# Patient Record
Sex: FEMALE | Race: WHITE | NOT HISPANIC OR LATINO | Employment: FULL TIME | URBAN - NONMETROPOLITAN AREA
[De-identification: names, ages, dates, MRNs, and addresses within clinical notes are randomized per-mention and may not be internally consistent; named-entity substitution may affect disease eponyms.]

---

## 2017-07-20 ENCOUNTER — OFFICE VISIT (OUTPATIENT)
Dept: ORTHOPEDIC SURGERY | Facility: CLINIC | Age: 47
End: 2017-07-20

## 2017-07-20 DIAGNOSIS — M25.361 PATELLAR INSTABILITY OF RIGHT KNEE: ICD-10-CM

## 2017-07-20 DIAGNOSIS — M25.561 RIGHT KNEE PAIN, UNSPECIFIED CHRONICITY: Primary | ICD-10-CM

## 2017-07-20 PROCEDURE — 99203 OFFICE O/P NEW LOW 30 MIN: CPT | Performed by: ORTHOPAEDIC SURGERY

## 2017-07-20 PROCEDURE — 73560 X-RAY EXAM OF KNEE 1 OR 2: CPT | Performed by: ORTHOPAEDIC SURGERY

## 2017-07-20 RX ORDER — CLONAZEPAM 1 MG/1
TABLET ORAL
Refills: 0 | COMMUNITY
Start: 2017-07-03

## 2017-07-20 RX ORDER — BUPRENORPHINE HYDROCHLORIDE 8 MG/1
TABLET SUBLINGUAL
Refills: 0 | COMMUNITY
Start: 2017-07-06

## 2017-07-20 RX ORDER — PRAZOSIN HYDROCHLORIDE 2 MG/1
CAPSULE ORAL
Refills: 4 | Status: ON HOLD | COMMUNITY
Start: 2017-07-03 | End: 2023-01-20

## 2017-07-20 RX ORDER — MONTELUKAST SODIUM 10 MG/1
TABLET ORAL
Refills: 1 | COMMUNITY
Start: 2017-06-01

## 2017-07-20 RX ORDER — LAMOTRIGINE 200 MG/1
TABLET ORAL
Refills: 4 | COMMUNITY
Start: 2017-07-10

## 2017-07-20 RX ORDER — DESVENLAFAXINE 25 MG/1
TABLET, EXTENDED RELEASE ORAL
Refills: 1 | Status: ON HOLD | COMMUNITY
Start: 2017-06-19 | End: 2023-01-20

## 2017-07-20 RX ORDER — ROPINIROLE 2 MG/1
TABLET, FILM COATED ORAL
Refills: 5 | COMMUNITY
Start: 2017-07-06

## 2017-07-20 RX ORDER — BUPROPION HYDROCHLORIDE 150 MG/1
TABLET ORAL
Refills: 4 | COMMUNITY
Start: 2017-05-01

## 2017-07-20 RX ORDER — PHENTERMINE HYDROCHLORIDE 30 MG/1
CAPSULE ORAL
Refills: 0 | Status: ON HOLD | COMMUNITY
Start: 2017-06-27 | End: 2023-01-20

## 2017-07-20 RX ORDER — CETIRIZINE HYDROCHLORIDE 10 MG/1
TABLET ORAL
Refills: 3 | COMMUNITY
Start: 2017-07-06

## 2017-07-20 RX ORDER — MOMETASONE FUROATE 50 UG/1
SPRAY, METERED NASAL
COMMUNITY
Start: 2017-07-19 | End: 2021-12-22

## 2017-07-20 RX ORDER — TIZANIDINE 4 MG/1
TABLET ORAL
Refills: 5 | COMMUNITY
Start: 2017-07-06

## 2017-07-20 RX ORDER — IPRATROPIUM BROMIDE AND ALBUTEROL SULFATE 2.5; .5 MG/3ML; MG/3ML
SOLUTION RESPIRATORY (INHALATION)
Refills: 6 | COMMUNITY
Start: 2017-04-20

## 2017-07-20 RX ORDER — ALBUTEROL SULFATE 90 UG/1
POWDER, METERED RESPIRATORY (INHALATION)
Refills: 5 | COMMUNITY
Start: 2017-07-06

## 2017-07-20 RX ORDER — IBUPROFEN 600 MG/1
TABLET ORAL
Refills: 1 | COMMUNITY
Start: 2017-06-15

## 2017-07-20 RX ORDER — OMEPRAZOLE 40 MG/1
CAPSULE, DELAYED RELEASE ORAL
Refills: 2 | COMMUNITY
Start: 2017-07-07 | End: 2022-12-19 | Stop reason: SDUPTHER

## 2017-07-20 RX ORDER — AMITRIPTYLINE HYDROCHLORIDE 100 MG/1
TABLET, FILM COATED ORAL
Refills: 3 | COMMUNITY
Start: 2017-07-10

## 2017-07-26 ENCOUNTER — TELEPHONE (OUTPATIENT)
Dept: ORTHOPEDIC SURGERY | Facility: CLINIC | Age: 47
End: 2017-07-26

## 2017-08-06 PROBLEM — M25.361 PATELLAR INSTABILITY OF RIGHT KNEE: Status: ACTIVE | Noted: 2017-08-06

## 2017-08-06 PROBLEM — M25.561 RIGHT KNEE PAIN: Status: ACTIVE | Noted: 2017-08-06

## 2018-04-12 ENCOUNTER — OFFICE VISIT CONVERTED (OUTPATIENT)
Dept: PULMONOLOGY | Facility: CLINIC | Age: 48
End: 2018-04-12
Attending: INTERNAL MEDICINE

## 2018-06-07 ENCOUNTER — CONVERSION ENCOUNTER (OUTPATIENT)
Dept: PLASTIC SURGERY | Facility: CLINIC | Age: 48
End: 2018-06-07

## 2018-06-07 ENCOUNTER — OFFICE VISIT CONVERTED (OUTPATIENT)
Dept: PLASTIC SURGERY | Facility: CLINIC | Age: 48
End: 2018-06-07
Attending: PLASTIC SURGERY

## 2018-06-26 ENCOUNTER — OFFICE VISIT CONVERTED (OUTPATIENT)
Dept: PULMONOLOGY | Facility: CLINIC | Age: 48
End: 2018-06-26
Attending: INTERNAL MEDICINE

## 2018-12-04 ENCOUNTER — OFFICE VISIT CONVERTED (OUTPATIENT)
Dept: PULMONOLOGY | Facility: CLINIC | Age: 48
End: 2018-12-04
Attending: PHYSICIAN ASSISTANT

## 2019-01-15 ENCOUNTER — OFFICE VISIT CONVERTED (OUTPATIENT)
Dept: PULMONOLOGY | Facility: CLINIC | Age: 49
End: 2019-01-15
Attending: INTERNAL MEDICINE

## 2019-01-25 ENCOUNTER — HOSPITAL ENCOUNTER (OUTPATIENT)
Dept: CARDIOLOGY | Facility: HOSPITAL | Age: 49
Discharge: HOME | End: 2019-01-25
Attending: INTERNAL MEDICINE

## 2019-01-25 LAB
BASOPHILS # BLD AUTO: 0.04 10*3/UL (ref 0–0.2)
BASOPHILS NFR BLD AUTO: 0.6 % (ref 0–3)
EOSINOPHIL # BLD AUTO: 0.63 10*3/UL (ref 0–0.7)
EOSINOPHIL # BLD AUTO: 9 % (ref 0–7)
ERYTHROCYTE [DISTWIDTH] IN BLOOD BY AUTOMATED COUNT: 12.7 % (ref 11.5–14.5)
HBA1C MFR BLD: 14 G/DL (ref 12–16)
HCT VFR BLD AUTO: 44.3 % (ref 37–47)
LYMPHOCYTES # BLD AUTO: 1.79 10*3/UL (ref 1–5)
MCH RBC QN AUTO: 28.6 PG (ref 27–31)
MCHC RBC AUTO-ENTMCNC: 31.6 G/DL (ref 33–37)
MCV RBC AUTO: 90.6 FL (ref 81–99)
MONOCYTES # BLD AUTO: 0.45 10*3/UL (ref 0.2–1.2)
MONOCYTES NFR BLD AUTO: 6.4 % (ref 3–10)
NEUTROPHILS # BLD AUTO: 4.03 10*3/UL (ref 2–8)
NEUTROPHILS NFR BLD AUTO: 57.8 % (ref 30–85)
PLATELET # BLD AUTO: 242 10*3/UL (ref 130–400)
PMV BLD AUTO: 10.7 FL (ref 7.4–10.4)
RBC # BLD AUTO: 4.89 10*6/UL (ref 4.2–5.4)
VARIANT LYMPHS NFR BLD MANUAL: 25.6 % (ref 20–45)
WBC # BLD AUTO: 6.98 10*3/UL (ref 4.8–10.8)

## 2019-01-26 LAB
CONV HEPATITIS C TEST INFO: NORMAL
HCV RNA SERPL NAA+PROBE-ACNC: NORMAL IU/ML

## 2019-01-27 LAB
CONV HBV TEST INFORMATION: NORMAL
CONV HEPATITIS B VIRUS DNA (LOG UNITS PER VOLUME) (VIRAL LOAD): NORMAL LOG10 IU/ML
HEP B VIRAL DNA IU/ML: NORMAL IU/ML

## 2019-01-28 LAB — CONV ANTI NEUTROPHILIC CYTOPLASMIC AB W/REFLEX: NORMAL

## 2019-01-29 LAB — IGE SERPL-ACNC: 19 K[IU]/ML (ref 0–24)

## 2019-03-04 ENCOUNTER — OFFICE VISIT CONVERTED (OUTPATIENT)
Dept: PULMONOLOGY | Facility: CLINIC | Age: 49
End: 2019-03-04
Attending: PHYSICIAN ASSISTANT

## 2019-05-07 ENCOUNTER — OFFICE VISIT CONVERTED (OUTPATIENT)
Dept: PULMONOLOGY | Facility: CLINIC | Age: 49
End: 2019-05-07
Attending: PHYSICIAN ASSISTANT

## 2019-05-07 ENCOUNTER — HOSPITAL ENCOUNTER (OUTPATIENT)
Dept: LAB | Facility: HOSPITAL | Age: 49
Discharge: HOME | End: 2019-05-07
Attending: PHYSICIAN ASSISTANT

## 2019-05-07 LAB
BASOPHILS # BLD AUTO: 0.08 10*3/UL (ref 0–0.2)
BASOPHILS NFR BLD AUTO: 1.3 % (ref 0–3)
CONV ABS IMM GRAN: 0.03 10*3/UL (ref 0–0.2)
CONV IMMATURE GRAN: 0.5 % (ref 0–1.8)
DEPRECATED RDW RBC AUTO: 44.9 FL (ref 36.4–46.3)
EOSINOPHIL # BLD AUTO: 0.9 10*3/UL (ref 0–0.7)
EOSINOPHIL # BLD AUTO: 15.1 % (ref 0–7)
ERYTHROCYTE [DISTWIDTH] IN BLOOD BY AUTOMATED COUNT: 13.2 % (ref 11.7–14.4)
HBA1C MFR BLD: 12.5 G/DL (ref 12–16)
HCT VFR BLD AUTO: 40.3 % (ref 37–47)
LYMPHOCYTES # BLD AUTO: 1.17 10*3/UL (ref 1–5)
MCH RBC QN AUTO: 28.5 PG (ref 27–31)
MCHC RBC AUTO-ENTMCNC: 31 G/DL (ref 33–37)
MCV RBC AUTO: 92 FL (ref 81–99)
MONOCYTES # BLD AUTO: 0.44 10*3/UL (ref 0.2–1.2)
MONOCYTES NFR BLD AUTO: 7.4 % (ref 3–10)
NEUTROPHILS # BLD AUTO: 3.33 10*3/UL (ref 2–8)
NEUTROPHILS NFR BLD AUTO: 56 % (ref 30–85)
NRBC CBCN: 0 % (ref 0–0.7)
PLATELET # BLD AUTO: 244 10*3/UL (ref 130–400)
PMV BLD AUTO: 11.6 FL (ref 9.4–12.3)
RBC # BLD AUTO: 4.38 10*6/UL (ref 4.2–5.4)
VARIANT LYMPHS NFR BLD MANUAL: 19.7 % (ref 20–45)
WBC # BLD AUTO: 5.95 10*3/UL (ref 4.8–10.8)

## 2019-05-08 LAB
ALBUMIN SERPL-MCNC: 4.1 G/DL (ref 3.5–5)
ALBUMIN/GLOB SERPL: 1.8 {RATIO} (ref 1.4–2.6)
ALP SERPL-CCNC: 73 U/L (ref 42–98)
ALT SERPL-CCNC: 18 U/L (ref 10–40)
ANION GAP SERPL CALC-SCNC: 12 MMOL/L (ref 8–19)
AST SERPL-CCNC: 22 U/L (ref 15–50)
BILIRUB SERPL-MCNC: 0.19 MG/DL (ref 0.2–1.3)
BUN SERPL-MCNC: 10 MG/DL (ref 5–25)
BUN/CREAT SERPL: 11 {RATIO} (ref 6–20)
CALCIUM SERPL-MCNC: 9.4 MG/DL (ref 8.7–10.4)
CHLORIDE SERPL-SCNC: 101 MMOL/L (ref 99–111)
CONV CO2: 31 MMOL/L (ref 22–32)
CONV TOTAL PROTEIN: 6.4 G/DL (ref 6.3–8.2)
CREAT UR-MCNC: 0.93 MG/DL (ref 0.5–0.9)
GFR SERPLBLD BASED ON 1.73 SQ M-ARVRAT: >60 ML/MIN/{1.73_M2}
GLOBULIN UR ELPH-MCNC: 2.3 G/DL (ref 2–3.5)
GLUCOSE SERPL-MCNC: 89 MG/DL (ref 65–99)
OSMOLALITY SERPL CALC.SUM OF ELEC: 289 MOSM/KG (ref 273–304)
POTASSIUM SERPL-SCNC: 4.1 MMOL/L (ref 3.5–5.3)
SODIUM SERPL-SCNC: 140 MMOL/L (ref 135–147)

## 2019-06-19 ENCOUNTER — HOSPITAL ENCOUNTER (OUTPATIENT)
Dept: LAB | Facility: HOSPITAL | Age: 49
Discharge: HOME | End: 2019-06-19
Attending: INTERNAL MEDICINE

## 2019-06-19 LAB
ALBUMIN SERPL-MCNC: 4.3 G/DL (ref 3.5–5)
ALBUMIN/GLOB SERPL: 1.7 {RATIO} (ref 1.4–2.6)
ALP SERPL-CCNC: 76 U/L (ref 42–98)
ALT SERPL-CCNC: 15 U/L (ref 10–40)
ANION GAP SERPL CALC-SCNC: 16 MMOL/L (ref 8–19)
AST SERPL-CCNC: 20 U/L (ref 15–50)
BASOPHILS # BLD AUTO: 0.06 10*3/UL (ref 0–0.2)
BASOPHILS NFR BLD AUTO: 1.1 % (ref 0–3)
BILIRUB SERPL-MCNC: 0.37 MG/DL (ref 0.2–1.3)
BUN SERPL-MCNC: 10 MG/DL (ref 5–25)
BUN/CREAT SERPL: 9 {RATIO} (ref 6–20)
CALCIUM SERPL-MCNC: 9.7 MG/DL (ref 8.7–10.4)
CHLORIDE SERPL-SCNC: 101 MMOL/L (ref 99–111)
CONV ABS IMM GRAN: 0.01 10*3/UL (ref 0–0.2)
CONV CO2: 30 MMOL/L (ref 22–32)
CONV IMMATURE GRAN: 0.2 % (ref 0–1.8)
CONV TOTAL PROTEIN: 6.9 G/DL (ref 6.3–8.2)
CREAT UR-MCNC: 1.08 MG/DL (ref 0.5–0.9)
DEPRECATED RDW RBC AUTO: 45.1 FL (ref 36.4–46.3)
EOSINOPHIL # BLD AUTO: 0.51 10*3/UL (ref 0–0.7)
EOSINOPHIL # BLD AUTO: 9.1 % (ref 0–7)
ERYTHROCYTE [DISTWIDTH] IN BLOOD BY AUTOMATED COUNT: 13 % (ref 11.7–14.4)
GFR SERPLBLD BASED ON 1.73 SQ M-ARVRAT: 60 ML/MIN/{1.73_M2}
GLOBULIN UR ELPH-MCNC: 2.6 G/DL (ref 2–3.5)
GLUCOSE SERPL-MCNC: 86 MG/DL (ref 65–99)
HBA1C MFR BLD: 12.9 G/DL (ref 12–16)
HCT VFR BLD AUTO: 42.1 % (ref 37–47)
LYMPHOCYTES # BLD AUTO: 1.07 10*3/UL (ref 1–5)
MCH RBC QN AUTO: 29.1 PG (ref 27–31)
MCHC RBC AUTO-ENTMCNC: 30.6 G/DL (ref 33–37)
MCV RBC AUTO: 95 FL (ref 81–99)
MONOCYTES # BLD AUTO: 0.5 10*3/UL (ref 0.2–1.2)
MONOCYTES NFR BLD AUTO: 8.9 % (ref 3–10)
NEUTROPHILS # BLD AUTO: 3.47 10*3/UL (ref 2–8)
NEUTROPHILS NFR BLD AUTO: 61.7 % (ref 30–85)
NRBC CBCN: 0 % (ref 0–0.7)
OSMOLALITY SERPL CALC.SUM OF ELEC: 294 MOSM/KG (ref 273–304)
PLATELET # BLD AUTO: 257 10*3/UL (ref 130–400)
PMV BLD AUTO: 11.2 FL (ref 9.4–12.3)
POTASSIUM SERPL-SCNC: 4.4 MMOL/L (ref 3.5–5.3)
RBC # BLD AUTO: 4.43 10*6/UL (ref 4.2–5.4)
SODIUM SERPL-SCNC: 143 MMOL/L (ref 135–147)
VARIANT LYMPHS NFR BLD MANUAL: 19 % (ref 20–45)
WBC # BLD AUTO: 5.62 10*3/UL (ref 4.8–10.8)

## 2019-07-01 ENCOUNTER — OFFICE VISIT CONVERTED (OUTPATIENT)
Dept: PULMONOLOGY | Facility: CLINIC | Age: 49
End: 2019-07-01
Attending: INTERNAL MEDICINE

## 2019-07-05 ENCOUNTER — OFFICE VISIT CONVERTED (OUTPATIENT)
Dept: SURGERY | Facility: CLINIC | Age: 49
End: 2019-07-05
Attending: SURGERY

## 2019-07-17 ENCOUNTER — HOSPITAL ENCOUNTER (OUTPATIENT)
Dept: GASTROENTEROLOGY | Facility: HOSPITAL | Age: 49
Setting detail: HOSPITAL OUTPATIENT SURGERY
Discharge: HOME | End: 2019-07-17
Attending: SURGERY

## 2019-10-28 ENCOUNTER — OFFICE VISIT CONVERTED (OUTPATIENT)
Dept: PULMONOLOGY | Facility: CLINIC | Age: 49
End: 2019-10-28
Attending: PHYSICIAN ASSISTANT

## 2020-01-15 ENCOUNTER — HOSPITAL ENCOUNTER (OUTPATIENT)
Facility: HOSPITAL | Age: 50
Setting detail: HOSPITAL OUTPATIENT SURGERY
End: 2020-01-15
Attending: OPHTHALMOLOGY | Admitting: OPHTHALMOLOGY

## 2020-03-03 ENCOUNTER — OFFICE VISIT CONVERTED (OUTPATIENT)
Dept: PULMONOLOGY | Facility: CLINIC | Age: 50
End: 2020-03-03
Attending: INTERNAL MEDICINE

## 2020-03-16 ENCOUNTER — HOSPITAL ENCOUNTER (OUTPATIENT)
Dept: SURGERY | Facility: HOSPITAL | Age: 50
Setting detail: HOSPITAL OUTPATIENT SURGERY
Discharge: HOME | End: 2020-03-16
Attending: OPHTHALMOLOGY

## 2020-03-17 LAB
CONV HEPATITIS B SURFACE AG W CONFIRMATION RE: NEGATIVE
CONV HIV-1/ HIV-2: NEGATIVE
HCV AB SER DONR QL: <0.1 S/CO RATIO (ref 0–0.9)

## 2020-04-28 ENCOUNTER — OFFICE VISIT CONVERTED (OUTPATIENT)
Dept: PULMONOLOGY | Facility: CLINIC | Age: 50
End: 2020-04-28
Attending: INTERNAL MEDICINE

## 2020-08-05 ENCOUNTER — OFFICE VISIT CONVERTED (OUTPATIENT)
Dept: PULMONOLOGY | Facility: CLINIC | Age: 50
End: 2020-08-05
Attending: NURSE PRACTITIONER

## 2020-12-02 ENCOUNTER — OFFICE VISIT CONVERTED (OUTPATIENT)
Dept: PULMONOLOGY | Facility: CLINIC | Age: 50
End: 2020-12-02
Attending: INTERNAL MEDICINE

## 2021-03-01 ENCOUNTER — OFFICE VISIT CONVERTED (OUTPATIENT)
Dept: SURGERY | Facility: CLINIC | Age: 51
End: 2021-03-01
Attending: NURSE PRACTITIONER

## 2021-03-10 ENCOUNTER — HOSPITAL ENCOUNTER (OUTPATIENT)
Dept: GASTROENTEROLOGY | Facility: HOSPITAL | Age: 51
Setting detail: HOSPITAL OUTPATIENT SURGERY
Discharge: HOME | End: 2021-03-10
Attending: SURGERY

## 2021-03-10 LAB
C DIFF TOX B STL QL CT TISS CULT: POSITIVE
CONV 027 TOXIN: NEGATIVE

## 2021-03-12 LAB — BACTERIA SPEC AEROBE CULT: NORMAL

## 2021-03-24 ENCOUNTER — HOSPITAL ENCOUNTER (OUTPATIENT)
Dept: SURGERY | Facility: CLINIC | Age: 51
Discharge: HOME | End: 2021-03-24
Attending: NURSE PRACTITIONER

## 2021-03-24 ENCOUNTER — HOSPITAL ENCOUNTER (OUTPATIENT)
Dept: LAB | Facility: HOSPITAL | Age: 51
Discharge: HOME | End: 2021-03-24
Attending: NURSE PRACTITIONER

## 2021-03-24 ENCOUNTER — OFFICE VISIT CONVERTED (OUTPATIENT)
Dept: SURGERY | Facility: CLINIC | Age: 51
End: 2021-03-24
Attending: NURSE PRACTITIONER

## 2021-03-24 ENCOUNTER — CONVERSION ENCOUNTER (OUTPATIENT)
Dept: SURGERY | Facility: CLINIC | Age: 51
End: 2021-03-24

## 2021-03-24 LAB
BILIRUB UR QL STRIP: NEGATIVE
C DIFF TOX B STL QL CT TISS CULT: NEGATIVE
COLOR UR: NORMAL
CONV 027 TOXIN: NEGATIVE
CONV CLARITY OF URINE: CLEAR
CONV PROTEIN IN URINE BY AUTOMATED TEST STRIP: NEGATIVE
CONV UROBILINOGEN IN URINE BY AUTOMATED TEST STRIP: NORMAL
GLUCOSE UR QL: NEGATIVE
HGB UR QL STRIP: NEGATIVE
KETONES UR QL STRIP: NEGATIVE
LEUKOCYTE ESTERASE UR QL STRIP: NORMAL
NITRITE UR QL STRIP: POSITIVE
PH UR STRIP.AUTO: 5 [PH]
SP GR UR: 1.01

## 2021-03-26 LAB
AMPICILLIN SUSC ISLT: 16
AMPICILLIN+SULBAC SUSC ISLT: <=2
BACTERIA UR CULT: ABNORMAL
CEFAZOLIN SUSC ISLT: <=4
CEFEPIME SUSC ISLT: <=0.12
CEFTAZIDIME SUSC ISLT: <=1
CEFTRIAXONE SUSC ISLT: <=0.25
CIPROFLOXACIN SUSC ISLT: <=0.25
ERTAPENEM SUSC ISLT: <=0.12
GENTAMICIN SUSC ISLT: <=1
LEVOFLOXACIN SUSC ISLT: <=0.12
NITROFURANTOIN SUSC ISLT: 64
PIP+TAZO SUSC ISLT: <=4
TMP SMX SUSC ISLT: <=20
TOBRAMYCIN SUSC ISLT: <=1

## 2021-05-10 NOTE — H&P
History and Physical      Patient Name: Lamar Vee   Patient ID: [id number]   Sex: Female   YOB: 1970    Primary Care Provider: Everett Tapia MD   Referring Provider: Martha Russo NP    Visit Date: March 1, 2021    Provider: OSMAN Cueto   Location: [facility]   Location Address: [address] [place] [address]  [id number]   Location Phone: [phone]          Chief Complaint  · Requesting colonoscopy  · Age 50 or over  · Diarrhea      History Of Present Illness  The patient is a 50 year old /White female presenting to the Surgical Specialist office on a referral from Martha Russo NP.   Lamar Vee needs to have a diagnostic colonoscopy.   Patient states that they have had a colonoscopy. 6 years ago   Patient currently complains of: diarrhea   Patient Does have family history of colon cancer. Mother      Patient presents today on referral from Martha Shane for diarrhea.  Patient reports that she has been with diarrhea just about every day for the last 3 months.  She reports that she was taking Imodium which is not helping her symptoms anymore.  Patient denies any abdominal pain or rectal bleeding.  Reports that she has had a laparoscopic cholecystectomy 12 to 13 years ago.  Admits to family history of colon cancer with her mother.    5/15: colonoscopy ([facility]): Normal colon.          Past Medical History  Disease Name Date Onset Notes   Allergies --  --    Anxiety --  --    Asthma --  --    Depression --  --    Head injury --  --    Hypothyroidism --  --    Macromastia --  --    Melanoma --  --    Migraine --  --    Sarcoidosis --  --          Past Surgical History  Procedure Name Date Notes   Appendectomy --  --    Back --  --    Back surgery --  --    Cholecstectomy --  --    Facial reconstruction --  --    Gallbladder --  --    Hysterectomy --  --    Hysterectomy-Abdominal --  --    Melanoma excision --  --    Rotator Cuff repair --  --   "        Medication List  Name Date Started Instructions   Imitrex oral  --    Klonopin oral  --    Miralax 17 gram/dose oral powder 03/01/2021 Take as directed. Disp: 238 gm bottle   pantoprazole 40 mg oral tablet,delayed release (/EC) 01/06/2020 take 1 tablet (40 mg) by oral route once daily for 30 days   Pristiq oral  --    propranolol oral  --    Requip oral  --    Suboxone sublingual  --          Allergy List  Allergen Name Date Reaction Notes   TETANUS --  --  --    Toradol --  --  --    Tylox --  --  --        Allergies Reconciled  Family Medical History  Disease Name Relative/Age Notes   Family history of colon cancer Mother/50s   Mother/50s         Social History  Finding Status Start/Stop Quantity Notes   Alcohol Never --/-- --  drinks no   Caffeine Current - status unknown --/-- --  drinks 5 or more times per day   Second hand smoke exposure Current some day --/-- --  yes   Tobacco Never --/-- --  never a smoker  06/23/2017 -          Review of Systems  · Constitutional  o Denies  o : fever, chills  · Eyes  o Denies  o : yellowish discoloration of eyes  · HENT  o Denies  o : difficulty swallowing  · Cardiovascular  o Denies  o : chest pain, chest pain on exertion  · Respiratory  o Denies  o : shortness of breath  · Gastrointestinal  o Admits  o : diarrhea  o Denies  o : nausea, vomiting, constipation  · Genitourinary  o Denies  o : abnormal color of urine  · Integument  o Denies  o : rash  · Neurologic  o Denies  o : tingling or numbness  · Musculoskeletal  o Denies  o : joint pain  · Endocrine  o Denies  o : weight gain, weight loss      Vitals  Date Time BP Position Site L\R Cuff Size HR RR TEMP (F) WT  HT  BMI kg/m2 BSA m2 O2 Sat FR L/min FiO2 HC       03/01/2021 03:34 PM       16  152lbs 8oz 5'  1\" 28.81 1.73             Physical Examination  · Constitutional  o Appearance  o : well developed, well-nourished, patient in no apparent distress  · Head and Face  o Head  o :   § Inspection  § : " atraumatic, normocephalic  o Face  o :   § Inspection  § : no facial lesions  · Eyes  o Conjunctivae  o : conjunctivae normal  o Sclerae  o : sclerae white  · Neck  o Inspection/Palpation  o : normal appearance, no masses or tenderness, trachea midline  · Respiratory  o Respiratory Effort  o : breathing unlabored  · Skin and Subcutaneous Tissue  o General Inspection  o : no lesions present, no areas of discoloration, skin turgor normal, texture normal  · Neurologic  o Mental Status Examination  o :   § Orientation  § : grossly oriented to person, place and time  § Attention  § : attention normal, concentration abilities normal  § Fund of Knowledge  § : fund of knowledge within normal limits, patient aware of current events  o Gait and Station  o : normal gait, able to stand without difficulty  · Psychiatric  o Judgement and Insight  o : judgment and insight intact  o Mood and Affect  o : mood normal, affect appropriate              Assessment  · Diarrhea     787.91/R19.7    Problems Reconciled  Plan  · Orders  o Consent for Colonoscopy with Possible Biopsy - Possible risks/complications, benefits, and alternatives to surgical or invasive procedure have been explained to patient and/or legal guardian. -Patient has been evaluated and can tolerate anesthesia and/or sedation. Risks, benefits, and alternatives to anesthesia and sedation have been explained to patient and/or legal guardian. ([id number]) - 787.91/R19.7 - 03/10/2021  · Medications  o Medications have been Reconciled  o Transition of Care or Provider Policy  · Instructions  o Surgical Facility: [facility]  o Handouts Provided Pre-Procedure Instructions including date, time, and location of procedure.   o PLAN: Proceeed with colonoscopy. Patient understands risks/benefits and is willing to proceed.   o ***Surgical Orders***  o RISK AND BENEFITS:  o Given these options, the patient has verbally expressed an understanding of the risks of the surgery  and finds these risks acceptable. Will proceed with surgery as soon as possible.  o O.R. PREP: Per protocol   o IV: Per Anesthesia  o Please sign permit for: Colonoscopy with possible biopsies by Dr. Guillermo.  o The above History and Physical Examination has been completed within 30 days of admission.  o ***Patient Status***  o Outpatient  o Follow up in the in the office post procedure.  o Electronically Identified Patient Education Materials Provided Electronically            Electronically Signed by: OSMAN Cueto -Author on March 1, 2021 03:51:25 PM

## 2021-05-14 VITALS — WEIGHT: 155 LBS | HEIGHT: 61 IN | BODY MASS INDEX: 29.27 KG/M2 | RESPIRATION RATE: 12 BRPM

## 2021-05-14 VITALS — RESPIRATION RATE: 16 BRPM | BODY MASS INDEX: 28.79 KG/M2 | WEIGHT: 152.5 LBS | HEIGHT: 61 IN

## 2021-05-14 NOTE — PROGRESS NOTES
Progress Note      Patient Name: Lamar Vee   Patient ID: [id number]   Sex: Female   YOB: 1970    Primary Care Provider: Everett Tapia MD   Referring Provider: Martha Russo NP    Visit Date: March 24, 2021    Provider: OSMAN Cueto   Location: [facility]   Location Address: [address] [place] [address]  [id number]   Location Phone: [phone]          Chief Complaint  · Follow Up Colonoscopy      History Of Present Illness  Lamar Vee is a 50 year old /White female who is here to follow up colonoscopy.      Presents today for a follow-up visit after undergoing a colonoscopy on 3/10/2021 performed by Dr. Seb Guillermo.  Patient was with C. difficile colitis per pathology.  Patient was treated with 10-day supply of vancomycin.  It was also noted that she has an ulcer in her rectum per colonoscopy/pathology.  Patient reports that she is still with diarrhea.  She is also complaining of some urinary symptoms of feeling like she has a UTI.  She currently is admitted urinary frequency urgency and dysuria.  Admits to suprapubic pain.  Denies any postoperative complications.       Past Medical History  Disease Name Date Onset Notes   Allergies --  --    Anxiety --  --    Asthma --  --    Depression --  --    Head injury --  --    Hypothyroidism --  --    Macromastia --  --    Melanoma --  --    Migraine --  --    Sarcoidosis --  --          Past Surgical History  Procedure Name Date Notes   Appendectomy --  --    Back --  --    Back surgery --  --    Cholecstectomy --  --    Colonoscopy --  --    Facial reconstruction --  --    Gallbladder --  --    Hysterectomy --  --    Hysterectomy-Abdominal --  --    Melanoma excision --  --    Rotator Cuff repair --  --          Medication List  Name Date Started Instructions   Fasenra 30 mg/mL subcutaneous syringe  inject 1 milliliter (30 mg) by subcutaneous route every 8 weeks in the abdomen, thigh, or upper arm  "  Florajen3 460 mg (7.5-6- 1.5 bill. cell) oral capsule 03/24/2021 take 1 capsule by oral route once a day (at bedtime) for 30 days   Imitrex oral  --    Klonopin oral  --    pantoprazole 40 mg oral tablet,delayed release (/EC) 01/06/2020 take 1 tablet (40 mg) by oral route once daily for 30 days   Pristiq oral  --    propranolol oral  --    Requip oral  --    Suboxone sublingual  --          Allergy List  Allergen Name Date Reaction Notes   TETANUS --  --  --    Toradol --  --  --    Tylox --  --  --          Family Medical History  Disease Name Relative/Age Notes   Family history of colon cancer Mother/50s   Mother/50s         Social History  Finding Status Start/Stop Quantity Notes   Alcohol Never --/-- --  drinks no   Caffeine Current - status unknown --/-- --  drinks 5 or more times per day   Second hand smoke exposure Current some day --/-- --  yes   Tobacco Never --/-- --  never a smoker  06/23/2017 -          Review of Systems  · Constitutional  o Denies  o : chills, fever  · Eyes  o Denies  o : yellowish discoloration of eyes  · HENT  o Denies  o : difficulty swallowing  · Cardiovascular  o Denies  o : chest pain on exertion  · Respiratory  o Denies  o : shortness of breath  · Gastrointestinal  o Admits  o : diarrhea  o Denies  o : nausea, vomiting, constipation  · Genitourinary  o Admits  o : urgency, frequency, dysuria  o Denies  o : abnormal color of urine  · Integument  o Denies  o : rash  · Neurologic  o Denies  o : tingling or numbness  · Musculoskeletal  o Denies  o : joint pain  · Endocrine  o Denies  o : weight gain, weight loss      Vitals  Date Time BP Position Site L\R Cuff Size HR RR TEMP (F) WT  HT  BMI kg/m2 BSA m2 O2 Sat FR L/min FiO2 HC       03/24/2021 11:02 AM       12  155lbs 0oz 5'  1\" 29.29 1.74             Physical Examination  · Constitutional  o Appearance  o : well developed, well-nourished, patient in no apparent distress  · Head and Face  o Head  o :   § Inspection  § : " atraumatic, normocephalic  o Face  o :   § Inspection  § : no facial lesions  · Eyes  o Conjunctivae  o : conjunctivae normal  o Sclerae  o : sclerae white  · Neck  o Inspection/Palpation  o : normal appearance, no masses or tenderness, trachea midline  · Respiratory  o Respiratory Effort  o : breathing unlabored  · Skin and Subcutaneous Tissue  o General Inspection  o : no lesions present, no areas of discoloration, skin turgor normal, texture normal  · Neurologic  o Mental Status Examination  o :   § Orientation  § : grossly oriented to person, place and time  § Attention  § : attention normal, concentration abilities normal  § Fund of Knowledge  § : fund of knowledge within normal limits, patient aware of current events  o Gait and Station  o : normal gait, able to stand without difficulty  · Psychiatric  o Judgement and Insight  o : judgment and insight intact  o Mood and Affect  o : mood normal, affect appropriate          Results  · In-Office Procedures  o Lab procedure  § Automated Dipstick Urinalysis (Surg Spec) WITHOUT Micro H ([id number])   § Color Ur: Dark yellow   § Clarity Ur: Clear   § Glucose Ur Ql Strip: Negative   § Bilirub Ur Ql Strip: Negative   § Ketones Ur Ql Strip: Negative   § Sp Gr Ur Qn: 1.010   § Hgb Ur Ql Strip: Negative   § pH Ur-LsCnc: 5.0   § Prot Ur Ql Strip: Negative   § Urobilinogen Ur Strip-mCnc: 0.2 E.U./dL   § Nitrite Ur Ql Strip: Positive   § WBC Est Ur Ql Strip: Small       Assessment  · S/P colonoscopy     V45.89/Z98.890  · Clostridioides difficile diarrhea     008.45/A04.72  · UTI (urinary tract infection)     599.0/N39.0    Problems Reconciled  Plan  · Orders  o Urine Culture (Clean Catch) [facility] ([id number]) - V45.89/Z98.890, 008.45/A04.72, 599.0/N39.0 - 03/24/2021  o Clostridium Difficile Toxigenic Assay (PCR) [facility] ([id number]) - V45.89/Z98.890, 008.45/A04.72, 599.0/N39.0 - 03/24/2021  · Medications  o Medications have been Reconciled  o Transition of Care or Provider  Policy  · Instructions  o Per the AGA guidelines of 2012 patient is to follow up for colonoscopy surveillance  o Screening: In 5 years. Follow-up in the interim as needed.  o I will start the patient on Florajen3  o Stool study for C. difficile to confirm eradication.  o Electronically Identified Patient Education Materials Provided Electronically  · Disposition  o Call or Return if symptoms worsen or persist.            Electronically Signed by: OSMAN Cueto -Author on March 24, 2021 11:36:41 AM

## 2021-05-15 VITALS — BODY MASS INDEX: 31.91 KG/M2 | HEIGHT: 61 IN | RESPIRATION RATE: 14 BRPM | WEIGHT: 169 LBS

## 2021-05-16 VITALS
WEIGHT: 168 LBS | SYSTOLIC BLOOD PRESSURE: 93 MMHG | HEART RATE: 83 BPM | HEIGHT: 61 IN | DIASTOLIC BLOOD PRESSURE: 54 MMHG | BODY MASS INDEX: 31.72 KG/M2 | OXYGEN SATURATION: 95 %

## 2021-05-28 VITALS
SYSTOLIC BLOOD PRESSURE: 106 MMHG | HEART RATE: 86 BPM | TEMPERATURE: 98.1 F | RESPIRATION RATE: 14 BRPM | SYSTOLIC BLOOD PRESSURE: 111 MMHG | BODY MASS INDEX: 31.34 KG/M2 | TEMPERATURE: 98 F | WEIGHT: 165.56 LBS | WEIGHT: 167.37 LBS | TEMPERATURE: 98.8 F | BODY MASS INDEX: 31.26 KG/M2 | RESPIRATION RATE: 16 BRPM | TEMPERATURE: 97.8 F | HEART RATE: 63 BPM | TEMPERATURE: 97.7 F | HEIGHT: 61 IN | RESPIRATION RATE: 14 BRPM | WEIGHT: 166 LBS | HEIGHT: 61 IN | HEIGHT: 61 IN | BODY MASS INDEX: 30.63 KG/M2 | DIASTOLIC BLOOD PRESSURE: 58 MMHG | OXYGEN SATURATION: 97 % | BODY MASS INDEX: 31.6 KG/M2 | RESPIRATION RATE: 14 BRPM | WEIGHT: 164.25 LBS | HEART RATE: 99 BPM | OXYGEN SATURATION: 98 % | HEIGHT: 61 IN | DIASTOLIC BLOOD PRESSURE: 70 MMHG | HEART RATE: 86 BPM | HEIGHT: 61 IN | DIASTOLIC BLOOD PRESSURE: 63 MMHG | HEART RATE: 79 BPM | OXYGEN SATURATION: 94 % | OXYGEN SATURATION: 98 % | OXYGEN SATURATION: 96 % | WEIGHT: 162.25 LBS | DIASTOLIC BLOOD PRESSURE: 60 MMHG | SYSTOLIC BLOOD PRESSURE: 114 MMHG | RESPIRATION RATE: 17 BRPM | SYSTOLIC BLOOD PRESSURE: 102 MMHG | DIASTOLIC BLOOD PRESSURE: 74 MMHG | BODY MASS INDEX: 31.01 KG/M2 | SYSTOLIC BLOOD PRESSURE: 116 MMHG

## 2021-05-28 VITALS
BODY MASS INDEX: 31.06 KG/M2 | HEIGHT: 61 IN | OXYGEN SATURATION: 99 % | TEMPERATURE: 97.7 F | HEIGHT: 61 IN | OXYGEN SATURATION: 100 % | WEIGHT: 161.19 LBS | HEIGHT: 61 IN | HEART RATE: 80 BPM | TEMPERATURE: 97.5 F | OXYGEN SATURATION: 100 % | RESPIRATION RATE: 14 BRPM | BODY MASS INDEX: 30.63 KG/M2 | RESPIRATION RATE: 12 BRPM | BODY MASS INDEX: 30.43 KG/M2 | TEMPERATURE: 97.9 F | BODY MASS INDEX: 31.26 KG/M2 | DIASTOLIC BLOOD PRESSURE: 60 MMHG | SYSTOLIC BLOOD PRESSURE: 102 MMHG | WEIGHT: 162.25 LBS | SYSTOLIC BLOOD PRESSURE: 109 MMHG | SYSTOLIC BLOOD PRESSURE: 133 MMHG | HEART RATE: 100 BPM | DIASTOLIC BLOOD PRESSURE: 65 MMHG | RESPIRATION RATE: 14 BRPM | HEIGHT: 61 IN | DIASTOLIC BLOOD PRESSURE: 62 MMHG | HEART RATE: 67 BPM | SYSTOLIC BLOOD PRESSURE: 109 MMHG | DIASTOLIC BLOOD PRESSURE: 50 MMHG | WEIGHT: 165.56 LBS | HEART RATE: 82 BPM | WEIGHT: 164.5 LBS | OXYGEN SATURATION: 97 % | RESPIRATION RATE: 15 BRPM | TEMPERATURE: 97.6 F

## 2021-05-28 VITALS
HEART RATE: 84 BPM | SYSTOLIC BLOOD PRESSURE: 117 MMHG | WEIGHT: 160 LBS | RESPIRATION RATE: 15 BRPM | OXYGEN SATURATION: 97 % | TEMPERATURE: 98 F | DIASTOLIC BLOOD PRESSURE: 60 MMHG | HEIGHT: 61 IN | BODY MASS INDEX: 30.21 KG/M2

## 2021-05-28 NOTE — PROGRESS NOTES
Patient: YAMILA FREEMAN     Acct: [id number]     Report: #[id number]  UNIT #: [id number]     : 1970    Encounter Date:2020  PRIMARY CARE: ARASH MARROQUIN  ***Signed***  --------------------------------------------------------------------------------------------------------------------  TELEHEALTH NOTE      History of Present Illness            Chief Complaint: 6 week follow up/Lab results            Yamila Freeman is presenting for evaluation via Telehealth visit. Verbal     consent obtained before beginning visit.            Provider spent12 minutes with the patient during telehealth visit.            The following staff were present during the visit: Bernice Márquez MA, Victor M Field DO            The patient is a 49 year old very pleasant female with a history of sarcoidosis     and asthma with eosinophilia.  She is here today for follow up.  The patient was    started on fasenra injections since last office visit. She received an injection    at the end of March and feels it has significantly helped with her breathing.     She does not have any further cough, denies having any shortness of breath at     this time, states she is doing well.  She states prior to having this she was     chronically on steroids and always sick.  She denies any fever or chills. She     uses her rescue inhaler very infrequently at this time.  Reports compliance with    the use of all other bronchodilator therapies.            Physical exam deferred due to TeleHealth visit.                           Past Med History      Past Med History: Sarcoidosis, Asthma            Never Smoker            Flu and Pneumonia Vaccine: Current      Overview of Symptoms      Pt complains of: Pt states no complaints            Pt denies: SOA, Wheezing, Cough, Fever, Chills, Nausea, Vomiting            Allergies/Medications      Allergies:        Coded Allergies:             TETANUS TOXOID, ADSORBED (Verified  Allergy, Unknown, HIGH  FEVER, TALKING     OUT OF HEAD, 3/3/20)           BUPRENORPHINE (Verified  Adverse Reaction, Unknown, PT TAKES BUPRENORPHINE     REGULARLY, 3/3/20)                  TAKES THE FORM WITHOUT THE MELOXONE           MORPHINE (Verified  Adverse Reaction, Unknown, MIGRAINE, RASH, 3/3/20)           NALOXONE (Verified  Adverse Reaction, Unknown, RASH, HEADACHE, 3/3/20)           PENTAZOCINE (Verified  Adverse Reaction, Unknown, UNK, 3/3/20)      Medications    Last Reconciled on 4/28/20 09:08 by ANGELICA FIELD MD      Benralizumab Pen (Fasenra Pen) 30 Mg/1 Ml Auto.injct      30 MG SUBQ [id number], #1 AUTO.INJCT 11 Refills         Prov: Angelica Field         3/25/20       Benralizumab Pen (Fasenra Pen) 30 Mg/1 Ml Auto.injct      30 MG SUBQ [id number], #1 AUTO.INJCT 2 Refills         Prov: Angelica Field         3/25/20       Levothyroxine (Levothyroxine) 0.05 Mg Tablet      0.05 MG PO QDAY@07, #30 TAB 0 Refills         Reported         3/16/20       Pantoprazole (Protonix) 20 Mg Tablet.dr      40 MG PO QDAY@07, #60 TAB 0 Refills         Reported         3/16/20       Albuterol (Proair HFA) 8.5 Gm Inh      2 PUFFS INH RTQ4H, #1 INH 11 Refills         Prov: Angelica Field         3/3/20       clonazePAM (clonazePAM) 1 Mg Tablet      1 MG PO BID, #60 TAB 0 Refills         Reported         10/28/19       Montelukast Sodium (Singulair*) 10 Mg Tab      10 MG PO QDAY for 30 Days, #30 TAB 3 Refills         Prov: Angelica Field         10/2/19       Linaclotide (Linzess) 145 Mcg Capsule      145 MCG PO QDAY, CAP         Reported         7/15/19       Acyclovir (Acyclovir) 800 Mg Tablet      800 MG PO QDAY, #50 TAB         Reported         7/15/19       Folic Acid (Folic Acid) 1 Mg Tablet      1 MG PO QDAY, #30 TAB 0 Refills         Reported         7/15/19       Prazosin HCl (Prazosin HCl) 2 Mg Capsule      2 MG PO HS, #90 CAP 0 Refills         Reported         7/15/19       Amitriptyline HCl (Amitriptyline HCl) 100 Mg Tablet       100 MG PO HS, #30 TAB         Reported         7/15/19       lamoTRIgine (lamoTRIgine) 200 Mg Tablet      200 MG PO QDAY, #30 TAB 0 Refills         Reported         7/15/19       Ondansetron Odt (ONDANSETRON ODT) 4 Mg Tab.rapdis      4 MG PO Q8H PRN for NAUSEA, #25 TAB.RAPDIS 2 Refills         Prov: Linda Sandhu PA-C         3/4/19       Albuterol (Proair HFA) 8.5 Gm Inh      1 PUFFS INH RTQ4H for 30 Days, #1 INH 3 Refills         Prov: Victor M Field         6/27/18       Omeprazole (Omeprazole*) 40 Mg Capsule      40 MG PO BID, #60 CAP 2 Refills         Prov: Victor M Field         1/8/18       Albuterol/Ipratropium (Duoneb) 3 Ml Ampul.neb      3 ML INH Q4H PRN for SHORTNESS OF BREATH, #120 NEB 6 Refills         Prov: Victor M Field         4/17/17       Buprenorphine Hcl (BUPRENORPHINE HCL) 8 Mg Tab.subl      24 MG SL QDAY, #30 TAB.SL         Reported         4/8/16       SUMAtriptan Succinate (Imitrex) 100 Mg Tab      100 MG PO QDAY PRN for MIGRAINES, TAB         Reported         4/8/16       rOPINIRole HCl (rOPINIRole HCl) 2 Mg Tab      2 MG PO HS, TAB         Reported         7/14/14            Plan/Instructions            * Plan Of Care: ()            * Chronic conditions reviewed and taken into consideration for today's treatment       plan.      * Patient instructed to seek medical attention urgently for new or worsening       symptoms.      * Patient was educated/instructed on their diagnosis, treatment and medications       prior to discharge from the clinic today.            ASSESSMENT/PLAN:       1. Severe persistent asthma with eosinophilia.  The patient doing remarkably     well on fasenra.  We will continue fasenra injections every 8 weeks.  Continue     Singulair, albuterol, prn DuoNebs and Symbicort.      2. Sarcoidosis.  Does not appear to have a sarcoid flare at this time. Continue     monitoring.      3. Follow up in three months.      4. I have personally reviewed laboratory data,  imaging as well as previous     medical records.      Codes:  Phone Eval 11-20 mi [id number]            Electronically signed by Victor M Field  05/06/2020 15:44       Disclaimer: Converted document may not contain table formatting or lab diagrams. Please see [employer] for the authenticated document.

## 2021-05-28 NOTE — PROGRESS NOTES
Patient: YAMILA FREEMAN     Acct: [id number]     Report: #[id number]  UNIT #: [id number]     : 1970    Encounter Date:2019  PRIMARY CARE: EVERETT MARROQUIN  ***Signed***  --------------------------------------------------------------------------------------------------------------------  Chief Complaint      Encounter Date      May 7, 2019            Primary Care Provider      Everett Marroquin            Referring Provider      SELF,REFERRED            Patient Complaint      Patient is complaining of      patient here today for 1 month follow up, Sarcoidosis            VITALS      Height 5 ft 1 in / 154.94 cm      Weight 165 lbs 9 oz / 75.363306 kg      BSA 1.74 m2      BMI 31.3 kg/m2      Temperature 97.9 F / 36.61 C - Oral      Pulse 80      Respirations 15      Blood Pressure 102/50 Sitting, Left Arm      Pulse Oximetry 97%, room air            HPI      The patient is a very pleasant 48 year old white female patient of Dr. Field's    with a history of sarcoidosis, mild persistent asthma with positive methacholine    challenge test and hypergammaglobulinemia. I last saw her about 2 months ago at     which time she was on 7.5 mg of weekly methotrexate. She had not had her blood     work as ordered to see if we could increase her dose so I reordered CBC and CMP     for her and once those came back within normal limits, I increased her     methotrexate dose to 10 mg weekly. The patient states she has not really noticed    any significant difference in her breathing with the methotrexate. She currently    denies purulent sputum production, hemoptysis, fever or chills but did have some    increased dyspnea, dry cough and wheezing about 3 days ago. She presented to     urgent care and was treated with Levaquin and a shot of Solu Medrol and says she    is doing much better now. 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 She is using trelegy ellipta and  says she     has been tried on numerous other inhalers in the past. She admits that she is     not using trelegy every day, she may use it every other day. She has been on     Symbicort in the past and prefers to stay on trelegy rather than Symbicort and     spiriva.             I reviewed her Review of Systems, medical, surgical and family history and agree    with those as entered.      Copies To:   Victor M Field      Constitutional:  Denies: Fatigue, Fever, Weight gain, Weight loss, Chills,     Insomnia, Other      Respiratory/Breathing:  Complains of: Shortness of air, Cough; Denies: Wheezing,    Hemoptysis, Pleuritic pain, Other      Endocrine:  Denies: Polydipsia, Polyuria, Heat/cold intolerance, Abnorml     menstrual pattern, Diabetes, Other      Eyes:  Denies: Blurred vision, Vision Changes, Other      Ears, nose, mouth, throat:  Denies: Mouth lesions, Thrush, Throat pain,     Hoarseness, Allergies/Hay Fever, Post Nasal Drip, Headaches, Recent Head Injury,    Nose Bleeding, Neck Stiffness, Thyroid Mass, Hearing Loss, Ear Fullness, Dry Mo    uth, Nasal or Sinus Pain, Dry Lips, Nasal discharge, Nasal congestion, Other      Cardiovascular:  Denies: Palpitations, Syncope, Claudication, Chest Pain, Wake     up Gasping for air, Leg Swelling, Irregular Heart Rate, Cyanosis, Dyspnea on Ex    ertion, Other      Gastrointestinal:  Denies: Nausea, Constipation, Diarrhea, Abdominal pain,     Vomiting, Difficulty Swallowing, Reflux/Heartburn, Dysphagia, Jaundice,     Bloating, Melena, Bloody stools, Other      Genitourinary:  Denies: Urinary frequency, Incontinence, Hematuria, Urgency,     Nocturia, Dysuria, Testicular problems, Other      Musculoskeletal:  Denies: Joint Pain, Joint Stiffness, Joint Swelling, Myalgias,    Other      Hematologic/lymphatic:  DENIES: Lymphadenopathy, Bruising, Bleeding tendencies,     Other      Neurological:  Denies: Headache, Numbness, Weakness, Seizures, Other       Psychiatric:  Denies: Anxiety, Appropriate Effect, Depression, Other      Sleep:  No: Excessive daytime sleep, Morning Headache?, Snoring, Insomnia?, Stop    breathing at sleep?, Other      Integumentary:  Denies: Rash, Dry skin, Skin Warm to Touch, Other      Immunologic/Allergic:  Denies: Latex allergy, Seasonal allergies, Asthma,     Urticaria, Eczema, Other      Immunization status:  No: Up to date            FAMILY/SOCIAL/MEDICAL HX      Surgical History:  Yes: Appendectomy (2000), Bowel Surgery (COLONOSCOPY),     Cholecystectomy (2004), Oral Surgery (TONSILECTOMY), Orthopedic Surgery (BACK L4    AND L5, OTHER BACK SX, ROTATOR CUFF TO RT SHAVED), Throat Surgery (TONSILECTOMY     2004)      Diabetes - Family Hx:  Grandparent      Cancer/Type - Family Hx:  Mother      Other Family Medical History:  Grandparent      Is Father Still Living?:  Yes      Is Mother Still Living?:  Yes      Social History:  No Tobacco Use, No Alcohol Use, No Recreational Drug use      Smoking status:  Never smoker      Occupation:  disabled      Hysterectomy:  Yes      Anticoagulation Therapy:  No      Antibiotic Prophylaxis:  No      Medical History:  Yes: Chemotherapy/Cancer (MELANOMA RT LEG REMOVED), Congestive    Heart Failu, Depression (found out mother has cancer), Anxiety, Seizures     (possible in the past), Hemorrhoids/Rectal Prob (ABNORAML CT SCAN), High Blood     Pressure (AT TIMES), Reflux Disease, Shortness Of Breath (SARCOIDOSIS), Stroke     (unknown if this is definite or if something else ); No: Arthritis, Blood     Disease, Deafness or Ringing Ears, Diabetes, Sinus Trouble, Miscellaneous     Medical/oth      Psychiatric History      anxiety and depression            PREVENTION      Hx Influenza Vaccination:  Yes      Date Influenza Vaccine Given:  Oct 1, 2018      Influenza Vaccine Declined:  No      2 or More Falls Past Year?:  No (m)      Fall Past Year with Injury?:  No      Hx Pneumococcal Vaccination:  Yes       Encouraged to follow-up with:  PCP regarding preventative exams.      Chart initiated by      Rosie No CMA            ALLERGIES/MEDICATIONS      Allergies:        Coded Allergies:             TETANUS TOXOID, ADSORBED (Verified  Allergy, Unknown, HIGH FEVER, TALKING     OUT OF HEAD, 5/7/19)           BUPRENORPHINE (Verified  Adverse Reaction, Unknown, PT TAKES BUPRENORPHINE     REGULARLY, 5/7/19)                  TAKES THE FORM WITHOUT THE MELOXONE           MORPHINE (Verified  Adverse Reaction, Unknown, MIGRAINE, RASH, 5/7/19)           NALOXONE (Verified  Adverse Reaction, Unknown, RASH, HEADACHE, 5/7/19)           PENTAZOCINE (Verified  Adverse Reaction, Unknown, UNK, 5/7/19)      Medications    Last Reconciled on 5/7/19 14:46 by RANDI FRANCISCO      Levofloxacin (Levaquin*) Unknown Strength Tablet      PO Q2D, TAB 0 Refills         Reported         5/7/19       Montelukast Sodium (Singulair*) 10 Mg Tab      10 MG PO QDAY for 30 Days, #30 TAB 3 Refills         Prov: Victor M Field         4/23/19       Methotrexate Sodium (Methotrexate) 2.5 Mg Tab      10 MG PO Sa for 30 Days, #16 TAB         Prov: Linda Sandhu PA-C         4/16/19       Ondansetron Odt (ONDANSETRON ODT) 4 Mg Tab.rapdis      4 MG PO Q8H PRN for NAUSEA, #25 TAB.RAPDIS 2 Refills         Prov: Linda Sandhu PA-C         3/4/19       Folic Acid (Folic Acid*) 1 Mg Tablet      1 MG PO QDAY, #30 TAB 11 Refills         Prov: Victor M Field         1/15/19       Methotrexate (Methotrexate) 2.5 Mg Tablet      7.5 MG PO QSATURDAY, #30 TAB 2 Refills         Prov: Victor M Field         1/15/19       Methotrexate (Methotrexate) 2.5 Mg Tablet      0 PO QSATURDAY, TAB         Reported         12/4/18       Fluticasone/Umeclidin/Vilanter (Trelegy Ellipta 100-62.5-25) 1 Each Blst.w.dev      1 PUFF INH RTQDAY, #1 INH 5 Refills         Prov: Linda Sandhu PA-C         12/4/18       Folic Acid (Folic Acid*) 1 Mg Tablet      1 MG PO QDAY, #30  TAB 0 Refills         Reported         12/4/18       Albuterol (Proair HFA) 8.5 Gm Inh      1 PUFFS INH RTQ4H for 30 Days, #1 INH 3 Refills         Prov: Victor M Field         6/27/18       Cetirizine/Pseudoephedrine (ZyrTEC-D) 1 Each Tab.er.12h      1 TAB PO BID, #60 TAB 6 Refills         Prov: Victor M Field         6/26/18       Demetrius-Fluticasone (Fluticasone 50 mcg) 16 Gm Spray.susp      2 PUFFS NARE EACH QDAY, #1 BOTTLE 11 Refills         Prov: Victor M Field         4/12/18       Montelukast Sodium (Singulair*) 10 Mg Tab      10 MG PO HS, #30 TAB 11 Refills         Prov: Victor M Field         4/12/18       Omeprazole (Omeprazole*) 40 Mg Capsule      40 MG PO BID, #60 CAP 2 Refills         Prov: Victor M Field         1/8/18       Albuterol/Ipratropium (Duoneb) 3 Ml Ampul.neb      3 ML INH Q4H PRN for SHORTNESS OF BREATH, #120 NEB 6 Refills         Prov: Victor M Field         4/17/17       Demetrius-Mometasone (Nasonex*) 17 Gm Naspr      2 PUFFS NARE EACH QDAY, #1 BOTTLE 6 Refills         Prov: Victor M Field         12/29/16       Nebulizer/Compressor (Nebulizer) 1 Each Each      1 EACH XX ONCE, #1 EACH 0 Refills         Prov: Victor M Field         12/21/16       Buprenorphine Hcl (BUPRENORPHINE HCL) 8 Mg Tab.subl      24 MG SL QDAY, #30 TAB.SL         Reported         4/8/16       SUMAtriptan Succinate (Imitrex) 100 Mg Tab      100 MG PO QDAY PRN for MIGRAINES, TAB         Reported         4/8/16       Cetirizine Hcl (CETIRIZINE HCL) 10 Mg Tablet      10 MG PO QDAY, #30 TAB 0 Refills         Reported         4/8/16       Desvenlafaxine Succinate (Pristiq) 100 Mg Tab.sr.24h      100 MG PO QDAY, TAB.SR         Reported         12/1/15       tiZANidine HCl (tiZANidine HCl) 4 Mg Tab      4 MG PO TID PRN for MUSCLE SPASMS, TAB         Reported         12/1/15       Amitriptyline HCl (Amitriptyline HCl) 50 Mg Tablet      50 MG PO HS, #30 TAB         Reported         11/24/14       rOPINIRole HCl (Requip) 2 Mg  Tab      2 MG PO HS, TAB         Reported         7/14/14      Current Medications      Current Medications Reviewed 5/7/19            EXAM      GEN-patient appears stated age resting comfortable in no acute distress      Eyes-PERRL,  conjunctiva are normal in appearance extraocular muscles are     intact, no scleral icterus      Lymphatic-no swollen or enlarged cervical nodes, or axillary node, or femoral     nodes, or supraclavicular nodes      Mouth very poor dentition, no erythema no ulcerations oropharynx appears normal     no exudate no evidence of postnasal drip, MP       Neck-there are no palpable supraclavicular or cervical adenopathy, thyroid is     normal in appearance no apparent nodules, there is no inspiratory or expiratory     stridor      Respiratory-Mildly decreased breath sounds throughout, no wheezes, rhonchi or     crackles, normal work of breathing noted.        Cardiovascular-the heart rate is normal and regular S1 and S2 present with no     murmur or extra heart sounds, there is no JVD or pedal edema present      GI-the abdomen is normal in appearance, bowel sounds present and normal in all     quadrants no hepatosplenomegaly or masses felt      Extremities-no clubbing is present, pulses present in all extremities, capillary    refill time is normal      Musculoskeletal-Normal strength in upper and lower extremities, inspection shows    no evidence of muscle atrophy      Skin-skin is normal in appearance it is warm and dry, no rashes present, no     evidence of cyanosis, palpation reveals no masses      Neurological-the patient is alert and oriented to time place and person, moves     all 4 extremities, normal gait, normal affect and mood, CN2-12 intact      Psych-normal judgment and insight is good, normal mood and affect, alert and     oriented to person, place, and time, and date      Vtials      Vitals:             Height 5 ft 1 in / 154.94 cm           Weight 165 lbs 9 oz / 75.371317 kg            BSA 1.74 m2           BMI 31.3 kg/m2           Temperature 97.9 F / 36.61 C - Oral           Pulse 80           Respirations 15           Blood Pressure 102/50 Sitting, Left Arm           Pulse Oximetry 97%, room air            REVIEW      Results Reviewed      [facility] Results Reviewed?:  Yes Prev Lab Results, Yes Prev Radiology Results, Yes     Previous [facility] Records            Assessment      Sarcoidosis - D86.9            Asthma - J45.909            Notes      New Medications      * Levofloxacin (Levaquin*) Unknown Strength TABLET: PO Q2D      Discontinued Medications      * Azithromycin Z-Arvin (Zithromax Z-Arvin) 250 MG TABLET: 250 MG PO ASDIR #1         Instructions: Take 500 mg (two tablets) by mouth the first day, then 250 mg        (one tablet) daily until all taken.      New Diagnostics      * Comp Metabolic Panel, As Soon As Possible         Dx: Sarcoidosis - D86.9      * CBC, As Soon As Possible         Dx: Sarcoidosis - D86.9      ASSESSMENT:      1. Sarcoidosis now on methotrexate 10 mg weekly with daily folic acid.       2. Moderate persistent asthma with positive methacholine challenge test.       3. Hypogammaglobulinemia      4. Toxic drug monitoring.       5. Medical noncompliance.             PLAN:      1. I have discussed with the patient that it has been 2 months since her last     blood work has been done and she does need to have the follow up CMP and CBC     that I ordered at her last office visit. This needs to be done for monitoring of    leukopenia or liver dysfunction while on methotrexate. The patient verbalized     understanding.  I will not make any dose adjustments or refill her methotrexate     until I see her labs are within normal limits.       2. I have counseled her on the importance of using her inhalers daily as     prescribed. She is to continue trelegy 1 puff once daily and use albuterol as     needed.       3. Continue and complete her current course of Levaquin. She  appears to be     clinically improving from her recent upper respiratory infection.       4. Follow up with Dr. Field in 1-2 months.            Patient Education      Time Spent:  > 50% /Coord Care            Patient Education:        Sarcoidosis                 Disclaimer: Converted document may not contain table formatting or lab diagrams. Please see [employer] for the authenticated document.

## 2021-05-28 NOTE — PROGRESS NOTES
Patient: YAMILA FREEMAN     Acct: [id number]     Report: #[id number]  UNIT #: [id number]     : 1970    Encounter Date:2019  PRIMARY CARE: EVERETT MARROQUIN  ***Signed***  --------------------------------------------------------------------------------------------------------------------  Chief Complaint      Encounter Date      2019            Primary Care Provider      Everett Marroquin            Referring Provider      SELF,REFERRED            Patient Complaint      Patient is complaining of      Pt here for 1-2m f/u, sarcoidosis            VITALS      Height 5 ft 1 in / 154.94 cm      Weight 162 lbs 4 oz / 73.668640 kg      BSA 1.73 m2      BMI 30.7 kg/m2      Temperature 98.1 F / 36.72 C - Oral      Pulse 79      Respirations 16      Blood Pressure 102/58 Sitting, Right Arm      Pulse Oximetry 98%, Room air            HPI      The patient is a 48 year old female with sarcoidosis on chronic methotrexate     therapy. She is here today for follow up. She is doing well in regards to her     breathing, has not been sick near as much since she has been on the     methotrexate, tolerating the methotrexate well.  She is complaining of some     nausea, vomiting and some abdominal pain, worse when she eats.  She has a     history of gastric ulcer/esophageal ulcerations in the past and is set up to     undergo EGD soon with Dr. Guillermo.  She feels that her breathing is at her     baseline. No increased shortness of breath at this time.            ROS      Constitutional:  Denies: Fatigue, Fever, Weight gain, Weight loss, Chills,     Insomnia, Other      Respiratory/Breathing:  Complains of: Cough; Denies: Shortness of air, Wheezing,    Hemoptysis, Pleuritic pain, Other      Endocrine:  Denies: Polydipsia, Polyuria, Heat/cold intolerance, Abnorml     menstrual pattern, Diabetes, Other      Eyes:  Denies: Blurred vision, Vision Changes, Other      Ears, nose, mouth, throat:  Denies: Mouth lesions, Thrush,  Throat pain,     Hoarseness, Allergies/Hay Fever, Post Nasal Drip, Headaches, Recent Head Injury,    Nose Bleeding, Neck Stiffness, Thyroid Mass, Hearing Loss, Ear Fullness, Dry     Mouth, Nasal or Sinus Pain, Dry Lips, Nasal discharge, Nasal congestion, Other      Cardiovascular:  Denies: Palpitations, Syncope, Claudication, Chest Pain, Wake     up Gasping for air, Leg Swelling, Irregular Heart Rate, Cyanosis, Dyspnea on     Exertion, Other      Gastrointestinal:  Denies: Nausea, Constipation, Diarrhea, Abdominal pain, Vomit    ing, Difficulty Swallowing, Reflux/Heartburn, Dysphagia, Jaundice, Bloating,     Melena, Bloody stools, Other      Genitourinary:  Denies: Urinary frequency, Incontinence, Hematuria, Urgency,     Nocturia, Dysuria, Testicular problems, Other      Musculoskeletal:  Denies: Joint Pain, Joint Stiffness, Joint Swelling, Myalgias,    Other      Hematologic/lymphatic:  DENIES: Lymphadenopathy, Bruising, Bleeding tendencies,     Other      Neurological:  Denies: Headache, Numbness, Weakness, Seizures, Other      Psychiatric:  Denies: Anxiety, Appropriate Effect, Depression, Other      Sleep:  No: Excessive daytime sleep, Morning Headache?, Snoring, Insomnia?, Stop    breathing at sleep?, Other      Integumentary:  Denies: Rash, Dry skin, Skin Warm to Touch, Other      Immunologic/Allergic:  Denies: Latex allergy, Seasonal allergies, Asthma,     Urticaria, Eczema, Other      Immunization status:  No: Up to date            FAMILY/SOCIAL/MEDICAL HX      Surgical History:  Yes: Appendectomy (2000), Bowel Surgery (COLONOSCOPY),     Cholecystectomy (2004), Oral Surgery (TONSILECTOMY), Orthopedic Surgery (BACK L4    AND L5, OTHER BACK SX, ROTATOR CUFF TO RT SHAVED), Throat Surgery (TONSILECTOMY     2004)      Diabetes - Family Hx:  Grandparent      Cancer/Type - Family Hx:  Mother      Other Family Medical History:  Grandparent      Is Father Still Living?:  Yes      Is Mother Still Living?:  Yes       Social History:  No Tobacco Use, No Alcohol Use, No Recreational Drug use      Smoking status:  Never smoker      Occupation:  disabled      Hysterectomy:  Yes      Anticoagulation Therapy:  No      Antibiotic Prophylaxis:  No      Medical History:  Yes: Chemotherapy/Cancer (MELANOMA RT LEG REMOVED), Congestive    Heart Failu, Depression (found out mother has cancer), Anxiety, Seizures     (possible in the past), Hemorrhoids/Rectal Prob (ABNORAML CT SCAN), High Blood     Pressure (AT TIMES), Reflux Disease, Shortness Of Breath (SARCOIDOSIS), Stroke     (unknown if this is definite or if something else ); No: Arthritis, Blood     Disease, Deafness or Ringing Ears, Diabetes, Sinus Trouble, Miscellaneous     Medical/oth      Psychiatric History      Anxiety and depression            PREVENTION      Hx Influenza Vaccination:  Yes      Date Influenza Vaccine Given:  Oct 1, 2018      Influenza Vaccine Declined:  No      2 or More Falls Past Year?:  No      Fall Past Year with Injury?:  No      Hx Pneumococcal Vaccination:  Yes      Encouraged to follow-up with:  PCP regarding preventative exams.      Chart initiated by      Dasha Allen MA            ALLERGIES/MEDICATIONS      Allergies:        Coded Allergies:             TETANUS TOXOID, ADSORBED (Verified  Allergy, Unknown, HIGH FEVER, TALKING     OUT OF HEAD, 7/1/19)           BUPRENORPHINE (Verified  Adverse Reaction, Unknown, PT TAKES BUPRENORPHINE     REGULARLY, 7/1/19)                  TAKES THE FORM WITHOUT THE MELOXONE           MORPHINE (Verified  Adverse Reaction, Unknown, MIGRAINE, RASH, 7/1/19)           NALOXONE (Verified  Adverse Reaction, Unknown, RASH, HEADACHE, 7/1/19)           PENTAZOCINE (Verified  Adverse Reaction, Unknown, UNK, 7/1/19)      Medications    Last Reconciled on 7/1/19 14:34 by VICTOR M MCGARRY MD      Methotrexate Sodium (Methotrexate) 2.5 Mg Tab      10 MG PO Sa for 30 Days, #16 TAB         Prov: Victor M Mcgarry         6/17/19        Montelukast Sodium (Singulair*) 10 Mg Tab      10 MG PO QDAY for 30 Days, #30 TAB 3 Refills         Prov: Victor M Field         4/23/19       Ondansetron Odt (ONDANSETRON ODT) 4 Mg Tab.rapdis      4 MG PO Q8H PRN for NAUSEA, #25 TAB.RAPDIS 2 Refills         Prov: Linda Sandhu PA-C         3/4/19       Folic Acid (Folic Acid*) 1 Mg Tablet      1 MG PO QDAY, #30 TAB 11 Refills         Prov: Victor M Field         1/15/19       Fluticasone/Umeclidin/Vilanter (Trelegy Ellipta 100-62.5-25) 1 Each Blst.w.dev      1 PUFF INH RTQDAY, #1 INH 5 Refills         Prov: Linda Sandhu PA-C         12/4/18       Albuterol (Proair HFA) 8.5 Gm Inh      1 PUFFS INH RTQ4H for 30 Days, #1 INH 3 Refills         Prov: Victor M Field         6/27/18       Cetirizine/Pseudoephedrine (ZyrTEC-D) 1 Each Tab.er.12h      1 TAB PO BID, #60 TAB 6 Refills         Prov: Victor M Field         6/26/18       Demetrius-Fluticasone (Fluticasone 50 mcg) 16 Gm Spray.susp      2 PUFFS NARE EACH QDAY, #1 BOTTLE 11 Refills         Prov: Victor M Field         4/12/18       Omeprazole (Omeprazole*) 40 Mg Capsule      40 MG PO BID, #60 CAP 2 Refills         Prov: Victor M Field         1/8/18       Albuterol/Ipratropium (Duoneb) 3 Ml Ampul.neb      3 ML INH Q4H PRN for SHORTNESS OF BREATH, #120 NEB 6 Refills         Prov: Victor M Field         4/17/17       Demetrius-Mometasone (Nasonex*) 17 Gm Naspr      2 PUFFS NARE EACH QDAY, #1 BOTTLE 6 Refills         Prov: Victor M Field         12/29/16       Nebulizer/Compressor (Nebulizer) 1 Each Each      1 EACH XX ONCE, #1 EACH 0 Refills         Prov: Victor M Field         12/21/16       Buprenorphine Hcl (BUPRENORPHINE HCL) 8 Mg Tab.subl      24 MG SL QDAY, #30 TAB.SL         Reported         4/8/16       SUMAtriptan Succinate (Imitrex) 100 Mg Tab      100 MG PO QDAY PRN for MIGRAINES, TAB         Reported         4/8/16       Cetirizine Hcl (CETIRIZINE HCL) 10 Mg Tablet      10 MG PO QDAY, #30 TAB 0  Refills         Reported         4/8/16       Desvenlafaxine Succinate (Pristiq) 100 Mg Tab.sr.24h      100 MG PO QDAY, TAB.SR         Reported         12/1/15       tiZANidine HCl (tiZANidine HCl) 4 Mg Tab      4 MG PO TID PRN for MUSCLE SPASMS, TAB         Reported         12/1/15       Amitriptyline HCl (Amitriptyline HCl) 50 Mg Tablet      50 MG PO HS, #30 TAB         Reported         11/24/14       rOPINIRole HCl (Requip) 2 Mg Tab      2 MG PO HS, TAB         Reported         7/14/14      Current Medications      Current Medications Reviewed 7/1/19            EXAM      GEN-patient appears stated age resting comfortable in no acute distress      Eyes-PERRL,  conjunctiva are normal in appearance extraocular muscles are     intact, no scleral icterus      Lymphatic-no swollen or enlarged cervical nodes, or axillary node, or femoral     nodes, or supraclavicular nodes      Mouth normal dentition, no erythema no ulcerations oropharynx appears normal no     exudate no evidence of postnasal drip      Neck-there are no palpable supraclavicular or cervical adenopathy, thyroid is     normal in appearance no apparent nodules, there is no inspiratory or expiratory     stridor      Respiratory-patient exhibits normal work of breathing, speaking in full     sentences without difficulty, the chest is normal in appearance, clear to     auscultation with no wheezes rales or rhonchi, chest is normal to percussion on     both the right and left sides      Cardiovascular-the heart rate is normal and regular S1 and S2 present with no     murmur or extra heart sounds, there is no JVD or pedal edema present      GI-the abdomen is normal in appearance, bowel sounds present and normal in all     quadrants no hepatosplenomegaly or masses felt      Extremities-no clubbing is present, pulses present in all extremities, capillary    refill time is normal      Skin-skin is normal in appearance it is warm and dry, no rashes present, no      evidence of cyanosis, palpation reveals no masses      Neurological-the patient is alert and oriented to time place and person, moves     all 4 extremities, normal gait, normal affect and mood, CN2-12 intact      Psych-normal judgment and insight is good, normal mood and affect, alert and     oriented to person, place, and time, and date      Vtials      Vitals:             Height 5 ft 1 in / 154.94 cm           Weight 162 lbs 4 oz / 73.221917 kg           BSA 1.73 m2           BMI 30.7 kg/m2           Temperature 98.1 F / 36.72 C - Oral           Pulse 79           Respirations 16           Blood Pressure 102/58 Sitting, Right Arm           Pulse Oximetry 98%, Room air            REVIEW      Results Reviewed      [facility] Results Reviewed?:  Yes Prev Lab Results, Yes Prev Radiology Results, Yes     Previous [facility] Records            Assessment      Notes      Discontinued Medications      * Montelukast Sodium (Singulair*) 10 MG TAB: 10 MG PO HS #30      * Methotrexate 2.5 MG TABLET: 7.5 MG PO QSATURDAY #30      * Methotrexate Sodium (Methotrexate) 2.5 MG TAB: 10 MG PO Sa 30 Days #16      New Diagnostics      * CBC, 3 Months         Dx: Sarcoidosis - D86.9      * CMP Comp Metabolic Panel, 3 Months         Dx: Sarcoidosis - D86.9      ASSESSMENT:       1.  Sarcoidosis.      2.  Toxic drug monitoring.      3.  Persistent asthma with positive methacholine challenge.      4.  Hypergammaglobulinemia.              PLAN:      1. Continue methotrexate for the sarcoidosis.      2. Continue Folic acid.      3. Continue current inhalers for her asthma.      4. Given elevated serum eosinophilia and positive methacholine challenge, it the    patient's symptoms persistent would be a candidate for anti-IL5 therapy, however    symptoms seem to be controlled at this time.      5. We will see patient back in three months to follow labs and to monitor for     symptoms.      6. I have personally reviewed laboratory data, imaging as well  as previous     medical records.            Patient Education      Education resources provided:  Yes      Patient Education Provided:  Sarcoidosis                 Disclaimer: Converted document may not contain table formatting or lab diagrams. Please see [employer] for the authenticated document.

## 2021-05-28 NOTE — PROGRESS NOTES
Patient: YAMILA FREEMAN     Acct: [id number]     Report: #[id number]  UNIT #: [id number]     : 1970    Encounter Date:01/15/2019  PRIMARY CARE: ARASH MARROQUIN  ***Signed***  --------------------------------------------------------------------------------------------------------------------  Chief Complaint      Encounter Date      Benji 15, 2019            Primary Care Provider      Arash Marroquin            Referring Provider      SELF,REFERRED            Patient Complaint      Patient is complaining of      Pt here for 1m f/u, sarcoidosis            VITALS      Height 5 ft 1 in / 154.94 cm      Weight 167 lbs 6 oz / 75.238150 kg      BSA 1.75 m2      BMI 31.6 kg/m2      Temperature 98.8 F / 37.11 C - Oral      Pulse 63      Respirations 14      Blood Pressure 116/60 Sitting, Right Arm      Pulse Oximetry 98%, Room air            HPI      The patient is a very pleasant 48 year old white female with history of biopsy     proven sarcoidosis here today for follow up.             The patient has had numerous exacerbations of bronchitis since her last office     visit.  She was most recently seen by a physician in [facility]     emergency room who placed her on 2.5 methotrexate once weekly given her     recurrent exacerbations and diagnosis of sarcoidosis. I was reluctant to start     this in the past since the patient had mildly decreased levels of IgG and     thoughts that her low IgG level could be causing some of her recurrent     infections. I was also concerned that maybe the patient could have elevated IgE     level which she certainly does and it could also be causing her recurrent     symptoms. However the patient was started on methotrexate and quickly stopped it    until she could see me. The patient complains of some shortness of breath that     occurs most day, no particular aggravating factors at this time. She is better     when she takes steroids. She does have chronic cough that is  nonproductive. She     has some chest tightness and reports compliance with her bronchodilator     therapies and feel they help with her symptoms. She denies any chest pain, lower    extremity edema, or orthopnea. Shortness of breath is moderately severe in     nature and recurring. It is worse when she has these episodes of bronchitis and     feels that it negatively impacts her quality of life. She has no mucopurulent     sputum production or fevers or chills at this time. She denies having any skin     changes, denies any arthropathy at this time. She has no evidence of     hypercalcemia or vision changes consistent with active sarcoidosis. She does     have a CT scan that shows pulmonary nodules along the bronchovascular bundle and    old lymphadenopathy consistent with her history of sarcoidosis.            ROS      Constitutional:  Denies: Fatigue, Fever, Weight gain, Weight loss, Chills,     Insomnia, Other      Respiratory/Breathing:  Complains of: Shortness of air, Wheezing, Cough; Denies:    Hemoptysis, Pleuritic pain, Other      Endocrine:  Denies: Polydipsia, Polyuria, Heat/cold intolerance, Abnorml     menstrual pattern, Diabetes, Other      Eyes:  Denies: Blurred vision, Vision Changes, Other      Ears, nose, mouth, throat:  Denies: Mouth lesions, Thrush, Throat pain,     Hoarseness, Allergies/Hay Fever, Post Nasal Drip, Headaches, Recent Head Injury,    Nose Bleeding, Neck Stiffness, Thyroid Mass, Hearing Loss, Ear Fullness, Dry     Mouth, Nasal or Sinus Pain, Dry Lips, Nasal discharge, Nasal congestion, Other      Cardiovascular:  Denies: Palpitations, Syncope, Claudication, Chest Pain, Wake     up Gasping for air, Leg Swelling, Irregular Heart Rate, Cyanosis, Dyspnea on     Exertion, Other      Gastrointestinal:  Denies: Nausea, Constipation, Diarrhea, Abdominal pain,     Vomiting, Difficulty Swallowing, Reflux/Heartburn, Dysphagia, Jaundice,     Bloating, Melena, Bloody stools, Other       Genitourinary:  Denies: Urinary frequency, Incontinence, Hematuria, Urgency,     Nocturia, Dysuria, Testicular problems, Other      Musculoskeletal:  Denies: Joint Pain, Joint Stiffness, Joint Swelling, Myalgias,    Other      Hematologic/lymphatic:  DENIES: Lymphadenopathy, Bruising, Bleeding tendencies,     Other      Neurological:  Denies: Headache, Numbness, Weakness, Seizures, Other      Psychiatric:  Denies: Anxiety, Appropriate Effect, Depression, Other      Sleep:  No: Excessive daytime sleep, Morning Headache?, Snoring, Insomnia?, Stop    breathing at sleep?, Other      Integumentary:  Denies: Rash, Dry skin, Skin Warm to Touch, Other      Immunologic/Allergic:  Denies: Latex allergy, Seasonal allergies, Asthma, U    rticaria, Eczema, Other      Immunization status:  No: Up to date            FAMILY/SOCIAL/MEDICAL HX      Surgical History:  Yes: Appendectomy (2000), Bowel Surgery (COLONOSCOPY),     Cholecystectomy (2004), Oral Surgery (TONSILECTOMY), Orthopedic Surgery (BACK L4    AND L5, OTHER BACK SX, ROTATOR CUFF TO RT SHAVED), Throat Surgery (TONSILECTOMY     2004)      Diabetes - Family Hx:  Grandparent      Cancer/Type - Family Hx:  Mother      Other Family Medical History:  Grandparent      Is Father Still Living?:  Yes      Is Mother Still Living?:  Yes      Social History:  No Tobacco Use, No Alcohol Use, No Recreational Drug use      Smoking status:  Never smoker      Occupation:  disabled      Hysterectomy:  Yes      Anticoagulation Therapy:  No      Antibiotic Prophylaxis:  No      Medical History:  Yes: Chemotherapy/Cancer (MELANOMA RT LEG REMOVED), Congestive    Heart Failu, Depression (found out mother has cancer), Anxiety, Seizures     (possible in the past), Hemorrhoids/Rectal Prob (ABNORAML CT SCAN), High Blood     Pressure (AT TIMES), Reflux Disease, Shortness Of Breath (SARCOIDOSIS), Stroke     (unknown if this is definite or if something else ); No: Arthritis, Blood     Disease,  Deafness or Ringing Ears, Diabetes, Sinus Trouble, Miscellaneous     Medical/oth      Psychiatric History      Anxiety and Depression            PREVENTION      Hx Influenza Vaccination:  Yes      Date Influenza Vaccine Given:  Oct 1, 2018      Influenza Vaccine Declined:  No      2 or More Falls Past Year?:  No      Fall Past Year with Injury?:  No      Hx Pneumococcal Vaccination:  Yes      Encouraged to follow-up with:  PCP regarding preventative exams.      Chart initiated by      Dasha Allen MA            ALLERGIES/MEDICATIONS      Allergies:        Coded Allergies:             TETANUS TOXOID, ADSORBED (Verified  Allergy, Unknown, HIGH FEVER, TALKING     OUT OF HEAD, 1/15/19)           BUPRENORPHINE (Verified  Adverse Reaction, Unknown, PT TAKES BUPRENORPHINE     REGULARLY, 1/15/19)                  TAKES THE FORM WITHOUT THE MELOXONE           MORPHINE (Verified  Adverse Reaction, Unknown, MIGRAINE, RASH, 1/15/19)           NALOXONE (Verified  Adverse Reaction, Unknown, RASH, HEADACHE, 1/15/19)           PENTAZOCINE (Verified  Adverse Reaction, Unknown, UNK, 1/15/19)      Medications    Last Reconciled on 1/15/19 09:03 by VICTOR M MCGARRY MD      Levofloxacin (Levofloxacin) 500 Mg Tablet      500 MG PO QDAY, TAB         Reported         1/15/19       predniSONE* (predniSONE*) 10 Mg Tablet      10 MG PO ASDIR, #48 TAB         Reported         1/15/19       Methotrexate (Methotrexate) 2.5 Mg Tablet      0 PO QSATURDAY, TAB         Reported         12/4/18       Fluticasone/Umeclidin/Vilanter (Trelegy Ellipta 100-62.5-25) 1 Each Blst.w.dev      1 PUFF INH RTQDAY, #1 INH 5 Refills         Prov: Linda Sandhu PA-C         12/4/18       Folic Acid (Folic Acid*) 1 Mg Tablet      1 MG PO QDAY, #30 TAB 0 Refills         Reported         12/4/18       Albuterol (Proair HFA) 8.5 Gm Inh      1 PUFFS INH RTQ4H for 30 Days, #1 INH 3 Refills         Prov: Victor M Mcgarry         6/27/18       Cetirizine/Pseudoephedrine  (ZyrTEC-D) 1 Each Tab.er.12h      1 TAB PO BID, #60 TAB 6 Refills         Prov: Victor M Field         6/26/18       Ondansetron HCl (Zofran ODT*) 4 Mg Tab.rapdis      4 MG PO Q8H PRN for NAUSEA, #25 TAB.RAPDIS 0 Refills         Prov: Linda Sandhu PA-C         5/25/18       Demetrius-Fluticasone (Fluticasone 50 mcg) 16 Gm Spray.susp      2 PUFFS NARE EACH QDAY, #1 BOTTLE 11 Refills         Prov: Victor M Field         4/12/18       Montelukast Sodium (Singulair*) 10 Mg Tab      10 MG PO HS, #30 TAB 11 Refills         Prov: Victor M Field         4/12/18       Omeprazole (Omeprazole*) 40 Mg Capsule      40 MG PO BID, #60 CAP 2 Refills         Prov: Victor M Field         1/8/18       Albuterol/Ipratropium (Duoneb) 3 Ml Ampul.neb      3 ML INH Q4H PRN for SHORTNESS OF BREATH, #120 NEB 6 Refills         Prov: Victor M Field         4/17/17       Demetrius-Mometasone (Nasonex*) 17 Gm Naspr      2 PUFFS NARE EACH QDAY, #1 BOTTLE 6 Refills         Prov: Victor M Field         12/29/16       Nebulizer/Compressor (Nebulizer) 1 Each Each      1 EACH XX ONCE, #1 EACH 0 Refills         Prov: Victor M Field         12/21/16       Buprenorphine Hcl (BUPRENORPHINE HCL) 8 Mg Tab.subl      24 MG SL QDAY, #30 TAB.SL         Reported         4/8/16       SUMAtriptan Succinate (Imitrex) 100 Mg Tab      100 MG PO QDAY PRN for MIGRAINES, TAB         Reported         4/8/16       Cetirizine Hcl (CETIRIZINE HCL) 10 Mg Tablet      10 MG PO QDAY, #30 TAB 0 Refills         Reported         4/8/16       Montelukast Sodium (Singulair*) 10 Mg Tab      10 MG PO QDAY, #30 TAB 6 Refills         Prov: Victor M Field         12/1/15       Desvenlafaxine Succinate (Pristiq) 100 Mg Tab.sr.24h      100 MG PO QDAY, TAB.SR         Reported         12/1/15       tiZANidine HCl (tiZANidine HCl) 4 Mg Tab      4 MG PO TID PRN for MUSCLE SPASMS, TAB         Reported         12/1/15       Amitriptyline HCl (Amitriptyline HCl) 50 Mg Tablet      50 MG PO HS, #30  TAB         Reported         11/24/14       rOPINIRole HCl (Requip) 2 Mg Tab      2 MG PO HS, TAB         Reported         7/14/14      Current Medications      Current Medications Reviewed 1/15/19            EXAM      GEN-patient appears stated age resting comfortable in no acute distress      Eyes-PERRL,  conjunctiva are normal in appearance extraocular muscles are     intact, no scleral icterus      Lymphatic-no swollen or enlarged cervical nodes, or axillary node, or femoral     nodes, or supraclavicular nodes      Mouth normal dentition, no erythema no ulcerations oropharynx appears normal no     exudate no evidence of postnasal drip      Neck-there are no palpable supraclavicular or cervical adenopathy, thyroid is     normal in appearance no apparent nodules, there is no inspiratory or expiratory     stridor      Respiratory-patient exhibits normal work of breathing, speaking in full     sentences without difficulty, the chest is normal in appearance, clear to     auscultation with no wheezes rales or rhonchi, chest is normal to percussion on     both the right and left sides      Cardiovascular-the heart rate is normal and regular S1 and S2 present with no     murmur or extra heart sounds, there is no JVD or pedal edema present      GI-the abdomen is normal in appearance, bowel sounds present and normal in all     quadrants no hepatosplenomegaly or masses felt      Extremities-no clubbing is present, pulses present in all extremities, capillary    refill time is normal      Skin-skin is normal in appearance it is warm and dry, no rashes present, no     evidence of cyanosis, palpation reveals no masses      Neurological-the patient is alert and oriented to time place and person, moves     all 4 extremities, normal gait, normal affect and mood, CN2-12 intact      Psych-normal judgment and insight is good, normal mood and affect, alert and     oriented to person, place, and time, and date      Vtials       Vitals:             Height 5 ft 1 in / 154.94 cm           Weight 167 lbs 6 oz / 75.201897 kg           BSA 1.75 m2           BMI 31.6 kg/m2           Temperature 98.8 F / 37.11 C - Oral           Pulse 63           Respirations 14           Blood Pressure 116/60 Sitting, Right Arm           Pulse Oximetry 98%, Room air            REVIEW      Results Reviewed      [facility] Results Reviewed?:  Yes Prev Lab Results, Yes Prev Radiology Results, Yes     Previous [facility] Records            Assessment      Sarcoidosis - D86.9            Asthma - J45.909            Notes      New Medications      * predniSONE* 10 MG TABLET: 10 MG PO ASDIR #48         Instructions: Take 60 mg by mouth x 3 days, 40 mg x 3 days, 30 mg x 3 days,        20 mg x 3 days, then 10 mg x 3 days.      * Levofloxacin 500 MG TABLET: 500 MG PO QDAY      * Methotrexate 2.5 MG TABLET: 7.5 MG PO QSATURDAY #30      * Folic Acid (Folic Acid*) 1 MG TABLET: 1 MG PO QDAY #30      New Diagnostics      * Immunoglobulin  E (I, Week         Dx: Sarcoidosis - D86.9      * CBC, Month         Dx: Sarcoidosis - D86.9      * Methacholine Challenge Test, SCHEDULED PROCEDURE         Dx: Asthma - J45.909      * Anticytoplas. Neutro ANCA, Routine         Dx: Asthma - J45.909      * HCV ANTIBODY BY RIBA, Stat         Dx: Sarcoidosis - D86.9      * Hepatitis B Titer, Routine         Dx: Sarcoidosis - D86.9      ASSESSMENT/PLAN:      1. Sarcoidosis. At this time the patient is having recurrent episodes of     bronchitis with worsening shortness of breath. I think it is fair that she     receives a trial for treatment however I am torn at this time if the patient's     symptoms could be related to asthma or her sarcoidosis. It is very difficult to     determine which is actually causing her issues at this time since sarcoidosis     can also mimic asthma.  The patient does indeed have elevated IgE level and has     some IgE level elevation dating back to April 2018. At this time I  have elected     to treat the patient with methotrexate. We will start her on 7.5 mg q weekly a    long with folic acid. I have discussed risks versus benefits of methotrexate     including pancytopenia as well as hepatitis and the need to persistently take     folic acid on a daily basis. The patient verbalized understanding and would like    to proceed.       2. We will plan on obtaining an ANCA level to rule out ANCA associated     vasculitis which can have some similar type features to the patient's current     presentation.       3. We will obtain methacholine challenge test to try to document asthma.       4. We will have the patient see RANDI Blum in 1 month. She can repeat     blood work on the patient including CBC and comprehensive metabolic profile.       5. I have checked a hepatitis panel, hep B titer, hep C prior to starting     methotrexate. I have discussed with the patient that if she does not respond to     methotrexate over the course of the next 3-4 months after coming on a max dose,     it may indeed by asthma causing her issues. If we can document that the patient     has asthma with a positive methacholine challenge test and serum eosinophilia     she may be a candidate for anti-IL5 therapy in the future if she does not     respond to treatment therapy.       6. Toxic drug monitoring. We will follow with monthly labs.,       7. Hypogammaglobulinemia. Mild in nature. We will repeat after the patient has b    een started on methotrexate.       8.  Dyspnea. Differential as mentioned above. We will continue the patient on     bronchodilator therapies.       9. I have personally reviewed laboratory data, imaging as well as previous     medical records.            Patient Education      Education resources provided:  Yes            Patient Education:        Sarcoidosis                 Disclaimer: Converted document may not contain table formatting or lab diagrams. Please see [employer]  [facility] for the authenticated document.

## 2021-05-28 NOTE — PROGRESS NOTES
Patient: YAMILA FREEMAN     Acct: [id number]     Report: #[id number]  UNIT #: [id number]     : 1970    Encounter Date:2018  PRIMARY CARE: EVERETT MARROQUIN  ***Signed***  --------------------------------------------------------------------------------------------------------------------  Chief Complaint      Encounter Date      2018            Primary Care Provider      Everett Marroquin            Referring Provider      SELF,REFERRED PATIENT            Patient Complaint      Patient is complaining of      Pt here for cough            VITALS      Height 5 ft 1.00 in / 154.94 cm      Weight 164 lbs 4.000 oz / 74.810751 kg      BSA 1.82 m2      BMI 31.0 kg/m2      Temperature 97.7 F / 36.5 C - Oral      Pulse 99      Respirations 14      Blood Pressure 111/70 Sitting, Right Arm      Pulse Oximetry 97%, Room air            HPI      The patient is a very pleasant 47 year old white female with history of     sarcoidosis here for follow up.             The patient has had acute illness now 3 times over the course of the preceding     months. 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 She feels that since she started this job she has been exposed to more     illness and she has indeed had 3 episodes of upper respiratory infection. She     also feels that the change in the weather and tree pollen are making her     symptoms worse as well. She denies having any hemoptysis, no swollen or     enlarged lymph nodes and she has no recent abnormal imaging.  She has been on     numerous rounds of antibiotics and steroids with recurrence of her symptoms     when the steroids are weaned off. She reports compliance with the use of her     arnuity but is having to use her rescue inhaler more. At this time she denies     having any associated gastrointestinal symptoms, no nausea and vomiting and     diarrhea, no associated chest pains or heart palpitations, no lower extremity      edema.            ROS      Constitutional:  Denies: Fatigue, Fever, Weight gain, Weight loss, Chills,     Insomnia, Other      Respiratory/Breathing:  Complains of: Shortness of air, Wheezing, Cough, Denies    : Hemoptysis, Pleuritic pain, Other      Endocrine:  Denies: Polydipsia, Polyuria, Heat/cold intolerance, Abnorml     menstrual pattern, Diabetes, Other      Eyes:  Denies: Blurred vision, Vision Changes, Other      Ears, nose, mouth, throat:  Denies: Mouth lesions, Thrush, Throat pain,     Hoarseness, Allergies/Hay Fever, Post Nasal Drip, Headaches, Recent Head Injury    , Nose Bleeding, Neck Stiffness, Thyroid Mass, Hearing Loss, Ear Fullness, Dry     Mouth, Nasal or Sinus Pain, Dry Lips, Nasal discharge, Nasal congestion, Other      Cardiovascular:  Denies: Palpitations, Syncope, Claudication, Chest Pain, Wake     up Gasping for air, Leg Swelling, Irregular Heart Rate, Cyanosis, Dyspnea on     Exertion, Other      Gastrointestinal:  Denies: Nausea, Constipation, Diarrhea, Abdominal pain,     Vomiting, Difficulty Swallowing, Reflux/Heartburn, Dysphagia, Jaundice, Bloating    , Melena, Bloody stools, Other      Genitourinary:  Denies: Urinary frequency, Incontinence, Hematuria, Urgency,     Nocturia, Dysuria, Testicular problems, Other      Musculoskeletal:  Denies: Joint Pain, Joint Stiffness, Joint Swelling, Myalgias    , Other      Hematologic/lymphatic:  DENIES: Lymphadenopathy, Bruising, Bleeding tendencies,     Other      Neurological:  Denies: Headache, Numbness, Weakness, Seizures, Other      Psychiatric:  Denies: Anxiety, Appropriate Effect, Depression, Other      Sleep:  No: Excessive daytime sleep, Morning Headache?, Snoring, Insomnia?,     Stop breathing at sleep?, Other      Integumentary:  Denies: Rash, Dry skin, Skin Warm to Touch, Other      Immunologic/Allergic:  Denies: Latex allergy, Seasonal allergies, Asthma,     Urticaria, Eczema, Other      Immunization status:  No: Up to date             FAMILY/SOCIAL/MEDICAL HX      Surgical History:  Yes: Appendectomy (2000), Bowel Surgery (COLONOSCOPY),     Cholecystectomy (2004), Oral Surgery (TONSILECTOMY), Orthopedic Surgery (BACK     L4 AND L5, OTHER BACK SX, ROTATOR CUFF TO RT SHAVED), Throat Surgery (    TONSILECTOMY 2004)      Diabetes - Family Hx:  Grandparent      Cancer/Type - Family Hx:  Mother      Other Family Medical History:  Grandparent      Is Father Still Living?:  Yes      Is Mother Still Living?:  Yes      Social History:  No Tobacco Use, No Alcohol Use, No Recreational Drug use      Smoking status:  Never smoker      Occupation:  disabled      Hysterectomy:  Yes      Anticoagulation Therapy:  No      Antibiotic Prophylaxis:  No      Medical History:  Yes: Chemotherapy/Cancer (MELANOMA RT LEG REMOVED),     Congestive Heart Failu, Depression (found out mother has cancer), Anxiety,     Seizures (possible in the past), Hemorrhoids/Rectal Prob (ABNORAML CT SCAN),     High Blood Pressure (AT TIMES), Reflux Disease, Shortness Of Breath (SARCOIDOSIS    ), Stroke (unknown if this is definite or if something else ), No: Arthritis,     Blood Disease, Deafness or Ringing Ears, Diabetes, Sinus Trouble, Miscellaneous     Medical/oth      Psychiatric History      Anxiety and depression            PREVENTION      Hx Influenza Vaccination:  Yes      Date Influenza Vaccine Given:  Oct 1, 2017      Influenza Vaccine Declined:  No      2 or More Falls Past Year?:  No      Fall Past Year with Injury?:  No      Hx Pneumococcal Vaccination:  Yes      Encouraged to follow-up with:  PCP regarding preventative exams.      Chart initiated by      Dasha Allen MA            ALLERGIES/MEDICATIONS      Allergies:        Coded Allergies:             TETANUS TOXOID, ADSORBED (Verified  Allergy, Unknown, HIGH FEVER, TALKING     OUT OF HEAD, 4/12/18)           BUPRENORPHINE (Verified  Adverse Reaction, Unknown, PT TAKES BUPRENORPHINE     REGULARLY, 4/12/18)       TAKES THE  FORM WITHOUT THE MELOXONE           MORPHINE (Verified  Adverse Reaction, Unknown, MIGRAINE, RASH, 4/12/18)           NALOXONE (Verified  Adverse Reaction, Unknown, RASH, HEADACHE, 4/12/18)           PENTAZOCINE (Verified  Adverse Reaction, Unknown, UNK, 4/12/18)      Medications    Last Reconciled on 4/12/18 08:40 by ANGELICA FIELD MD      Omeprazole (Omeprazole*) 40 Mg Capsule      40 MG PO BID, #60 CAP 2 Refills         Prov: Angelica Field         1/8/18       Fluticasone Furoate (Arnuity Ellipta) 100 Mcg Blst.w.dev      1 PUFF INH RTQDAY, #1 INH 11 Refills         Prov: Angelica Field         9/19/17       Albuterol/Ipratropium (Duoneb) 3 Ml Ampul.neb      3 ML INH Q4H Y for SHORTNESS OF BREATH, #120 NEB 6 Refills         Prov: Angelica Field         4/17/17       Demetrius-Mometasone (Nasonex*) 17 Gm Naspr      2 PUFFS NARE EACH QDAY, #1 BOTTLE 6 Refills         Prov: Angelica Field         12/29/16       Nebulizer/Compressor (Nebulizer) 1 Each Each      1 EACH XX ONCE, #1 EACH 0 Refills         Prov: Angelica Field         12/21/16       Buprenorphine HCl (Buprenorphine*) 8 Mg Tab.subl      24 MG SL QDAY, #30 TAB.SL         Reported         4/8/16       SUMAtriptan Succinate (Imitrex) 100 Mg Tab      100 MG PO QDAY Y for MIGRAINES, TAB         Reported         4/8/16       Cetirizine HCl (Cetirizine HCl*) 10 Mg Tablet      10 MG PO QDAY, #30 TAB 0 Refills         Reported         4/8/16       Montelukast Sodium (Singulair*) 10 Mg Tab      10 MG PO QDAY, #30 TAB 6 Refills         Prov: Angelica Field         12/1/15       Desvenlafaxine Succinate (Pristiq) 100 Mg Tab.sr.24h      100 MG PO QDAY, TAB.SR         Reported         12/1/15       tiZANidine HCl (tiZANidine HCl) 4 Mg Tab      4 MG PO TID Y for MUSCLE SPASMS, TAB         Reported         12/1/15       Omeprazole (Omeprazole*) 20 Mg Capcr      20 MG PO QDAY, #60 CAP 0 Refills         Reported         5/27/15       Amitriptyline HCl (Amitriptyline HCl)  50 Mg Tablet      50 MG PO HS, #30 TAB         Reported         11/24/14       rOPINIRole HCl (Requip) 2 Mg Tab      2 MG PO HS, TAB         Reported         7/14/14      Current Medications      Current Medications Reviewed 4/12/18            EXAM      GEN-patient appears stated age resting comfortable in no acute distress      Eyes-PERRL,  conjunctiva are normal in appearance extraocular muscles are intact    , no scleral icterus      Lymphatic-no swollen or enlarged cervical nodes, or axillary node, or femoral     nodes, or supraclavicular nodes      Mouth normal dentition, no erythema no ulcerations oropharynx appears normal no     exudate no evidence of postnasal drip      Neck-there are no palpable supraclavicular or cervical adenopathy, thyroid is     normal in appearance no apparent nodules, there is no inspiratory or expiratory     stridor      Respiratory-patient exhibits normal work of breathing, speaking in full     sentences without difficulty, the chest is normal in appearance, clear to     auscultation with no wheezes rales or rhonchi, chest is normal to percussion on     both the right and left sides      Cardiovascular-the heart rate is normal and regular S1 and S2 present with no     murmur or extra heart sounds, there is no JVD or pedal edema present      GI-the abdomen is normal in appearance, bowel sounds present and normal in all     quadrants no hepatosplenomegaly or masses felt      Extremities-no clubbing is present, pulses present in all extremities,     capillary refill time is normal      Skin-skin is normal in appearance it is warm and dry, no rashes present, no     evidence of cyanosis, palpation reveals no masses      Neurological-the patient is alert and oriented to time place and person, moves     all 4 extremities, normal gait, normal affect and mood, CN2-12 intact      Psych-normal judgment and insight is good, normal mood and affect, alert and     oriented to person, place, and  time, and date      Vtials      Vitals:             Height 5 ft 1.00 in / 154.94 cm           Weight 164 lbs 4.000 oz / 74.657626 kg           BSA 1.82 m2           BMI 31.0 kg/m2           Temperature 97.7 F / 36.5 C - Oral           Pulse 99           Respirations 14           Blood Pressure 111/70 Sitting, Right Arm           Pulse Oximetry 97%, Room air            REVIEW      Results Reviewed      P[facility] Results Reviewed?:  Yes Prev Lab Results, Yes Prev Radiology Results, Yes     Previous [facility] Records            PLAN      Assessment      Sarcoidosis - D86.9            Notes      New Medications      * Montelukast Sodium (Singulair*) 10 MG TAB: 10 MG PO HS #30      * Demetrius-Fluticasone (Fluticasone 50 mcg) 16 GM SPRAY.SUSP: 2 PUFFS NARE EACH QDAY     #1      * Cetirizine/Pseudoephedrine (ZyrTEC-D*) 1 EACH TAB.ER.12H: 1 TAB PO BID 30     Days #60      * Beclomethasone Dipropionate (QVAR 80 Mcg) 8.7 GM AER.W.ADAP: 1 PUFFS INH BID #    1      * predniSONE* (Deltasone*) 10 MG TABLET: 10 MG PO QDAY #30       Instructions: take 20mg for 7 days  then take 10mg for 7 days then 5 mg for 7     days      Discontinued Medications      * Albuterol/Ipratropium (Duoneb) 3 ML AMPUL.NEB: 3 ML INH Q4H PRN SHORTNESS OF     BREATH #120      New Diagnostics      * Chest W/O Cont High Resolution, SCHEDULED PROCEDURE       Dx: Sarcoidosis - D86.9      * PFT-Comp, PrePost,DLCO,BodyBox, Week       Dx: Sarcoidosis - D86.9      * Immunoglobulin  E (I, Week       Dx: Sarcoidosis - D86.9      * Rast Aspergillus Fum IGE, Routine       Dx: Sarcoidosis - D86.9      * Immuno A (Iga), Routine       Dx: Sarcoidosis - D86.9      * Immuno G (Igg), Routine       Dx: Sarcoidosis - D86.9      * IgG SUBCLASSES 1-4  IGGSC, Routine       Dx: Sarcoidosis - D86.9      * Immuno M (Igm), Routine       Dx: Sarcoidosis - D86.9      * Hypersensitivity Pneumon Panel, As Soon As Possible       Dx: Sarcoidosis - D86.9      * CMP Comp Metabolic Panel, Month       Dx:  Sarcoidosis - D86.9      * CBC, Month       Dx: Sarcoidosis - D86.9      ASSESSMENT/PLAN:      1. Repeat and recurrent upper respiratory infections. This is most likely     allergy driven versus recent reactive airway from recent influenza infection.     Obtain IgE level, aspergillus RAST testing, IgA, IgG and IgM. Obtain IgG     subclasses. Check hypersensitivity pneumonitis panel.       2. Sarcoidosis. Given the patient's recurrence of her symptoms and worsening of     her upper respiratory symptoms were obtain CT scan of the chest as well as     pulmonary function tests to make sure the patient's sarcoid is not active at     this time though I doubt it is.       3. We will also start the patient on Qvar.       4. For the patient's allergic rhinitis we will start her on Flonase and     Singulair.       5. Breast reduction. The patient request to have breast reduction surgery but     there was some trepidation about putting her to sleep with her history of     sarcoidosis. At this time the patient does not appear to have any active     sarcoidosis and would okay from a pulmonary standpoint to proceed with general     anesthesia if needed for breast reduction surgery.        6. I have personally reviewed laboratory data, imaging as well as previous     medical records.            Patient Education      Education resources provided:  Yes      Patient Education Provided:  Sarcoidosis            Patient Education:        Sarcoidosis                 Disclaimer: Converted document may not contain table formatting or lab diagrams. Please see [facility] for the authenticated document.

## 2021-05-28 NOTE — PROGRESS NOTES
Patient: YAMILA FREEMAN     Acct: [id number]     Report: #[id number]  UNIT #: [id number]     : 1970    Encounter Date:2020  PRIMARY CARE: ARASH MARROQUIN  ***Signed***  --------------------------------------------------------------------------------------------------------------------  Chief Complaint      Encounter Date      Aug 5, 2020            Primary Care Provider      ARASH MARROQUIN            Patient Complaint      Patient is complaining of      PT here today for F/U, Asthma            VITALS      Height 61 in / 154.94 cm      Weight 160 lbs  / 72.193072 kg      BSA 1.72 m2      BMI 30.2 kg/m2      Temperature 98.0 F / 36.67 C - Temporal      Pulse 84      Respirations 15      Blood Pressure 117/60 Sitting, Left Arm      Pulse Oximetry 97%, room air            HPI      The patient is a 49 year old female, patient of Dr. Field's with a history of     sarcoidosis and asthma with eosinophilia. The patient presents for follow up     visit today.  The patient states that she has taken fasenra injections and her     symptoms have improved. The patient states she did have to take a prednisone     burst a few weeks ago, however she is feeling much better since finishing     prednisone burst. The patient states she will get short of air that is worse     with exertion, moderate in severity and improved with rest.  The patient     currently denies any cough, wheezing, fever, chills, night sweats, hemoptysis,     purulent sputum production, swollen glands in the head and neck, chest pain,     chest tightness, abdominal pain, nausea, vomiting, diarrhea.  The patient denies    any headaches, myalgias, changes in sense of taste and/or smell or any other     coronavirus or flu-like symptoms.  The patient states she is taking Symbicort     and Singulair everyday as prescribed and uses albuterol inhaler and DuoNebs as     needed. The patient states she is able to perform all of her ADLs without      difficulty. The patient states she has not had to take any antibiotics since     last visit and denies any hospitalizations since last visit.            I have personally reviewed the review of systems, past family, social, surgical     and medical histories and I agree with those as entered in the chart.      Copies To:   Victor M Field      Constitutional:  Denies: Fatigue, Fever, Weight gain, Weight loss, Chills,     Insomnia, Other      Respiratory/Breathing:  Complains of: Shortness of air, Cough; Denies: Wheezing,    Hemoptysis, Pleuritic pain, Other      Endocrine:  Denies: Polydipsia, Polyuria, Heat/cold intolerance, Abnorml     menstrual pattern, Diabetes, Other      Eyes:  Denies: Blurred vision, Vision Changes, Other      Ears, nose, mouth, throat:  Denies: Mouth lesions, Thrush, Throat pain,     Hoarseness, Allergies/Hay Fever, Post Nasal Drip, Headaches, Recent Head Injury,    Nose Bleeding, Neck Stiffness, Thyroid Mass, Hearing Loss, Ear Fullness, Dry     Mouth, Nasal or Sinus Pain, Dry Lips, Nasal discharge, Nasal congestion, Other      Cardiovascular:  Denies: Palpitations, Syncope, Claudication, Chest Pain, Wake     up Gasping for air, Leg Swelling, Irregular Heart Rate, Cyanosis, Dyspnea on     Exertion, Other      Gastrointestinal:  Denies: Nausea, Constipation, Diarrhea, Abdominal pain,     Vomiting, Difficulty Swallowing, Reflux/Heartburn, Dysphagia, Jaundice,     Bloating, Melena, Bloody stools, Other      Genitourinary:  Denies: Urinary frequency, Incontinence, Hematuria, Urgency,     Nocturia, Dysuria, Testicular problems, Other      Musculoskeletal:  Denies: Joint Pain, Joint Stiffness, Joint Swelling, Myalgias,    Other      Hematologic/lymphatic:  DENIES: Lymphadenopathy, Bruising, Bleeding tendencies,     Other      Neurological:  Denies: Headache, Numbness, Weakness, Seizures, Other      Psychiatric:  Denies: Anxiety, Appropriate Effect, Depression, Other      Sleep:   No: Excessive daytime sleep, Morning Headache?, Snoring, Insomnia?, Stop    breathing at sleep?, Other      Integumentary:  Denies: Rash, Dry skin, Skin Warm to Touch, Other      Immunologic/Allergic:  Denies: Latex allergy, Seasonal allergies, Asthma,     Urticaria, Eczema, Other      Immunization status:  No: Up to date            FAMILY/SOCIAL/MEDICAL HX      Surgical History:  Yes: Appendectomy (2000), Bowel Surgery (COLONOSCOPY),     Cholecystectomy (LAP -2004), Oral Surgery (TONSILLECTOMY), Orthopedic Surgery     (BACK L4 AND L5, OTHER BACK SX, ROTATOR CUFF TO RT SHAVED), Throat Surgery     (TONSILECTOMY 2004)      Diabetes - Family Hx:  Grandparent      Cancer/Type - Family Hx:  Mother      Other Family Medical History:  Grandparent      Is Father Still Living?:  Yes      Is Mother Still Living?:  Yes      Social History:  No Tobacco Use, No Alcohol Use, No Recreational Drug use      Smoking status:  Never smoker      Occupation:  disabled      Hysterectomy:  Yes      Anticoagulation Therapy:  No      Antibiotic Prophylaxis:  No      Medical History:  Yes: Asthma (INHALERS), Chemotherapy/Cancer (MELANOMA RT LEG     REMOVED), Depression (found out mother has cancer), Anxiety, Seizures (possible     in the past), Hemorrhoids/Rectal Prob (ABNORAML CT SCAN), Reflux Disease,     Shortness Of Breath (SARCOIDOSIS), Stroke (unknown if this is definite or if     something else ); No: Arthritis, Blood Disease, Congestive Heart Failu, Deafness    or Ringing Ears, Diabetes, Heart Attack, High Blood Pressure, Sinus Trouble,     Miscellaneous Medical/oth      Psychiatric History      anxiety and depression            PREVENTION      Hx Influenza Vaccination:  Yes      Date Influenza Vaccine Given:  Oct 31, 2019      Influenza Vaccine Declined:  No      2 or More Falls in Past Year?:  No      Fall Past Year with Injury?:  No      Hx Pneumococcal Vaccination:  Yes      Encouraged to follow-up with:  PCP regarding  preventative exams.      Chart initiated by      Rosie No [facility]            ALLERGIES/MEDICATIONS      Allergies:        Coded Allergies:             TETANUS TOXOID, ADSORBED (Verified  Allergy, Unknown, HIGH FEVER, TALKING     OUT OF HEAD, 3/3/20)           BUPRENORPHINE (Verified  Adverse Reaction, Unknown, PT TAKES BUPRENORPHINE     REGULARLY, 3/3/20)                  TAKES THE FORM WITHOUT THE MELOXONE           MORPHINE (Verified  Adverse Reaction, Unknown, MIGRAINE, RASH, 3/3/20)           NALOXONE (Verified  Adverse Reaction, Unknown, RASH, HEADACHE, 3/3/20)           PENTAZOCINE (Verified  Adverse Reaction, Unknown, UNK, 3/3/20)      Medications    Last Reconciled on 8/5/20 15:32 by MAEVE [place],       Benralizumab Pen (Fasenra Pen) 30 Mg/1 Ml Auto.injct      30 MG SUBQ [id number], #1 AUTO.INJCT 6 Refills         Prov: Victor M Field         6/29/20       predniSONE (predniSONE) 20 Mg Tablet      40 MG PO QDAY for 5 Days, #10 TAB 0 Refills         Prov: Victor M Field         6/12/20       Levothyroxine (Levothyroxine) 0.05 Mg Tablet      0.05 MG PO QDAY@07, #30 TAB 0 Refills         Reported         3/16/20       Pantoprazole (Protonix) 20 Mg Tablet.dr      40 MG PO QDAY@07, #60 TAB 0 Refills         Reported         3/16/20       Albuterol (Proair HFA) 8.5 Gm Inh      2 PUFFS INH RTQ4H, #1 INH 11 Refills         Prov: Victor M Field         3/3/20       clonazePAM (clonazePAM) 1 Mg Tablet      1 MG PO BID, #60 TAB 0 Refills         Reported         10/28/19       Montelukast Sodium (Singulair*) 10 Mg Tab      10 MG PO QDAY for 30 Days, #30 TAB 3 Refills         Prov: Victor M Field         10/2/19       Linaclotide (Linzess) 145 Mcg Capsule      145 MCG PO QDAY, CAP         Reported         7/15/19       Acyclovir (Acyclovir) 800 Mg Tablet      800 MG PO QDAY, #50 TAB         Reported         7/15/19       Folic Acid (Folic Acid) 1 Mg Tablet      1 MG PO QDAY, #30 TAB 0 Refills         Reported          7/15/19       Prazosin HCl (Prazosin HCl) 2 Mg Capsule      2 MG PO HS, #90 CAP 0 Refills         Reported         7/15/19       Amitriptyline HCl (Amitriptyline HCl) 100 Mg Tablet      100 MG PO HS, #30 TAB         Reported         7/15/19       lamoTRIgine (lamoTRIgine) 200 Mg Tablet      200 MG PO QDAY, #30 TAB 0 Refills         Reported         7/15/19       Ondansetron Odt (ONDANSETRON ODT) 4 Mg Tab.rapdis      4 MG PO Q8H PRN for NAUSEA, #25 TAB.RAPDIS 2 Refills         Prov: Linda Sandhu PA-C         3/4/19       Albuterol (Proair HFA) 8.5 Gm Inh      1 PUFFS INH RTQ4H for 30 Days, #1 INH 3 Refills         Prov: Victor M Field         6/27/18       Omeprazole (Omeprazole*) 40 Mg Capsule      40 MG PO BID, #60 CAP 2 Refills         Prov: Victor M Field         1/8/18       Albuterol/Ipratropium (Duoneb) 3 Ml Ampul.neb      3 ML INH Q4H PRN for SHORTNESS OF BREATH, #120 NEB 6 Refills         Prov: Victor M Field         4/17/17       Buprenorphine Hcl (BUPRENORPHINE HCL) 8 Mg Tab.subl      24 MG SL QDAY, #30 TAB.SL         Reported         4/8/16       SUMAtriptan Succinate (Imitrex) 100 Mg Tab      100 MG PO QDAY PRN for MIGRAINES, TAB         Reported         4/8/16       rOPINIRole HCl (rOPINIRole HCl) 2 Mg Tab      2 MG PO HS, TAB         Reported         7/14/14      Current Medications      Current Medications Reviewed 8/5/20            EXAM      Vital Signs Reviewed.      General:  WDWN, Alert, NAD.      HEENT: PERRL, EOMI.  OP, nares clear, no sinus tenderness.      Neck: Supple, no JVD, no thyromegaly.      Lymph: No axillary, cervical, supraclavicular lymphadenopathy noted bilaterally.      Chest: Lungs clear to auscultation bilaterally, no wheezes, rales or rhonchi,     normal work of breathing noted, patient able to speak full sentences without     difficulty.       CV: RRR, no MGR, pulses 2+, equal.        Abd: Soft, NT, ND, +BS, no HSM.      EXT: No clubbing, no cyanosis, no edema, no  joint tenderness.        Neuro:  A  Skin: No rashes or lesions.      Vtials      Vitals:             Height 61 in / 154.94 cm           Weight 160 lbs  / 72.195737 kg           BSA 1.72 m2           BMI 30.2 kg/m2           Temperature 98.0 F / 36.67 C - Temporal           Pulse 84           Respirations 15           Blood Pressure 117/60 Sitting, Left Arm           Pulse Oximetry 97%, room air            REVIEW      Results Reviewed      PCCS Results Reviewed?:  Yes Prev Lab Results, Yes Prev Radiology Results, Yes     Previous [facility] Records      Lab Results      I reviewed Dr. Field's last TeleHealth visit note.            Assessment      Notes      Discontinued Medications      * BENRALIZUMAB PEN (Fasenra Pen) 30 MG/1 ML AUTO.INJCT: 30 MG SUBQ [id number] #1      * BENRALIZUMAB PEN (Fasenra Pen) 30 MG/1 ML AUTO.INJCT: 30 MG SUBQ [id number] #1      ASSESSMENT:       1. Severe persistent asthma with eosinophilia.      2. Sarcoidosis.      3.  Never smoker.            PLAN:      1.  The patient to continue fasenra injections as prescribed.      2. The patient to continue Symbicort everyday as prescribed and rinse her mouth     after each use.      3.  The patient to continue albuterol inhaler and DuoNebs as needed.      4. The patient to continue Singulair everyday as prescribed.      5. Sarcoidosis. The patient does not appear to have a sarcoid flare at this     time, we will continue to monitor.      6. The patient is up to date on flu and pneumonia vaccines.  The patient is     advised to get the new flu vaccine when it comes out in 08/2020.  Patient     verbalized understanding and compliance.        7.  Patient is advised to call the office, 911 or go to the ER with any new or     worsening symptoms.      8. Follow up with Dr. Field in 3-4 months, sooner if needed.            Patient Education      Patient Education Provided:  Acute Asthma      Time Spent:  > 50% /Coord Care            Electronically  signed by MAEVE NY [facility]  08/06/2020 16:49       Disclaimer: Converted document may not contain table formatting or lab diagrams. Please see [employer] for the authenticated document.

## 2021-05-28 NOTE — PROGRESS NOTES
Patient: YAMILA FREEMAN     Acct: [id number]     Report: #[id number]  UNIT #: [id number]     : 1970    Encounter Date:2020  PRIMARY CARE: ARASH MARROQUIN  ***Signed***  --------------------------------------------------------------------------------------------------------------------  History of Present Illness            Chief Complaint: 3-4 month follow up/Asthma, Sarcoidosis            Yamila Freeman is presenting for evaluation via Telehealth visit. Verbal     consent obtained before beginning visit.            PAST MEDICAL HISTORY/OVERVIEW OF PATIENT SYMPTOMS            Date of Onset: ()            Symptoms: SOA, wheezing, Cough            Flu Vaccine: Current            Pneumonia Vaccine: Current            Never Smoker            Quality of Symptoms:             Anything make it worse or better: ()            Severity of Symptoms: ()            Any known Exposure to COVID-19: Pt has been exposed, works at [employer], Pt has been tested, Negative results            Provider spent 11 minutes with the patient during telehealth visit.            The following staff were present during this visit: Bernice Márquez MA, Victor M Field DO            The patient is a very pleasant 50 year old female who is very well known to me     here today for a phone visit.  The patient had an URI approximately one week     ago, tested negative for COVID19, but is having some diarrhea today as well.      She denies any fevers or chills. No loss of smell or taste.  She is having a     mild increased shortness of breath with some wheezing. She feels like she may     need a dose of steroids and her asthma could have flared. The patient had been     doing very well on fasenra up until now and has not had any flares and has not     been sick since being on the injections.            Physical exam deferred due to TeleHealth visit.               Allergies and Medications      Allergies:        Coded  Allergies:             TETANUS TOXOID, ADSORBED (Verified  Allergy, Unknown, HIGH FEVER, TALKING     OUT OF HEAD, 12/2/20)           BUPRENORPHINE (Verified  Adverse Reaction, Unknown, PT TAKES BUPRENORPHINE     REGULARLY, 12/2/20)                  TAKES THE FORM WITHOUT THE MELOXONE           MORPHINE (Verified  Adverse Reaction, Unknown, MIGRAINE, RASH, 12/2/20)           NALOXONE (Verified  Adverse Reaction, Unknown, RASH, HEADACHE, 12/2/20)           PENTAZOCINE (Verified  Adverse Reaction, Unknown, UNK, 12/2/20)      Medications    Last Reconciled on 12/2/20 12:14 by ANGELICA FIELD MD      Benralizumab Pen (Fasenra Pen) 30 Mg/1 Ml Auto.injct      30 MG SUBQ [id number], #1 AUTO.INJCT 6 Refills         Prov: Angelica Field         6/29/20       Levothyroxine (Levothyroxine) 0.05 Mg Tablet      0.05 MG PO QDAY@07, #30 TAB 0 Refills         Reported         3/16/20       Pantoprazole (Protonix) 20 Mg Tablet.dr      40 MG PO QDAY@07, #60 TAB 0 Refills         Reported         3/16/20       Albuterol (Proair HFA) 8.5 Gm Inh      2 PUFFS INH RTQ4H, #1 INH 11 Refills         Prov: Angelica Field         3/3/20       clonazePAM (clonazePAM) 1 Mg Tablet      1 MG PO BID, #60 TAB 0 Refills         Reported         10/28/19       Montelukast Sodium (Singulair*) 10 Mg Tab      10 MG PO QDAY for 30 Days, #30 TAB 3 Refills         Prov: Angelica Field         10/2/19       Linaclotide (Linzess) 145 Mcg Capsule      145 MCG PO QDAY, CAP         Reported         7/15/19       Acyclovir (Acyclovir) 800 Mg Tablet      800 MG PO QDAY, #50 TAB         Reported         7/15/19       Folic Acid (Folic Acid) 1 Mg Tablet      1 MG PO QDAY, #30 TAB 0 Refills         Reported         7/15/19       Prazosin HCl (Prazosin HCl) 2 Mg Capsule      2 MG PO HS, #90 CAP 0 Refills         Reported         7/15/19       Amitriptyline HCl (Amitriptyline HCl) 100 Mg Tablet      100 MG PO HS, #30 TAB         Reported         7/15/19        lamoTRIgine (lamoTRIgine) 200 Mg Tablet      200 MG PO QDAY, #30 TAB 0 Refills         Reported         7/15/19       Ondansetron Odt (ONDANSETRON ODT) 4 Mg Tab.rapdis      4 MG PO Q8H PRN for NAUSEA, #25 TAB.RAPDIS 2 Refills         Prov: ISMAEL GONZALEZ PA-C         3/4/19       Albuterol (Proair HFA) 8.5 Gm Inh      1 PUFFS INH RTQ4H for 30 Days, #1 INH 3 Refills         Prov: Victor M Field         6/27/18       Omeprazole (Omeprazole*) 40 Mg Capsule      40 MG PO BID, #60 CAP 2 Refills         Prov: Victor M Field         1/8/18       Albuterol/Ipratropium (Duoneb) 3 Ml Ampul.neb      3 ML INH Q4H PRN for SHORTNESS OF BREATH, #120 NEB 6 Refills         Prov: Victor M Field         4/17/17       Buprenorphine Hcl (BUPRENORPHINE HCL) 8 Mg Tab.subl      24 MG SL QDAY, #30 TAB.SL         Reported         4/8/16       SUMAtriptan Succinate (Imitrex) 100 Mg Tab      100 MG PO QDAY PRN for MIGRAINES, TAB         Reported         4/8/16       rOPINIRole HCl (rOPINIRole HCl) 2 Mg Tab      2 MG PO HS, TAB         Reported         7/14/14            Assessment      Cough R05, Shortness of Air  R06.02 (wheezing)            Plan      Instructions      ASSESSMENT:       1. Severe persistent asthma with eosinophilia.      2. Sarcoidosis.      3.  Never smoker.            PLAN:      1.  The patient to continue fasenra injections as prescribed.      2. The patient to continue Symbicort everyday as prescribed and rinse her mouth     after each use.      3.  The patient to continue albuterol inhaler and DuoNebs as needed.      4. The patient to continue Singulair everyday as prescribed.      5. Sarcoidosis. The patient does not appear to have a sarcoid flare at this     time, we will continue to monitor.      6 burst of prednisone for 7 days            * Chronic conditions reviewed and taken in consideration for today's treatment       plan.      * Plan Of Care: ()      * Patient instructed to seek medical attention  urgently for new or worsening       symptoms.      * Patient was educated/instructed on their diagnosis, treatment and medications       today.      * Recommend self monitoring. Instructions given.      * Recommend self quarantine for 14 days.      * Recommend self quarantine until without fever for 72 hours without using fever       reducing medications.      * Recommends over the counter medications for symptom management.            Electronically signed by Victor M Field  01/06/2021 10:42       Disclaimer: Converted document may not contain table formatting or lab diagrams. Please see [email or web address] for the authenticated document.

## 2021-05-28 NOTE — PROGRESS NOTES
Patient: YAMILA FREEMAN     Acct: [id number]     Report: #[id number]  UNIT #: [id number]     : 1970    Encounter Date:2020  PRIMARY CARE: EVERETT MARROQUIN  ***Signed***  --------------------------------------------------------------------------------------------------------------------  Chief Complaint      Encounter Date      Mar 3, 2020            Primary Care Provider      Everett Marroquin            Patient Complaint      Patient is complaining of      pt is here for a 4 month f/u            VITALS      Height 5 ft 1 in / 154.94 cm      Weight 166 lbs  / 75.955420 kg      BSA 1.75 m2      BMI 31.4 kg/m2      Temperature 97.8 F / 36.56 C - Oral      Pulse 86      Respirations 17      Blood Pressure 114/74 Sitting, Left Arm      Pulse Oximetry 94%, room air            HPI      The patient is a very pleasant 49 year old female with history of sarcoidosis     currently on methotrexate here for follow up today.             The patient has been on methotrexate now for over a year with no significant     improvement in her symptoms.  She still has frequent episodes of bronchitis     requiring very frequent episodes of antibiotics and steroids.  The patient has     elevated eosinophils with eosinophil anywhere from 8-10% of her peripheral blood    on numerous CBC's. I have told the patient in the past that if her symptoms do     not improve on methotrexate that I would stop this medication and start her on     treatment for severe asthma which I suspect that is what the patient has and has    had this entire time.  The patient has daily shortness of breath that is     improved with her bronchodilator therapies and when she is on rounds of     steroids. The patient has recently been sick over the course of the past 1 week.    She has had increased cough, increased shortness of breath and increased need     for the use of her bronchodilator therapies. She has numerous nocturnal     symptoms, waking up  needing to take a dose of her rescue inhaler due to her     shortness of breath. The patient denies having any fever or chills, hemoptysis,     lower extremity edema, orthopnea and no heart palpitations.            ROS      Constitutional:  Denies: Fatigue, Fever, Weight gain, Weight loss, Chills,     Insomnia, Other      Respiratory/Breathing:  Complains of: Shortness of air, Wheezing, Cough; Denies:    Hemoptysis, Pleuritic pain, Other      Endocrine:  Denies: Polydipsia, Polyuria, Heat/cold intolerance, Abnorml     menstrual pattern, Diabetes, Other      Eyes:  Denies: Blurred vision, Vision Changes, Other      Ears, nose, mouth, throat:  Denies: Mouth lesions, Thrush, Throat pain,     Hoarseness, Allergies/Hay Fever, Post Nasal Drip, Headaches, Recent Head Injury,    Nose Bleeding, Neck Stiffness, Thyroid Mass, Hearing Loss, Ear Fullness, Dry     Mouth, Nasal or Sinus Pain, Dry Lips, Nasal discharge, Nasal congestion, Other      Cardiovascular:  Denies: Palpitations, Syncope, Claudication, Chest Pain, Wake     up Gasping for air, Leg Swelling, Irregular Heart Rate, Cyanosis, Dyspnea on     Exertion, Other      Gastrointestinal:  Complains of: Vomiting; Denies: Nausea, Constipation,     Diarrhea, Abdominal pain, Difficulty Swallowing, Reflux/Heartburn, Dysphagia,     Jaundice, Bloating, Melena, Bloody stools, Other      Genitourinary:  Denies: Urinary frequency, Incontinence, Hematuria, Urgency,     Nocturia, Dysuria, Testicular problems, Other      Musculoskeletal:  Denies: Joint Pain, Joint Stiffness, Joint Swelling, Myalgias,    Other      Hematologic/lymphatic:  DENIES: Lymphadenopathy, Bruising, Bleeding tendencies,     Other      Neurological:  Denies: Headache, Numbness, Weakness, Seizures, Other      Psychiatric:  Denies: Anxiety, Appropriate Effect, Depression, Other      Sleep:  Yes: Excessive daytime sleep, Morning Headache?, Insomnia?; No: Snoring,    Stop breathing at sleep?, Other       Integumentary:  Denies: Rash, Dry skin, Skin Warm to Touch, Other      Immunologic/Allergic:  Denies: Latex allergy, Seasonal allergies, Asthma,     Urticaria, Eczema, Other      Immunization status:  No: Up to date            FAMILY/SOCIAL/MEDICAL HX      Surgical History:  Yes: Appendectomy (2000), Bowel Surgery (COLONOSCOPY),     Cholecystectomy (2004), Oral Surgery (TONSILLECTOMY), Orthopedic Surgery (BACK     L4 AND L5, OTHER BACK SX, ROTATOR CUFF TO RT SHAVED), Throat Surgery     (TONSILECTOMY 2004)      Diabetes - Family Hx:  Grandparent      Cancer/Type - Family Hx:  Mother      Other Family Medical History:  Grandparent      Is Father Still Living?:  Yes      Is Mother Still Living?:  Yes      Social History:  No Tobacco Use, No Alcohol Use, No Recreational Drug use      Smoking status:  Never smoker      Occupation:  disabled      Hysterectomy:  Yes      Anticoagulation Therapy:  No      Antibiotic Prophylaxis:  No      Medical History:  Yes: Asthma (INHALERS), Chemotherapy/Cancer (MELANOMA RT LEG     REMOVED), Congestive Heart Failu (?), Depression (found out mother has cancer),     Anxiety, Seizures (possible in the past), Hemorrhoids/Rectal Prob (ABNORAML CT     SCAN), High Blood Pressure (AT TIMES), Reflux Disease, Shortness Of Breath     (SARCOIDOSIS), Stroke (unknown if this is definite or if something else ); No:     Arthritis, Blood Disease, Deafness or Ringing Ears, Diabetes, Sinus Trouble,     Miscellaneous Medical/oth      Psychiatric History      depression and anxiety            PREVENTION      Hx Influenza Vaccination:  Yes      Date Influenza Vaccine Given:  Oct 31, 2019      Influenza Vaccine Declined:  No      2 or More Falls Past Year?:  No      Fall Past Year with Injury?:  No      Hx Pneumococcal Vaccination:  Yes      Encouraged to follow-up with:  PCP regarding preventative exams.      Chart initiated by      Nicky Harper ma            ALLERGIES/MEDICATIONS      Allergies:         Coded Allergies:             TETANUS TOXOID, ADSORBED (Verified  Allergy, Unknown, HIGH FEVER, TALKING     OUT OF HEAD, 3/3/20)           BUPRENORPHINE (Verified  Adverse Reaction, Unknown, PT TAKES BUPRENORPHINE     REGULARLY, 3/3/20)                  TAKES THE FORM WITHOUT THE MELOXONE           MORPHINE (Verified  Adverse Reaction, Unknown, MIGRAINE, RASH, 3/3/20)           NALOXONE (Verified  Adverse Reaction, Unknown, RASH, HEADACHE, 3/3/20)           PENTAZOCINE (Verified  Adverse Reaction, Unknown, UNK, 3/3/20)      Medications    Last Reconciled on 3/3/20 08:25 by VICTOR M FIELD MD      Levothyroxine (Synthroid) Unknown Strength Mg      PO QDAY@07, #15 TAB 0 Refills         Reported         3/3/20       Umeclidinium Bromide (Incruse Ellipta) 62.5 Mcg Blst.w.dev      1 PUFF INH RTQDAY for 30 Days, #1 MDI 3 Refills         Prov: Linda Sandhu PA-C         10/29/19       Fluticasone/Vilanterol 200-25 Mcg Inh (Breo Ellipta 200-25 Mcg Inh) 1 Each     Blst.w.dev      1 PUFF INH QDAY for 30 Days, #1 INH 3 Refills         Prov: Linda Sandhu PA-C         10/29/19       Methotrexate Sodium (Methotrexate) 2.5 Mg Tab      10 MG PO Mo for 90 Days, #48 TAB 4 Refills         Prov: Linda Sandhu PA-C         10/28/19       predniSONE (predniSONE) 20 Mg Tablet      40 MG PO QDAY for 7 Days, #14 TAB 0 Refills         Prov: Linda Sandhu PA-C         10/28/19       Cefdinir (CEFDINIR) 300 Mg Cap      300 MG PO BID for 7 Days, #14 CAP         Prov: Linda Sandhu PA-C         10/28/19       clonazePAM (clonazePAM) 1 Mg Tablet      1 MG PO BID, #60 TAB 0 Refills         Reported         10/28/19       Montelukast Sodium (Singulair*) 10 Mg Tab      10 MG PO QDAY for 30 Days, #30 TAB 3 Refills         Prov: Victor M Field         10/2/19       Linaclotide (Linzess) 145 Mcg Capsule      145 MCG PO QDAY, CAP         Reported         7/15/19       Acyclovir (Acyclovir) 800 Mg Tablet      800 MG PO QDAY, #50 TAB          Reported         7/15/19       Folic Acid (Folic Acid) 1 Mg Tablet      1 MG PO QDAY, #30 TAB 0 Refills         Reported         7/15/19       Prazosin HCl (Prazosin HCl) 2 Mg Capsule      2 MG PO HS, #90 CAP 0 Refills         Reported         7/15/19       Amitriptyline HCl (Amitriptyline HCl) 100 Mg Tablet      100 MG PO HS, #30 TAB         Reported         7/15/19       lamoTRIgine (lamoTRIgine) 200 Mg Tablet      200 MG PO QDAY, #30 TAB 0 Refills         Reported         7/15/19       Ondansetron Odt (ONDANSETRON ODT) 4 Mg Tab.rapdis      4 MG PO Q8H PRN for NAUSEA, #25 TAB.RAPDIS 2 Refills         Prov: Linda Sandhu PA-C         3/4/19       Albuterol (Proair HFA) 8.5 Gm Inh      1 PUFFS INH RTQ4H for 30 Days, #1 INH 3 Refills         Prov: Victor M Field         6/27/18       Omeprazole (Omeprazole*) 40 Mg Capsule      40 MG PO BID, #60 CAP 2 Refills         Prov: Victor M Field         1/8/18       Albuterol/Ipratropium (Duoneb) 3 Ml Ampul.neb      3 ML INH Q4H PRN for SHORTNESS OF BREATH, #120 NEB 6 Refills         Prov: Victor M Field         4/17/17       Buprenorphine Hcl (BUPRENORPHINE HCL) 8 Mg Tab.subl      24 MG SL QDAY, #30 TAB.SL         Reported         4/8/16       SUMAtriptan Succinate (Imitrex) 100 Mg Tab      100 MG PO QDAY PRN for MIGRAINES, TAB         Reported         4/8/16       Desvenlafaxine Succinate (Pristiq) 100 Mg Tab.sr.24h      100 MG PO QDAY, TAB.SR         Reported         12/1/15       rOPINIRole HCl (rOPINIRole HCl) 2 Mg Tab      2 MG PO HS, TAB         Reported         7/14/14      Current Medications      Current Medications Reviewed 3/3/20            EXAM      Vital Signs Reviewed      Gen: WDWN, Alert, NAD.        HEENT:  PERRL, EOMI.  OP, nares clear, no sinus tenderness.      Neck:  Supple, no JVD, no thyromegaly.      Lymph: No axillary, cervical, supraclavicular lymphadenopathy noted bilaterally.      Chest: The patient is speaking full sentences without  difficulty, auscultation r    eveals scattered expiratory wheezes throughout all lung fields with occasional     rhonchi, no rales present, chest normal to percussion.      CV:  RRR, no MGR, pulses 2+, equal.      Abd:  Soft, NT, ND, + BS, no HSM.      EXT:  No clubbing, no cyanosis, no edema, no joint tenderness.       Neuro:  A  Skin: No rashes or lesions.      Vtials      Vitals:             Height 5 ft 1 in / 154.94 cm           Weight 166 lbs  / 75.436712 kg           BSA 1.75 m2           BMI 31.4 kg/m2           Temperature 97.8 F / 36.56 C - Oral           Pulse 86           Respirations 17           Blood Pressure 114/74 Sitting, Left Arm           Pulse Oximetry 94%, room air            REVIEW      Results Reviewed      P[facility] Results Reviewed?:  Yes Prev Lab Results, Yes Prev Radiology Results, Yes     Previous [facility] Records            Assessment      Severe asthma - J45.909            Notes      New Medications      * Levothyroxine (Synthroid) Unknown Strength MG: PO QDAY@07 #15      * predniSONE 20 MG TABLET: 40 MG PO QDAY #14      * ALBUTEROL (Proair HFA) 8.5 GM INH: 2 PUFFS INH RTQ4H #1      Discontinued Medications      * Methotrexate Sodium (Methotrexate) 2.5 MG TAB: 10 MG PO Mo 90 Days #48      New Diagnostics      * Immunoglobulin  E (I, Week         Dx: Severe asthma - J45.909      * CBC, Month         Dx: Severe asthma - J45.909      New Office Procedures      * Specialty Drug FASENRA, Routine         Dx: Severe asthma - J45.909      ASSESSMENT/PLAN:       1. Acute asthma exacerbation. We will burst the patient with prednisone today.     Continue breo, Singulair, albuterol and incruse.       2. Severe uncontrolled asthma. Continue the above mentioned bronchodilator     therapies. We will try to set the patient up with fasenra given her significant     elevated peripheral eosinophilia.       3. Peripheral eosinophilia. We will repeat blood counts today however the     patient is on steroids so  this may be falsely low. I think given the patient's     severe asthma she would benefit from having fasenra injections.       4. Sarcoidosis. No evidence of active sarcoid. The patient was started on     methotrexate by her primary care provider and I continued it however I do not     feel the patient actually had active sarcoidosis and I do not feel she has     active sarcoid at this time. I feel that her symptoms are mostly likely related     to severe asthma given her pulmonary function test showing reversibility on     methacholine challenge test confirming that she does indeed have asthma. She has    significant peripheral eosinophilia which I suspect is driving her symptoms. We     will discontinue methotrexate and folic acid and proceed with the above     mentioned treatment.       5. I have personally reviewed laboratory data, imaging and previous medical     records.            Electronically signed by Victor M Field  03/24/2020 16:23       Disclaimer: Converted document may not contain table formatting or lab diagrams. Please see [facility] for the authenticated document.

## 2021-05-28 NOTE — PROGRESS NOTES
Patient: YAMILA FREEMAN     Acct: [id number]     Report: #[id number]  UNIT #: [id number]     : 1970    Encounter Date:2018  PRIMARY CARE: EVERETT MARROQUIN  ***Signed***  --------------------------------------------------------------------------------------------------------------------  Chief Complaint      Encounter Date      Dec 4, 2018            Primary Care Provider      Everett Marroquin            Referring Provider      SELF,REFERRED            Patient Complaint      Patient is complaining of      Sarcoidosis,  F/U Reschedule, Coughing            VITALS      Height 5 ft 1 in / 154.94 cm      Weight 161 lbs 3 oz / 73.251967 kg      BSA 1.72 m2      BMI 30.5 kg/m2      Temperature 97.5 F / 36.39 C - Oral      Pulse 100      Respirations 14      Blood Pressure 109/65 Sitting, Right Arm      Pulse Oximetry 100%, room air            HPI      The patient is a very pleasant 48 year old female who is a patient of Dr. Field's last seen by him on 18. She has history of sarcoidosis and     recurrent lung infections. She has been started on bronchodilators in the past     and did not have significant improvement of her dyspnea. She was started on     Trelegy ellipta at her last office visit and says she thinks it has helped a     little but she has not had significant improvement or change in symptoms. About     a month ago she had worsening dyspnea, coughing and wheezing and presented to     [facility]. She was admitted, had a chest CT scan performed and treated     for a respiratory infection with antibiotics and steroids. The patient states     that she did improve and she was also started on methotrexate and folic acid by     the provider she saw there. Unfortunately I do not have records from that visit.    On her most recent CT scan done in our system on 17 she had decreased     areas of parenchymal consolidation and mild mediastinal and hilar adenopathy     that was  improved. The patient states that she is doing much better, denies any     increased cough, wheezing or dyspnea. She feels like her breathing is much     improved, denies any purulent sputum production, hemoptysis, fevers or chills.             I have reviewed his Review of Systems medical, surgical and family history and     agree with those as entered.      Copies To:   Victor M Field      Constitutional:  Denies: Fatigue, Fever, Weight gain, Weight loss, Chills,     Insomnia, Other      Respiratory/Breathing:  Complains of: Cough; Denies: Shortness of air, Wheezing,    Hemoptysis, Pleuritic pain, Other      Endocrine:  Denies: Polydipsia, Polyuria, Heat/cold intolerance, Abnorml mens    trual pattern, Diabetes, Other      Ears, nose, mouth, throat:  Denies: Mouth lesions, Thrush, Throat pain, Hoar    seness, Allergies/Hay Fever, Post Nasal Drip, Headaches, Recent Head Injury,     Nose Bleeding, Neck Stiffness, Thyroid Mass, Hearing Loss, Ear Fullness, Dry     Mouth, Nasal or Sinus Pain, Dry Lips, Nasal discharge, Nasal congestion, Other      Cardiovascular:  Denies: Palpitations, Syncope, Claudication, Chest Pain, Wake     up Gasping for air, Leg Swelling, Irregular Heart Rate, Cyanosis, Dyspnea on     Exertion, Other      Gastrointestinal:  Denies: Nausea, Constipation, Diarrhea, Abdominal pain,     Vomiting, Difficulty Swallowing, Reflux/Heartburn, Dysphagia, Jaundice,     Bloating, Melena, Bloody stools, Other      Genitourinary:  Denies: Urinary frequency, Incontinence, Hematuria, Urgency,     Nocturia, Dysuria, Testicular problems, Other      Musculoskeletal:  Denies: Joint Pain, Joint Stiffness, Joint Swelling, Myalgias,    Other      Hematologic/lymphatic:  DENIES: Lymphadenopathy, Bruising, Bleeding tendencies,     Other      Neurological:  Denies: Headache, Numbness, Weakness, Seizures, Other      Psychiatric:  Denies: Anxiety, Appropriate Effect, Depression, Other      Sleep:  No:  Excessive daytime sleep, Morning Headache?, Snoring, Insomnia?, Stop    breathing at sleep?, Other      Integumentary:  Denies: Rash, Dry skin, Skin Warm to Touch, Other      Immunologic/Allergic:  Denies: Latex allergy, Seasonal allergies, Asthma,     Urticaria, Eczema, Other      Immunization status:  No: Up to date            FAMILY/SOCIAL/MEDICAL HX      Surgical History:  Yes: Appendectomy (2000), Bowel Surgery (COLONOSCOPY),     Cholecystectomy (2004), Oral Surgery (TONSILECTOMY), Orthopedic Surgery (BACK L4    AND L5, OTHER BACK SX, ROTATOR CUFF TO RT SHAVED), Throat Surgery (TONSILECTOMY     2004)      Diabetes - Family Hx:  Grandparent      Cancer/Type - Family Hx:  Mother      Other Family Medical History:  Grandparent      Is Father Still Living?:  Yes      Is Mother Still Living?:  Yes      Social History:  No Tobacco Use, No Alcohol Use, No Recreational Drug use      Smoking status:  Never smoker      Occupation:  disabled      Hysterectomy:  Yes      Anticoagulation Therapy:  No      Antibiotic Prophylaxis:  No      Medical History:  Yes: Chemotherapy/Cancer (MELANOMA RT LEG REMOVED), Congestive    Heart Failu, Depression (found out mother has cancer), Anxiety, Seizures     (possible in the past), Hemorrhoids/Rectal Prob (ABNORAML CT SCAN), High Blood     Pressure (AT TIMES), Reflux Disease, Shortness Of Breath (SARCOIDOSIS), Stroke     (unknown if this is definite or if something else ); No: Arthritis, Blood     Disease, Deafness or Ringing Ears, Diabetes, Sinus Trouble, Miscellaneous     Medical/oth            PREVENTION      Hx Influenza Vaccination:  Yes      Date Influenza Vaccine Given:  Oct 1, 2018      Influenza Vaccine Declined:  No      2 or More Falls Past Year?:  No      Fall Past Year with Injury?:  No      Hx Pneumococcal Vaccination:  Yes      Encouraged to follow-up with:  PCP regarding preventative exams.      Chart initiated by      Nicolette Victor ma            ALLERGIES/MEDICATIONS       Allergies:        Coded Allergies:             TETANUS TOXOID, ADSORBED (Verified  Allergy, Unknown, HIGH FEVER, TALKING     OUT OF HEAD, 12/4/18)           BUPRENORPHINE (Verified  Adverse Reaction, Unknown, PT TAKES BUPRENORPHINE     REGULARLY, 12/4/18)                  TAKES THE FORM WITHOUT THE MELOXONE           MORPHINE (Verified  Adverse Reaction, Unknown, MIGRAINE, RASH, 12/4/18)           NALOXONE (Verified  Adverse Reaction, Unknown, RASH, HEADACHE, 12/4/18)           PENTAZOCINE (Verified  Adverse Reaction, Unknown, UNK, 12/4/18)      Medications    Last Reconciled on 12/4/18 08:33 by RANDI FRANCISCO      Fluticasone/Umeclidin/Vilanter (Trelegy Ellipta 100-62.5-25) 1 Each Blst.w.dev      1 PUFF INH RTQDAY, #1 INH 5 Refills         Prov: Linda Sandhu PA-C         12/4/18       Folic Acid (Folic Acid*) 1 Mg Tablet      1 MG PO QDAY, #30 TAB 0 Refills         Reported         12/4/18       Methotrexate (Methotrexate) Unknown Strength Tablet      PO QSATURDAY, TAB         Reported         12/4/18       Albuterol (Proair HFA) 8.5 Gm Inh      1 PUFFS INH RTQ4H for 30 Days, #1 INH 3 Refills         Prov: Victor M Field         6/27/18       Cetirizine/Pseudoephedrine (ZyrTEC-D) 1 Each Tab.er.12h      1 TAB PO BID, #60 TAB 6 Refills         Prov: Victor M Field         6/26/18       Ondansetron HCl (Zofran ODT*) 4 Mg Tab.rapdis      4 MG PO Q8H PRN for NAUSEA, #25 TAB.RAPDIS 0 Refills         Prov: Linda Sandhu PA-C         5/25/18       Demetrius-Fluticasone (Fluticasone 50 mcg) 16 Gm Spray.susp      2 PUFFS NARE EACH QDAY, #1 BOTTLE 11 Refills         Prov: Victor M Field         4/12/18       Montelukast Sodium (Singulair*) 10 Mg Tab      10 MG PO HS, #30 TAB 11 Refills         Prov: Victor M Field         4/12/18       Omeprazole (Omeprazole*) 40 Mg Capsule      40 MG PO BID, #60 CAP 2 Refills         Prov: Victor M Field         1/8/18       Albuterol/Ipratropium (Duoneb) 3 Ml Ampul.neb      3  ML INH Q4H PRN for SHORTNESS OF BREATH, #120 NEB 6 Refills         Prov: Victor M Field         4/17/17       Demetrius-Mometasone (Nasonex*) 17 Gm Naspr      2 PUFFS NARE EACH QDAY, #1 BOTTLE 6 Refills         Prov: Victor M Field         12/29/16       Nebulizer/Compressor (Nebulizer) 1 Each Each      1 EACH XX ONCE, #1 EACH 0 Refills         Prov: Victor M Field         12/21/16       Buprenorphine Hcl (BUPRENORPHINE HCL) 8 Mg Tab.subl      24 MG SL QDAY, #30 TAB.SL         Reported         4/8/16       SUMAtriptan Succinate (Imitrex) 100 Mg Tab      100 MG PO QDAY PRN for MIGRAINES, TAB         Reported         4/8/16       Cetirizine Hcl (CETIRIZINE HCL) 10 Mg Tablet      10 MG PO QDAY, #30 TAB 0 Refills         Reported         4/8/16       Montelukast Sodium (Singulair*) 10 Mg Tab      10 MG PO QDAY, #30 TAB 6 Refills         Prov: Victor M Field         12/1/15       Desvenlafaxine Succinate (Pristiq) 100 Mg Tab.sr.24h      100 MG PO QDAY, TAB.SR         Reported         12/1/15       tiZANidine HCl (tiZANidine HCl) 4 Mg Tab      4 MG PO TID PRN for MUSCLE SPASMS, TAB         Reported         12/1/15       Omeprazole (Omeprazole*) 20 Mg Capcr      20 MG PO QDAY, #60 CAP 0 Refills         Reported         5/27/15       Amitriptyline HCl (Amitriptyline HCl) 50 Mg Tablet      50 MG PO HS, #30 TAB         Reported         11/24/14       rOPINIRole HCl (Requip) 2 Mg Tab      2 MG PO HS, TAB         Reported         7/14/14      Current Medications      Current Medications Reviewed 12/4/18            EXAM      GEN-patient appears stated age resting comfortable in no acute distress      Eyes-PERRL,  conjunctiva are normal in appearance extraocular muscles are     intact, no scleral icterus      Nasal-both nares are patent turbinates appear normal no polyps seen no nasal di    scharge or ulcerations      Ears-tympanic membranes are normal no erythema no bulging, normal to inspection      Lymphatic-no swollen or  enlarged cervical nodes, or axillary node, or femoral     nodes, or supraclavicular nodes      Mouth normal dentition, no erythema no ulcerations oropharynx appears normal no     exudate no evidence of postnasal drip, MP(default value)      Neck-there are no palpable supraclavicular or cervical adenopathy, thyroid is     normal in appearance no apparent nodules, there is no inspiratory or expiratory     stridor      Respiratory-Grossly clear to auscultation bilaterally without wheezes, rhonchi     or crackles appreciated.  Normal work of breathing noted.       Cardiovascular-the heart rate is normal and regular S1 and S2 present with no     murmur or extra heart sounds, there is no JVD or pedal edema present      GI-the abdomen is normal in appearance, bowel sounds present and normal in all     quadrants no hepatosplenomegaly or masses felt      Extremities-no clubbing is present, pulses present in all extremities, capillary    refill time is normal      Musculoskeletal-Normal strength in upper and lower extremities, inspection shows    no evidence of muscle atrophy      Skin-skin is normal in appearance it is warm and dry, no rashes present, no     evidence of cyanosis, palpation reveals no masses      Neurological-the patient is alert and oriented to time place and person, moves     all 4 extremities, normal gait, normal affect and mood, CN2-12 intact      Psych-normal judgment and insight is good, normal mood and affect, alert and     oriented to person, place, and time, and date      Vtials      Vitals:             Height 5 ft 1 in / 154.94 cm           Weight 161 lbs 3 oz / 73.033962 kg           BSA 1.72 m2           BMI 30.5 kg/m2           Temperature 97.5 F / 36.39 C - Oral           Pulse 100           Respirations 14           Blood Pressure 109/65 Sitting, Right Arm           Pulse Oximetry 100%, room air            REVIEW      Results Reviewed      [facility] Results Reviewed?:  Yes Prev Lab Results, Yes Prev  Radiology Results, Yes     [facility]            Assessment      Sarcoidosis - D86.9            Pneumonia - J18.9            Notes      New Medications      * Folic Acid (Folic Acid*) 1 MG TABLET: 1 MG PO QDAY #30      * Fluticasone/Umeclidin/Vilanter (Trelegy Ellipta 100-62.5-25) 1 EACH       BLST.W.DEV: 1 PUFF INH RTQDAY #1      * Methotrexate 2.5 MG TABLET: PO QSATURDAY      Discontinued Medications      * Fluticasone Furoate (Arnuity Ellipta) 100 MCG BLST.W.DEV: 1 PUFF INH RTQDAY #1      * Beclomethasone Dipropionate (QVAR 80 Mcg) 8.7 GM AER.W.ADAP: 1 PUFFS INH BID       #1      * predniSONE* (Deltasone*) 10 MG TABLET: 10 MG PO QDAY #30         Instructions: take 20mg for 7 days  then take 10mg for 7 days then 5 mg for 7       days      * Doxycycline Hyclate (Doxycycline Hyclate*) 100 MG CAPSULE: 100 MG PO BID #14      New Diagnostics      * Chest W/O Cont CT, Month         Dx: Sarcoidosis - D86.9      ASSESSMENT:       1.  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       2.  Sarcoidosis recently started on methotrexate at [facility].         3.  Allergic rhinitis.       4.  Dyspnea improving.             PLAN:      1. We will continue the patient on trelegy ellipta 1 puff once daily. I have     prescribed this today. Use albuterol as needed.       2. I have requested records from [facility] from her recent     hospitalization and requested her most recent chest CT scan done there along     with the disc from the CT scan. This was done about a month ago.       3. We will get a repeat chest CT scan in 6 weeks and follow up in the office     afterwards. At this time we will assess whether she should continue on     methotrexate 7.5 mg every Saturday.       4. Continue Singulair and Zyrtec D.      5. Continue follow up with Dr. Rutledge for her Hypogammaglobulinemia.      6. Up to date on flu and pneumonia vaccines.       7. Follow up with Dr. Field in 4-6 weeks after her follow up chest  CT scan is     performed. She should bring the disc from her recent hospitalization at [facility].            Patient Education      Time Spent:  > 50% /[facility]            Patient Education:        Sarcoidosis                 Disclaimer: Converted document may not contain table formatting or lab diagrams. Please see [employer] for the authenticated document.

## 2021-05-28 NOTE — PROGRESS NOTES
Patient: YAMILA FREEMAN     Acct: [id number]     Report: #[id number]  UNIT #: [id number]     : 1970    Encounter Date:10/28/2019  PRIMARY CARE: EVERETT MARROQUIN  ***Signed***  --------------------------------------------------------------------------------------------------------------------  Chief Complaint      Encounter Date      Oct 28, 2019            Primary Care Provider      Everett Marroquin            Referring Provider      SELF,REFERRED            Patient Complaint      Patient is complaining of      Pt here for 3 month follow up/Lab results/Asthma            VITALS      Height 5 ft 1 in / 154.94 cm      Weight 162 lbs 4 oz / 73.647808 kg      BSA 1.73 m2      BMI 30.7 kg/m2      Temperature 97.7 F / 36.5 C - Oral      Pulse 67      Respirations 12      Blood Pressure 133/60 Sitting, Right Arm      Pulse Oximetry 100%, room air            HPI      The patient is a very pleasant 48 year old white female patient of Dr. Field's    last seen by him in 2019. She has a history of sarcoidosis on chronic     methotrexate therapy, she takes 10 mg weekly along with folic acid. She feels     like she is getting an episode of bronchitis. She is complaining of some     increased dyspnea, cough and wheezing. She tends to have a hard time coughing up    sputum. She is not on any antibiotics or prednisone for this yet. She denies any    significant issues tolerating the methotrexate but has had long standing issues     with nausea or vomiting for which she takes Zofran for. She has had gastric and     esophageal ulcers in the past and follows with Dr. Guillermo for this. Her most     recent EGD she had a normal esophagus and duodenum. She is requesting to switch     pharmacies and wants to start getting her methotrexate from a mail order     pharmacy.            I reviewed her Review of Systems, medical, surgical and family history and agree    with those as entered.      Copies To:   Victor M Field ;             ROS      Constitutional:  Denies: Fatigue, Fever, Weight gain, Weight loss, Chills, Insom    luisa, Other      Respiratory/Breathing:  Complains of: Shortness of air, Wheezing; Denies: Cough,    Hemoptysis, Pleuritic pain, Other      Endocrine:  Denies: Polydipsia, Polyuria, Heat/cold intolerance, Abnorml     menstrual pattern, Diabetes, Other      Eyes:  Denies: Blurred vision, Vision Changes, Other      Ears, nose, mouth, throat:  Denies: Mouth lesions, Thrush, Throat pain,     Hoarseness, Allergies/Hay Fever, Post Nasal Drip, Headaches, Recent Head Injury,    Nose Bleeding, Neck Stiffness, Thyroid Mass, Hearing Loss, Ear Fullness, Dry     Mouth, Nasal or Sinus Pain, Dry Lips, Nasal discharge, Nasal congestion, Other      Cardiovascular:  Denies: Palpitations, Syncope, Claudication, Chest Pain, Wake     up Gasping for air, Leg Swelling, Irregular Heart Rate, Cyanosis, Dyspnea on     Exertion, Other      Gastrointestinal:  Complains of: Constipation, Reflux/Heartburn; Denies: Nausea,    Diarrhea, Abdominal pain, Vomiting, Difficulty Swallowing, Dysphagia, Jaundice,     Bloating, Melena, Bloody stools, Other      Genitourinary:  Complains of: Other (Pt thinks she might be getting a kidney     infection); Denies: Urinary frequency, Incontinence, Hematuria, Urgency,     Nocturia, Dysuria, Testicular problems      Musculoskeletal:  Complains of: Joint Swelling (hands); Denies: Joint Pain,     Joint Stiffness, Myalgias, Other      Hematologic/lymphatic:  COMPLAINS OF: Bruising; DENIES: Lymphadenopathy,     Bleeding tendencies, Other      Neurological:  Complains of: Numbness (Hands); Denies: Headache, Weakness,     Seizures, Other      Psychiatric:  Complains of: Anxiety, Depression; Denies: Appropriate Effect,     Other      Sleep:  No: Excessive daytime sleep, Morning Headache?, Snoring, Insomnia?, Stop    breathing at sleep?, Other      Integumentary:  Denies: Rash, Dry skin, Skin Warm to Touch, Other       Immunologic/Allergic:  Complains of: Seasonal allergies, Asthma; Denies: Latex     allergy, Urticaria, Eczema, Other      Immunization status:  Up to date            FAMILY/SOCIAL/MEDICAL HX      Surgical History:  Yes: Appendectomy (2000), Bowel Surgery (COLONOSCOPY),     Cholecystectomy (2004), Oral Surgery (TONSILLECTOMY), Orthopedic Surgery (BACK     L4 AND L5, OTHER BACK SX, ROTATOR CUFF TO RT SHAVED), Throat Surgery     (TONSILECTOMY 2004)      Diabetes - Family Hx:  Grandparent      Cancer/Type - Family Hx:  Mother      Other Family Medical History:  Grandparent      Is Father Still Living?:  Yes      Is Mother Still Living?:  Yes      Social History:  No Tobacco Use, No Alcohol Use, No Recreational Drug use      Smoking status:  Never smoker      Occupation:  disabled      Hysterectomy:  Yes      Anticoagulation Therapy:  No      Antibiotic Prophylaxis:  No      Medical History:  Yes: Asthma (INHALERS), Chemotherapy/Cancer (MELANOMA RT LEG     REMOVED), Congestive Heart Failu (?), Depression (found out mother has cancer),     Anxiety, Seizures (possible in the past), Hemorrhoids/Rectal Prob (ABNORAML CT     SCAN), High Blood Pressure (AT TIMES), Reflux Disease, Shortness Of Breath     (SARCOIDOSIS), Stroke (unknown if this is definite or if something else ); No:     Arthritis, Blood Disease, Deafness or Ringing Ears, Diabetes, Sinus Trouble,     Miscellaneous Medical/oth      Psychiatric History      Depression/Anxiety            PREVENTION      Hx Influenza Vaccination:  Yes      Date Influenza Vaccine Given:  Oct 1, 2019      Influenza Vaccine Declined:  No      2 or More Falls Past Year?:  No      Fall Past Year with Injury?:  No      Hx Pneumococcal Vaccination:  Yes      Encouraged to follow-up with:  PCP regarding preventative exams.      Chart initiated by      Bernice Márquez MA            ALLERGIES/MEDICATIONS      Allergies:        Coded Allergies:             TETANUS TOXOID, ADSORBED (Verified   Allergy, Unknown, HIGH FEVER, TALKING     OUT OF HEAD, 10/28/19)           BUPRENORPHINE (Verified  Adverse Reaction, Unknown, PT TAKES BUPRENORPHINE     REGULARLY, 10/28/19)                  TAKES THE FORM WITHOUT THE MELOXONE           MORPHINE (Verified  Adverse Reaction, Unknown, MIGRAINE, RASH, 10/28/19)           NALOXONE (Verified  Adverse Reaction, Unknown, RASH, HEADACHE, 10/28/19)           PENTAZOCINE (Verified  Adverse Reaction, Unknown, UNK, 10/28/19)      Medications    Last Reconciled on 10/28/19 16:03 by RANDI FRANCISCO      Methotrexate Sodium (Methotrexate) 2.5 Mg Tab      10 MG PO Mo for 90 Days, #48 TAB 4 Refills         Prov: Linda Sandhu PA-C         10/28/19       Fluticasone/Umeclidin/Vilanter (Trelegy Ellipta 100-62.5-25) 1 Each Blst.w.dev      1 PUFF INH RTQDAY, #1 INH 11 Refills         Prov: Linda Sandhu PA-C         10/28/19       predniSONE* (predniSONE*) 20 Mg Tablet      40 MG PO QDAY for 7 Days, #14 TAB 0 Refills         Prov: Linda Sandhu PA-C         10/28/19       Cefdinir (CEFDINIR) 300 Mg Cap      300 MG PO BID for 7 Days, #14 CAP         Prov: Linda Sandhu PA-C         10/28/19       ClonazePAM (ClonazePAM) 1 Mg Tablet      1 MG PO BID, #60 TAB 0 Refills         Reported         10/28/19       Montelukast Sodium (Singulair*) 10 Mg Tab      10 MG PO QDAY for 30 Days, #30 TAB 3 Refills         Prov: Victor M Field         10/2/19       Linaclotide (Linzess) 145 Mcg Capsule      145 MCG PO QDAY, CAP         Reported         7/15/19       Acyclovir (Acyclovir) 800 Mg Tablet      800 MG PO QDAY, #50 TAB         Reported         7/15/19       Folic Acid (Folic Acid*) 1 Mg Tablet      1 MG PO QDAY, #30 TAB 0 Refills         Reported         7/15/19       Prazosin HCl (Prazosin HCl) 2 Mg Capsule      2 MG PO HS, #90 CAP 0 Refills         Reported         7/15/19       Amitriptyline HCl (Amitriptyline HCl) 100 Mg Tablet      100 MG PO HS, #30 TAB         Reported          7/15/19       lamoTRIgine (lamoTRIgine*) 200 Mg Tablet      200 MG PO QDAY, #30 TAB 0 Refills         Reported         7/15/19       Ondansetron Odt (ONDANSETRON ODT) 4 Mg Tab.rapdis      4 MG PO Q8H PRN for NAUSEA, #25 TAB.RAPDIS 2 Refills         Prov: Linda Sandhu PA-C         3/4/19       Albuterol (Proair HFA) 8.5 Gm Inh      1 PUFFS INH RTQ4H for 30 Days, #1 INH 3 Refills         Prov: Victor M Field         6/27/18       Omeprazole (Omeprazole*) 40 Mg Capsule      40 MG PO BID, #60 CAP 2 Refills         Prov: Victor M Field         1/8/18       Albuterol/Ipratropium (Duoneb) 3 Ml Ampul.neb      3 ML INH Q4H PRN for SHORTNESS OF BREATH, #120 NEB 6 Refills         Prov: Victor M Field         4/17/17       Buprenorphine Hcl (BUPRENORPHINE HCL) 8 Mg Tab.subl      24 MG SL QDAY, #30 TAB.SL         Reported         4/8/16       SUMAtriptan Succinate (Imitrex) 100 Mg Tab      100 MG PO QDAY PRN for MIGRAINES, TAB         Reported         4/8/16       Desvenlafaxine Succinate (Pristiq) 100 Mg Tab.sr.24h      100 MG PO QDAY, TAB.SR         Reported         12/1/15       rOPINIRole HCl (rOPINIRole HCl) 2 Mg Tab      2 MG PO HS, TAB         Reported         7/14/14      Current Medications      Current Medications Reviewed 10/28/19            EXAM      GEN-patient appears stated age resting comfortable in no acute distress      Eyes-PERRL,  conjunctiva are normal in appearance extraocular muscles are     intact, no scleral icterus      Lymphatic-no swollen or enlarged cervical nodes, or axillary node, or femoral     nodes, or supraclavicular nodes      Mouth normal dentition, no erythema no ulcerations oropharynx appears normal no     exudate no evidence of postnasal drip, MP       Neck-there are no palpable supraclavicular or cervical adenopathy, thyroid is     normal in appearance no apparent nodules, there is no inspiratory or expiratory     stridor      Respiratory-Mildly decreased breath sounds  throughout, no wheezes, rhonchi or     crackles, normal work of breathing noted.        Cardiovascular-the heart rate is normal and regular S1 and S2 present with no     murmur or extra heart sounds, there is no JVD or pedal edema present      GI-the abdomen is normal in appearance, bowel sounds present and normal in all     quadrants no hepatosplenomegaly or masses felt      Extremities-no clubbing is present, pulses present in all extremities, capillary    refill time is normal      Musculoskeletal-Normal strength in upper and lower extremities, inspection shows    no evidence of muscle atrophy      Skin-skin is normal in appearance it is warm and dry, no rashes present, no     evidence of cyanosis, palpation reveals no masses      Neurological-the patient is alert and oriented to time place and person, moves     all 4 extremities, normal gait, normal affect and mood, CN2-12 intact      Psych-normal judgment and insight is good, normal mood and affect, alert and     oriented to person, place, and time, and date      Vtials      Vitals:             Height 5 ft 1 in / 154.94 cm           Weight 162 lbs 4 oz / 73.247618 kg           BSA 1.73 m2           BMI 30.7 kg/m2           Temperature 97.7 F / 36.5 C - Oral           Pulse 67           Respirations 12           Blood Pressure 133/60 Sitting, Right Arm           Pulse Oximetry 100%, room air            REVIEW      Results Reviewed      P[facility]S Results Reviewed?:  Yes Prev Lab Results, Yes Prev Radiology Results, Yes     [facility]            Assessment      Sarcoidosis - D86.9            Therapeutic drug monitoring - Z51.81            Notes      New Medications      * ClonazePAM 1 MG TABLET: 1 MG PO BID #60      * CEFDINIR 300 MG CAP: 300 MG PO BID 7 Days #14      * predniSONE* 20 MG TABLET: 40 MG PO QDAY 7 Days #14      * Methotrexate Sodium (Methotrexate) 2.5 MG TAB: 10 MG PO Mo 90 Days #48      * Fluticasone/Vilanterol 200-25 Mcg Inh (Breo Ellipta  200-25 Mcg Inh) 1 EACH       BLST.W.DEV: 1 PUFF INH QDAY 30 Days #1      * UMECLIDINIUM BROMIDE (Incruse Ellipta) 62.5 MCG BLST.W.DEV: 1 PUFF INH RTQDAY       30 Days #1      Discontinued Medications      * Methotrexate Sodium (Methotrexate) 2.5 MG TAB: 10 MG PO MO 30 Days #16      * Fluticasone/Umeclidin/Vilanter (Trelegy Ellipta 100-62.5-25) 1 EACH       BLST.W.DEV: 1 PUFF INH RTQDAY #1      New Diagnostics      * CBC, 1 DAY         Dx: Sarcoidosis - D86.9      * Comp Metabolic Panel, 1 DAY         Dx: Sarcoidosis - D86.9      ASSESSMENT:       1.  Sarcoidosis on methotrexate.      2.  Toxic drug monitoring.      3.  Persistent asthma with positive methacholine challenge.      4.  Acute bronchitis.       5.  Hypogammaglobulinemia.              PLAN:      1. We will continue current methotrexate dose of 10 mg every Monday and folic     acid.  I will check a CBC and CMP now for monitoring while she is on  this. I     have sent a 1 month supply of methotrexate to her local pharmacy as she is out     of that and sent refills in 90 day supply to her mail order pharmacy. She it to     let us know if she has any issues receiving the methotrexate.       2. Continue trelegy ellipta  inhaler.       3. I have prescribed a course of cefdinir and a short prednisone burst for acute    bronchitis. She should let us know if her symptoms worsen or do not improve.        4. She is up to date on her flu vaccine.       5. Follow up in 3-4 months with Dr. Field, sooner if needed.            Patient Education      Patient Education Provided:  How to use an Inhaler, Sarcoidosis      Time Spent:  > 50% /Coord Care            Patient Education:        Sarcoidosis            Electronically signed by ISMAEL GONZALEZ PA-C  11/01/2019 15:50       Disclaimer: Converted document may not contain table formatting or lab diagrams. Please see [email or web address] for the authenticated document.

## 2021-08-17 ENCOUNTER — TRANSCRIBE ORDERS (OUTPATIENT)
Dept: GENERAL RADIOLOGY | Facility: HOSPITAL | Age: 51
End: 2021-08-17

## 2021-08-17 ENCOUNTER — HOSPITAL ENCOUNTER (OUTPATIENT)
Dept: GENERAL RADIOLOGY | Facility: HOSPITAL | Age: 51
Discharge: HOME | End: 2021-08-17
Admitting: NURSE PRACTITIONER

## 2021-08-17 DIAGNOSIS — J22 LOWER RESPIRATORY TRACT INFECTION: ICD-10-CM

## 2021-08-17 DIAGNOSIS — J22 LOWER RESPIRATORY TRACT INFECTION: Primary | ICD-10-CM

## 2021-08-17 PROCEDURE — 71046 X-RAY EXAM CHEST 2 VIEWS: CPT

## 2021-12-22 ENCOUNTER — OFFICE VISIT (OUTPATIENT)
Dept: PULMONOLOGY | Facility: CLINIC | Age: 51
End: 2021-12-22

## 2021-12-22 VITALS
WEIGHT: 150 LBS | BODY MASS INDEX: 28.32 KG/M2 | HEIGHT: 61 IN | TEMPERATURE: 98.6 F | RESPIRATION RATE: 14 BRPM | HEART RATE: 80 BPM | OXYGEN SATURATION: 94 % | SYSTOLIC BLOOD PRESSURE: 106 MMHG | DIASTOLIC BLOOD PRESSURE: 66 MMHG

## 2021-12-22 DIAGNOSIS — J30.9 ALLERGIC SINUSITIS: Primary | ICD-10-CM

## 2021-12-22 DIAGNOSIS — J45.40 MODERATE PERSISTENT ASTHMA WITHOUT COMPLICATION: ICD-10-CM

## 2021-12-22 PROCEDURE — 99203 OFFICE O/P NEW LOW 30 MIN: CPT | Performed by: INTERNAL MEDICINE

## 2021-12-22 RX ORDER — FLUTICASONE PROPIONATE 50 MCG
2 SPRAY, SUSPENSION (ML) NASAL DAILY
Qty: 1 G | Refills: 3 | Status: SHIPPED | OUTPATIENT
Start: 2021-12-22

## 2021-12-22 RX ORDER — DIAZEPAM 10 MG/1
10 TABLET ORAL
Status: ON HOLD | COMMUNITY
End: 2023-01-20

## 2021-12-22 RX ORDER — GABAPENTIN 300 MG/1
300 CAPSULE ORAL 3 TIMES DAILY
Status: ON HOLD | COMMUNITY
End: 2023-01-20

## 2021-12-22 NOTE — PROGRESS NOTES
Pulmonary Consultation    No ref. provider found,    Thank you for asking me to see Lamar Vee for   Chief Complaint   Patient presents with   • Follow-up     3 mo f/u   • Sinus Problem   • Asthma   .      History of Present Illness  Lamar Vee is a 51 y.o. female with a PMH significant for allergic rhinitis and bronchial asthma presents for follow-up she does complain of nasal congestion and chest congestion with wheezing patient has not been compliant with her inhalers and ran out of her nasal spray she denies any chest pain fever or hemoptysis       Tobacco use history: Never smoker    Review of Systems: History obtained from chart review and the patient.  Review of Systems   HENT: Positive for congestion.    Respiratory: Positive for wheezing.    All other systems reviewed and are negative.    As described in the HPI. Otherwise, remainder of ROS (14 systems) were negative.    Patient Active Problem List   Diagnosis   • Right knee pain   • Patellar instability of right knee         Current Outpatient Medications:   •  amitriptyline (ELAVIL) 100 MG tablet, TAKE ONE TABLET BY MOUTH AT BEDTIME, Disp: , Rfl: 3  •  buprenorphine (SUBUTEX) 8 MG sublingual tablet SL tablet, Dissolve THREE tablets UNDER THE TONGUE EVERY DAY, Disp: , Rfl: 0  •  cetirizine (zyrTEC) 10 MG tablet, TAKE ONE TABLET BY MOUTH AT BEDTIME, Disp: , Rfl: 3  •  clonazePAM (KlonoPIN) 1 MG tablet, TAKE ONE TABLET BY MOUTH EVERY DAY, Disp: , Rfl: 0  •  ipratropium-albuterol (DUO-NEB) 0.5-2.5 mg/mL nebulizer, INHALE THE contents of ONE vial (3mls) EVERY 4 HOURS AS NEEDED FOR SHORTNESS OF BREATH, Disp: , Rfl: 6  •  lamoTRIgine (LaMICtal) 200 MG tablet, TAKE ONE TABLET BY MOUTH AT BEDTIME, Disp: , Rfl: 4  •  montelukast (SINGULAIR) 10 MG tablet, TAKE ONE TABLET BY MOUTH EVERY DAY, Disp: , Rfl: 1  •  omeprazole (priLOSEC) 40 MG capsule, TAKE ONE CAPSULE BY MOUTH TWICE DAILY, Disp: , Rfl: 2  •  rOPINIRole (REQUIP) 2 MG tablet, TAKE ONE TABLET  BY MOUTH AT BEDTIME, Disp: , Rfl: 5  •  tiZANidine (ZANAFLEX) 4 MG tablet, TAKE TWO TABLETS BY MOUTH THREE TIMES DAILY AS NEEDED, Disp: , Rfl: 5  •  buPROPion XL (WELLBUTRIN XL) 150 MG 24 hr tablet, TAKE ONE TABLET BY MOUTH EVERY MORNING, Disp: , Rfl: 4  •  Desvenlafaxine Succinate ER 25 MG tablet sustained-release 24 hour, TAKE TWO TABLETS EVERY MORNING, Disp: , Rfl: 1  •  diazePAM (VALIUM) 10 MG tablet, Take 10 mg by mouth., Disp: , Rfl:   •  fluticasone (Flonase) 50 MCG/ACT nasal spray, 2 sprays into the nostril(s) as directed by provider Daily., Disp: 1 g, Rfl: 3  •  gabapentin (NEURONTIN) 300 MG capsule, Take 300 mg by mouth 3 (Three) Times a Day., Disp: , Rfl:   •  ibuprofen (ADVIL,MOTRIN) 600 MG tablet, TAKE ONE TABLET BY MOUTH THREE TIMES DAILY, Disp: , Rfl: 1  •  phentermine 30 MG capsule, TAKE ONE CAPSULE BY MOUTH EVERY DAY, Disp: , Rfl: 0  •  prazosin (MINIPRESS) 2 MG capsule, TAKE TWO CAPSULES BY MOUTH AT BEDTIME, Disp: , Rfl: 4  •  PROAIR RESPICLICK 108 (90 BASE) MCG/ACT inhaler, USE ONE PUFF FOUR TIMES DAILY, Disp: , Rfl: 5    Allergies   Allergen Reactions   • Naloxone    • Tetanus Toxoids    • Toradol [Ketorolac Tromethamine]    • Tyloxapol        History reviewed. No pertinent past medical history.  Past Surgical History:   Procedure Laterality Date   • APPENDECTOMY     • BACK SURGERY      lumbar   • CHOLECYSTECTOMY     • COLONOSCOPY     • FACIAL COSMETIC SURGERY     • HYSTERECTOMY     • SHOULDER SURGERY      spur removal   • TONSILLECTOMY AND ADENOIDECTOMY     • WISDOM TOOTH EXTRACTION       Social History     Socioeconomic History   • Marital status:    Tobacco Use   • Smoking status: Never Smoker   • Smokeless tobacco: Never Used   Vaping Use   • Vaping Use: Never used   Substance and Sexual Activity   • Alcohol use: Defer   • Drug use: Defer   • Sexual activity: Defer     History reviewed. No pertinent family history.       Objective     Blood pressure 106/66, pulse 80, temperature 98.6  "°F (37 °C), resp. rate 14, height 154.9 cm (61\"), weight 68 kg (150 lb), SpO2 94 %.  Physical Exam  Vitals and nursing note reviewed.   Constitutional:       Appearance: Normal appearance.   HENT:      Head: Normocephalic and atraumatic.      Nose: Congestion present.      Mouth/Throat:      Mouth: Mucous membranes are moist.   Eyes:      Extraocular Movements: Extraocular movements intact.      Pupils: Pupils are equal, round, and reactive to light.   Cardiovascular:      Rate and Rhythm: Normal rate and regular rhythm.      Pulses: Normal pulses.      Heart sounds: Normal heart sounds.   Pulmonary:      Effort: Pulmonary effort is normal.      Breath sounds: Rhonchi present.   Abdominal:      General: Abdomen is flat. Bowel sounds are normal.      Palpations: Abdomen is soft.   Musculoskeletal:         General: Normal range of motion.      Cervical back: Normal range of motion and neck supple.   Skin:     General: Skin is warm.      Capillary Refill: Capillary refill takes less than 2 seconds.   Neurological:      General: No focal deficit present.      Mental Status: She is alert and oriented to person, place, and time.   Psychiatric:         Mood and Affect: Mood normal.         Behavior: Behavior normal.            Assessment/Plan     Diagnoses and all orders for this visit:    1. Allergic sinusitis (Primary)    2. Moderate persistent asthma without complication    Other orders  -     fluticasone (Flonase) 50 MCG/ACT nasal spray; 2 sprays into the nostril(s) as directed by provider Daily.  Dispense: 1 g; Refill: 3         Discussion/ Recommendations:   Patient is advised compliance with her inhalers and encouraged to use them we will give her a prescription of Flonase nasal spray for her nasal allergies patient is advised to continue her Fasenra shots for asthma    Patient's Body mass index is 28.34 kg/m². indicating that she is overweight (BMI 25-29.9). Obesity-related health conditions include the following: " none. Obesity is unchanged. BMI is is above average; BMI management plan is completed. We discussed low calorie, low carb based diet program, portion control and increasing exercise..           Return in about 3 months (around 3/22/2022).      Thank you for allowing me to participate in the care of Lamar TAWANNA Taylorlexx. Please do not hesitate to contact me with any questions.         This document has been electronically signed by Spencer Chan MD on December 22, 2021 10:33 EST

## 2022-01-05 ENCOUNTER — TELEPHONE (OUTPATIENT)
Dept: SURGERY | Facility: CLINIC | Age: 52
End: 2022-01-05

## 2022-01-05 NOTE — TELEPHONE ENCOUNTER
Hub staff attempted to follow warm transfer process and was unsuccessful     Caller: Lamar Vee    Relationship to patient: Self    Best call back number: [phone]    Patient is needing: PT CALLED TO RESCHEDULE APPT. HUB UNABLE TO RESCHEDULE APPT. UNABLE TO WARM TRANSFERRED TO OFFICE.

## 2022-01-12 ENCOUNTER — OFFICE VISIT (OUTPATIENT)
Dept: SURGERY | Facility: CLINIC | Age: 52
End: 2022-01-12

## 2022-01-12 VITALS — WEIGHT: 151 LBS | HEIGHT: 61 IN | BODY MASS INDEX: 28.51 KG/M2 | HEART RATE: 96 BPM | RESPIRATION RATE: 16 BRPM

## 2022-01-12 DIAGNOSIS — R19.7 DIARRHEA, UNSPECIFIED TYPE: Primary | ICD-10-CM

## 2022-01-12 DIAGNOSIS — R11.2 NAUSEA AND VOMITING, INTRACTABILITY OF VOMITING NOT SPECIFIED, UNSPECIFIED VOMITING TYPE: ICD-10-CM

## 2022-01-12 PROCEDURE — 99214 OFFICE O/P EST MOD 30 MIN: CPT | Performed by: NURSE PRACTITIONER

## 2022-01-12 RX ORDER — RIMEGEPANT SULFATE 75 MG/75MG
TABLET, ORALLY DISINTEGRATING ORAL
COMMUNITY

## 2022-01-12 RX ORDER — PANTOPRAZOLE SODIUM 40 MG/1
40 TABLET, DELAYED RELEASE ORAL DAILY
Qty: 30 TABLET | Refills: 1 | Status: SHIPPED | OUTPATIENT
Start: 2022-01-12 | End: 2022-03-11 | Stop reason: SDUPTHER

## 2022-01-12 RX ORDER — METRONIDAZOLE 500 MG/1
TABLET ORAL
Qty: 30 TABLET | Refills: 0 | Status: ON HOLD | OUTPATIENT
Start: 2022-01-12 | End: 2023-01-20

## 2022-01-14 NOTE — PROGRESS NOTES
Chief Complaint: Follow-up (vomiting, diarrhea)    Subjective      Vomiting & diarrhea       History of Present Illness  Lamar Vee is a 51 y.o. female presents to [facility] for vomiting and diarrhea.     Patient presents today with complaints of vomiting and diarrhea. Patient recently underwent an EGD and colonoscopy with Dr. Guillermo on 3/21.     Pathology as follows:    Order: [id number]   Status: Final result     Visible to patient: No (not released)     Next appt: 03/24/2022 at 10:30 AM in Pulmonology (OSMAN Isaacs)          0 Result Notes    Component   Ref Range & Units    C diff Toxin B   NA POSITIVE Abnormal     027 Toxin   NA NEGATIVE         Patient was also diagnosed with colitis and rectal ulcer per pathology.    Patient was treated with vancomycin and she reports that her diarrhea stopped but about 3 weeks ago it returned. Denies any solid stools over the last 3 weeks.     Denies any lower abdominal pain or rectal bleeding.    Denies any fever or chills.       Objective     History reviewed. No pertinent past medical history.    Past Surgical History:   Procedure Laterality Date   • APPENDECTOMY     • BACK SURGERY      lumbar   • CHOLECYSTECTOMY     • COLONOSCOPY     • FACIAL COSMETIC SURGERY     • HYSTERECTOMY     • SHOULDER SURGERY      spur removal   • TONSILLECTOMY AND ADENOIDECTOMY     • WISDOM TOOTH EXTRACTION         Outpatient Medications Marked as Taking for the 1/12/22 encounter (Office Visit) with Telly April, APRZACHARY   Medication Sig Dispense Refill   • amitriptyline (ELAVIL) 100 MG tablet TAKE ONE TABLET BY MOUTH AT BEDTIME  3   • Benralizumab (FASENRA SC) Inject  under the skin into the appropriate area as directed.     • buprenorphine (SUBUTEX) 8 MG sublingual tablet SL tablet Dissolve THREE tablets UNDER THE TONGUE EVERY DAY  0   • buPROPion XL (WELLBUTRIN XL) 150 MG 24 hr tablet TAKE ONE TABLET BY MOUTH EVERY MORNING  4   • clonazePAM  (KlonoPIN) 1 MG tablet TAKE ONE TABLET BY MOUTH EVERY DAY  0   • fluticasone (Flonase) 50 MCG/ACT nasal spray 2 sprays into the nostril(s) as directed by provider Daily. 1 g 3   • ibuprofen (ADVIL,MOTRIN) 600 MG tablet TAKE ONE TABLET BY MOUTH THREE TIMES DAILY  1   • ipratropium-albuterol (DUO-NEB) 0.5-2.5 mg/mL nebulizer INHALE THE contents of ONE vial (3mls) EVERY 4 HOURS AS NEEDED FOR SHORTNESS OF BREATH  6   • lamoTRIgine (LaMICtal) 200 MG tablet TAKE ONE TABLET BY MOUTH AT BEDTIME  4   • montelukast (SINGULAIR) 10 MG tablet TAKE ONE TABLET BY MOUTH EVERY DAY  1   • omeprazole (priLOSEC) 40 MG capsule TAKE ONE CAPSULE BY MOUTH TWICE DAILY  2   • phentermine 30 MG capsule TAKE ONE CAPSULE BY MOUTH EVERY DAY  0   • prazosin (MINIPRESS) 2 MG capsule TAKE TWO CAPSULES BY MOUTH AT BEDTIME  4   • PROAIR RESPICLICK 108 (90 BASE) MCG/ACT inhaler USE ONE PUFF FOUR TIMES DAILY  5   • Rimegepant Sulfate (Nurtec) 75 MG tablet dispersible tablet Take  by mouth.     • rOPINIRole (REQUIP) 2 MG tablet TAKE ONE TABLET BY MOUTH AT BEDTIME  5   • tiZANidine (ZANAFLEX) 4 MG tablet TAKE TWO TABLETS BY MOUTH THREE TIMES DAILY AS NEEDED  5       Allergies   Allergen Reactions   • Naloxone    • Tetanus Toxoids    • Toradol [Ketorolac Tromethamine]    • Tyloxapol         Family History   Problem Relation Age of Onset   • Cancer Mother    • Cancer Maternal Aunt    • Heart disease Maternal Aunt        Social History     Socioeconomic History   • Marital status:    Tobacco Use   • Smoking status: Never Smoker   • Smokeless tobacco: Never Used   Vaping Use   • Vaping Use: Never used   Substance and Sexual Activity   • Alcohol use: Defer   • Drug use: Defer   • Sexual activity: Defer       Review of Systems   Constitutional: Negative for chills and fever.   Gastrointestinal: Positive for diarrhea, nausea, vomiting, GERD and indigestion. Negative for abdominal distention, abdominal pain, anal bleeding, blood in stool, constipation  "and rectal pain.        Vital Signs:   Pulse 96   Resp 16   Ht 154.9 cm (61\")   Wt 68.5 kg (151 lb)   BMI 28.53 kg/m²      Physical Exam  Constitutional:       Appearance: Normal appearance.   HENT:      Head: Normocephalic.   Cardiovascular:      Rate and Rhythm: Normal rate.   Pulmonary:      Effort: Pulmonary effort is normal.   Abdominal:      General: Abdomen is flat.      Palpations: Abdomen is soft.   Skin:     General: Skin is warm and dry.   Neurological:      General: No focal deficit present.      Mental Status: She is alert and oriented to person, place, and time.   Psychiatric:         Mood and Affect: Mood normal.         Thought Content: Thought content normal.          Result Review :              []  Laboratory  []  Radiology  [x]  Pathology  []  Microbiology  []  EKG/Telemetry   []  Cardiology/Vascular   [x]  Old records   Today I have reviewed Dr. Guillermo's previous operative report and pathology.     Assessment and Plan    Diagnoses and all orders for this visit:    1. Diarrhea, unspecified type (Primary)  -     Clostridium Difficile EIA - Stool, Per Rectum    2. Nausea and vomiting, intractability of vomiting not specified, unspecified vomiting type  -     Clostridium Difficile EIA - Stool, Per Rectum    Other orders  -     pantoprazole (Protonix) 40 MG EC tablet; Take 1 tablet by mouth Daily.  Dispense: 30 tablet; Refill: 1  -     metroNIDAZOLE (Flagyl) 500 MG tablet; Take 1 tablet PO TID  X 10 days  Dispense: 30 tablet; Refill: 0        Follow Up   Return if symptoms worsen or fail to improve.     Discontinue omeprazole. Start protonix 40 mg daily. C. Diff stool study. Start flagyl 500 mg TID x 10 days.     I have discussed with the patient that her stool needs to be loose for hospital to be able to perform the test. Patient verbalized understanding. If stool study is negative and medications doesn't help, then I will refer the patient to GI specialist.     Patient was given instructions " and counseling regarding her condition or for health maintenance advice. Please see specific information pulled into the AVS if appropriate.

## 2022-03-11 RX ORDER — PANTOPRAZOLE SODIUM 40 MG/1
40 TABLET, DELAYED RELEASE ORAL DAILY
Qty: 30 TABLET | Refills: 4
Start: 2022-03-11 | End: 2022-08-11

## 2022-03-30 ENCOUNTER — TELEPHONE (OUTPATIENT)
Dept: PULMONOLOGY | Facility: CLINIC | Age: 52
End: 2022-03-30

## 2022-03-30 NOTE — TELEPHONE ENCOUNTER
Patient called about their Fasenra injection. They are needing a new order placed due to the old one running out. Could you please look into this? Thanks!

## 2022-04-01 DIAGNOSIS — J45.40 MODERATE PERSISTENT ASTHMA WITHOUT COMPLICATION: Primary | ICD-10-CM

## 2022-04-01 RX ORDER — BUDESONIDE AND FORMOTEROL FUMARATE DIHYDRATE 160; 4.5 UG/1; UG/1
2 AEROSOL RESPIRATORY (INHALATION)
Qty: 3 EACH | Refills: 4 | Status: SHIPPED | OUTPATIENT
Start: 2022-04-01 | End: 2022-04-14 | Stop reason: SDUPTHER

## 2022-04-07 ENCOUNTER — TELEPHONE (OUTPATIENT)
Dept: PULMONOLOGY | Facility: CLINIC | Age: 52
End: 2022-04-07

## 2022-04-07 NOTE — TELEPHONE ENCOUNTER
Called patient back and spoke with her. Patient states she is needing a new prescription for her Fasenra and that it is past due. Patient states she has called and spoke with someone about this before, but an inhaler was sent / prescribed instead. Can you help?

## 2022-04-07 NOTE — TELEPHONE ENCOUNTER
Patient called and left a voicemail needing someone to give her a call back, but did not leave any details to what was needed. Please call patient

## 2022-04-12 ENCOUNTER — TELEPHONE (OUTPATIENT)
Dept: PULMONOLOGY | Facility: CLINIC | Age: 52
End: 2022-04-12

## 2022-04-12 NOTE — TELEPHONE ENCOUNTER
Looks like this patient no showed her last appointment. She probably needs an appointment. I don't see that we have prescribed Fasenra since being on [facility].

## 2022-04-12 NOTE — TELEPHONE ENCOUNTER
Patient is calling stating she is trying to get a refill on her injections,DRAKEENTIMOTHY SC   . Patient states she has called multiple times and left multiple messages

## 2022-04-14 DIAGNOSIS — J45.40 MODERATE PERSISTENT ASTHMA WITHOUT COMPLICATION: ICD-10-CM

## 2022-04-14 RX ORDER — BUDESONIDE AND FORMOTEROL FUMARATE DIHYDRATE 160; 4.5 UG/1; UG/1
2 AEROSOL RESPIRATORY (INHALATION)
Qty: 3 EACH | Refills: 4 | Status: SHIPPED | OUTPATIENT
Start: 2022-04-14 | End: 2023-03-03 | Stop reason: SDUPTHER

## 2022-04-15 ENCOUNTER — OFFICE VISIT (OUTPATIENT)
Dept: PULMONOLOGY | Facility: CLINIC | Age: 52
End: 2022-04-15

## 2022-04-15 VITALS
DIASTOLIC BLOOD PRESSURE: 56 MMHG | BODY MASS INDEX: 27 KG/M2 | HEIGHT: 61 IN | OXYGEN SATURATION: 95 % | HEART RATE: 78 BPM | WEIGHT: 143 LBS | SYSTOLIC BLOOD PRESSURE: 90 MMHG | TEMPERATURE: 98 F | RESPIRATION RATE: 14 BRPM

## 2022-04-15 DIAGNOSIS — R05.9 COUGH: ICD-10-CM

## 2022-04-15 DIAGNOSIS — J45.50 SEVERE PERSISTENT ASTHMA, UNSPECIFIED WHETHER COMPLICATED: Primary | ICD-10-CM

## 2022-04-15 DIAGNOSIS — J30.2 SEASONAL ALLERGIES: ICD-10-CM

## 2022-04-15 DIAGNOSIS — R06.2 WHEEZING: ICD-10-CM

## 2022-04-15 DIAGNOSIS — D86.9 SARCOIDOSIS: ICD-10-CM

## 2022-04-15 DIAGNOSIS — J30.9 ALLERGIC RHINITIS, UNSPECIFIED SEASONALITY, UNSPECIFIED TRIGGER: ICD-10-CM

## 2022-04-15 DIAGNOSIS — R06.09 DYSPNEA ON EXERTION: ICD-10-CM

## 2022-04-15 PROCEDURE — 99214 OFFICE O/P EST MOD 30 MIN: CPT | Performed by: NURSE PRACTITIONER

## 2022-04-15 RX ORDER — AZITHROMYCIN 250 MG/1
TABLET, FILM COATED ORAL
Qty: 6 TABLET | Refills: 0 | Status: SHIPPED | OUTPATIENT
Start: 2022-04-15 | End: 2023-01-16

## 2022-04-15 RX ORDER — LEVOTHYROXINE SODIUM 0.1 MG/1
100 TABLET ORAL DAILY
COMMUNITY

## 2022-04-15 RX ORDER — PREDNISONE 20 MG/1
40 TABLET ORAL DAILY
Qty: 14 TABLET | Refills: 3 | Status: SHIPPED | OUTPATIENT
Start: 2022-04-15 | End: 2022-04-22

## 2022-04-15 RX ORDER — BENRALIZUMAB 30 MG/ML
30 INJECTION, SOLUTION SUBCUTANEOUS
Qty: 1 PEN | Refills: 5 | Status: SHIPPED | OUTPATIENT
Start: 2022-04-15 | End: 2023-04-03

## 2022-04-15 RX ORDER — LIOTHYRONINE SODIUM 5 UG/1
TABLET ORAL
Status: ON HOLD | COMMUNITY
Start: 2022-02-23 | End: 2023-01-20

## 2022-04-15 RX ORDER — BENRALIZUMAB 30 MG/ML
30 INJECTION, SOLUTION SUBCUTANEOUS TAKE AS DIRECTED
Qty: 11 EACH | Refills: 0 | Status: SHIPPED | OUTPATIENT
Start: 2022-04-15 | End: 2022-04-15

## 2022-04-15 NOTE — PROGRESS NOTES
Primary Care Provider  Everett Tapia MD     Referring Provider  No ref. provider found     Chief Complaint  Asthma (Renew Fasenra) and Wheezing    Subjective          Lamar Vee presents to [facility]  History of Present Illness  Lamar Vee is a 51 y.o. female patient of  here for management for sarcoidosis and severe persistent asthma.    She states she is doing well since her last visit.  She did take a course of steroids this past week.  She denies using any antibiotics.  She continues to take Symbicort and albuterol for her breathing.  She is currently on Fasenra and is in need of a new Fasenra prescription.  Since starting Fasenra treatment, patient has noticed increase in lung function.  She has had decrease instances of needing steroids, decrease exacerbations, decrease in symptoms such as shortness of breath, nighttime awakenings, cough, and fatigue.  She is also on Singulair, Flonase and Zyrtec for seasonal allergies/allergic rhinitis.  Patient is that her shortness of breath is mild in severity, worse with exertion and improved with rest.  She does have some occasional wheezing but denies a productive cough.  Patient is up-to-date with her flu, pneumonia, and Covid vaccine.  She is able to perform her ADLs without difficulty.  At this time, patient has no other concerns.  She would like to have a course of antibiotics and steroids on hand to have if needed     Her history of smoking is   Tobacco Use: Low Risk    • Smoking Tobacco Use: Never Smoker   • Smokeless Tobacco Use: Never Used   .    Review of Systems   Constitutional: Negative for chills, fatigue, fever, unexpected weight gain and unexpected weight loss.   HENT: Negative for congestion (Nasal), hearing loss, mouth sores, nosebleeds, postnasal drip, sore throat and trouble swallowing.    Eyes: Negative for blurred vision and visual disturbance.   Respiratory: Positive  for shortness of breath and wheezing. Negative for apnea and cough.         Negative for Hemoptysis     Cardiovascular: Negative for chest pain, palpitations and leg swelling.   Gastrointestinal: Negative for abdominal pain, constipation, diarrhea, nausea, vomiting and GERD.        Negative for Jaundice  Negative for Bloating  Negative for Melena   Musculoskeletal: Negative for joint swelling and myalgias.        Negative for Joint pain  Negative for Joint stiffness   Skin: Negative for color change.        Negative for cyanosis   Neurological: Negative for syncope, weakness, numbness and headache.      Sleep: Negative for Excessive daytime sleepiness  Negative for morning headaches  Negative for Snoring    Family History   Problem Relation Age of Onset   • Cancer Mother    • Cancer Maternal Aunt    • Heart disease Maternal Aunt         Social History     Socioeconomic History   • Marital status:    Tobacco Use   • Smoking status: Never Smoker   • Smokeless tobacco: Never Used   Vaping Use   • Vaping Use: Never used   Substance and Sexual Activity   • Alcohol use: Defer   • Drug use: Defer   • Sexual activity: Defer        History reviewed. No pertinent past medical history.     Immunization History   Administered Date(s) Administered   • COVID-19 (MODERNA) 1st, 2nd, 3rd Dose Only 12/23/2020, 01/20/2021   • Flu Vaccine Quad PF >36MO 11/19/2014, 11/12/2015, 09/13/2016, 09/19/2017   • Pneumococcal Conjugate 13-Valent (PCV13) 09/13/2016, 09/19/2017   • Pneumococcal Polysaccharide (PPSV23) 11/19/2014         Allergies   Allergen Reactions   • Naloxone    • Tetanus Toxoids    • Toradol [Ketorolac Tromethamine]    • Tyloxapol           Current Outpatient Medications:   •  amitriptyline (ELAVIL) 100 MG tablet, TAKE ONE TABLET BY MOUTH AT BEDTIME, Disp: , Rfl: 3  •  budesonide-formoterol (Symbicort) 160-4.5 MCG/ACT inhaler, Inhale 2 puffs 2 (Two) Times a Day., Disp: 3 each, Rfl: 4  •  buprenorphine (SUBUTEX) 8 MG  sublingual tablet SL tablet, Dissolve THREE tablets UNDER THE TONGUE EVERY DAY, Disp: , Rfl: 0  •  cetirizine (zyrTEC) 10 MG tablet, TAKE ONE TABLET BY MOUTH AT BEDTIME, Disp: , Rfl: 3  •  fluticasone (Flonase) 50 MCG/ACT nasal spray, 2 sprays into the nostril(s) as directed by provider Daily., Disp: 1 g, Rfl: 3  •  ibuprofen (ADVIL,MOTRIN) 600 MG tablet, TAKE ONE TABLET BY MOUTH THREE TIMES DAILY, Disp: , Rfl: 1  •  lamoTRIgine (LaMICtal) 200 MG tablet, TAKE ONE TABLET BY MOUTH AT BEDTIME, Disp: , Rfl: 4  •  levothyroxine (SYNTHROID, LEVOTHROID) 100 MCG tablet, Take 100 mcg by mouth Daily., Disp: , Rfl:   •  liothyronine (CYTOMEL) 5 MCG tablet, , Disp: , Rfl:   •  montelukast (SINGULAIR) 10 MG tablet, TAKE ONE TABLET BY MOUTH EVERY DAY, Disp: , Rfl: 1  •  omeprazole (priLOSEC) 40 MG capsule, TAKE ONE CAPSULE BY MOUTH TWICE DAILY, Disp: , Rfl: 2  •  prazosin (MINIPRESS) 2 MG capsule, TAKE TWO CAPSULES BY MOUTH AT BEDTIME, Disp: , Rfl: 4  •  PROAIR RESPICLICK 108 (90 BASE) MCG/ACT inhaler, USE ONE PUFF FOUR TIMES DAILY, Disp: , Rfl: 5  •  rOPINIRole (REQUIP) 2 MG tablet, TAKE ONE TABLET BY MOUTH AT BEDTIME, Disp: , Rfl: 5  •  tiZANidine (ZANAFLEX) 4 MG tablet, TAKE TWO TABLETS BY MOUTH THREE TIMES DAILY AS NEEDED, Disp: , Rfl: 5  •  azithromycin (ZITHROMAX) 250 MG tablet, Take 2 by mouth today then 1 daily for 4 days, Disp: 6 tablet, Rfl: 0  •  Benralizumab (Fasenra) 30 MG/ML solution prefilled syringe, Inject 1 mL under the skin into the appropriate area as directed Take As Directed., Disp: 11 each, Rfl: 0  •  buPROPion XL (WELLBUTRIN XL) 150 MG 24 hr tablet, TAKE ONE TABLET BY MOUTH EVERY MORNING, Disp: , Rfl: 4  •  clonazePAM (KlonoPIN) 1 MG tablet, TAKE ONE TABLET BY MOUTH EVERY DAY, Disp: , Rfl: 0  •  Desvenlafaxine Succinate ER 25 MG tablet sustained-release 24 hour, TAKE TWO TABLETS EVERY MORNING, Disp: , Rfl: 1  •  diazePAM (VALIUM) 10 MG tablet, Take 10 mg by mouth., Disp: , Rfl:   •  gabapentin (NEURONTIN)  300 MG capsule, Take 300 mg by mouth 3 (Three) Times a Day., Disp: , Rfl:   •  ipratropium-albuterol (DUO-NEB) 0.5-2.5 mg/mL nebulizer, INHALE THE contents of ONE vial (3mls) EVERY 4 HOURS AS NEEDED FOR SHORTNESS OF BREATH, Disp: , Rfl: 6  •  metroNIDAZOLE (Flagyl) 500 MG tablet, Take 1 tablet PO TID  X 10 days, Disp: 30 tablet, Rfl: 0  •  pantoprazole (Protonix) 40 MG EC tablet, Take 1 tablet by mouth Daily., Disp: 30 tablet, Rfl: 4  •  phentermine 30 MG capsule, TAKE ONE CAPSULE BY MOUTH EVERY DAY, Disp: , Rfl: 0  •  predniSONE (DELTASONE) 20 MG tablet, Take 2 tablets by mouth Daily for 7 days., Disp: 14 tablet, Rfl: 3  •  Rimegepant Sulfate (Nurtec) 75 MG tablet dispersible tablet, Take  by mouth., Disp: , Rfl:      Objective   Physical Exam  Constitutional:       General: She is not in acute distress.     Appearance: Normal appearance. She is normal weight.   HENT:      Right Ear: Hearing normal.      Left Ear: Hearing normal.      Nose: No nasal tenderness or congestion.      Mouth/Throat:      Mouth: Mucous membranes are moist. No oral lesions.   Eyes:      Extraocular Movements: Extraocular movements intact.      Pupils: Pupils are equal, round, and reactive to light.   Neck:      Thyroid: No thyroid mass or thyromegaly.   Cardiovascular:      Rate and Rhythm: Normal rate and regular rhythm.      Pulses: Normal pulses.      Heart sounds: Normal heart sounds. No murmur heard.  Pulmonary:      Effort: Pulmonary effort is normal.      Breath sounds: Wheezing present. No rhonchi or rales.   Chest:   Breasts:      Right: No axillary adenopathy.       Abdominal:      General: Bowel sounds are normal. There is no distension.      Palpations: Abdomen is soft.      Tenderness: There is no abdominal tenderness.   Musculoskeletal:      Cervical back: Neck supple.      Right lower leg: No edema.      Left lower leg: No edema.   Lymphadenopathy:      Cervical: No cervical adenopathy.      Upper Body:      Right upper  "body: No axillary adenopathy.   Skin:     General: Skin is warm and dry.      Findings: No lesion or rash.   Neurological:      General: No focal deficit present.      Mental Status: She is alert and oriented to person, place, and time.      Cranial Nerves: Cranial nerves are intact.   Psychiatric:         Mood and Affect: Affect normal. Mood is not anxious or depressed.         Vital Signs:   BP 90/56 (BP Location: Left arm, Patient Position: Sitting, Cuff Size: Adult)   Pulse 78   Temp 98 °F (36.7 °C) (Infrared)   Resp 14   Ht 154.9 cm (61\")   Wt 64.9 kg (143 lb)   SpO2 95% Comment: room air  BMI 27.02 kg/m²        Result Review :       Data reviewed: Radiologic studies Chest x-ray 8/17/2021 and Dr. Chan's last office note   Procedures        Assessment and Plan    Diagnoses and all orders for this visit:    1. Severe persistent asthma, unspecified whether complicated (Primary)  -     Benralizumab (Fasenra) 30 MG/ML solution prefilled syringe; Inject 1 mL under the skin into the appropriate area as directed Take As Directed.  Dispense: 11 each; Refill: 0  -     azithromycin (ZITHROMAX) 250 MG tablet; Take 2 by mouth today then 1 daily for 4 days  Dispense: 6 tablet; Refill: 0  -     predniSONE (DELTASONE) 20 MG tablet; Take 2 tablets by mouth Daily for 7 days.  Dispense: 14 tablet; Refill: 3    2. Seasonal allergies    3. Allergic rhinitis, unspecified seasonality, unspecified trigger    4. Wheezing    5. Cough    6. Dyspnea on exertion    7. Sarcoidosis    8.  Continue Symbicort as prescribed.  Rinse mouth out after each use.  9.  Continue albuterol as needed.  10.  Continue Singulair, Zyrtec, and Flonase for seasonal allergies and allergic rhinitis.  11.  Continue Fasenra as scheduled.  Prescription renewed today.  12.  Antibiotic and prednisone burst given to patient to have on hand.  13.  Follow-up with Dr. Field in 6 months, sooner if needed.        Follow Up   Return in about 6 months (around " 10/15/2022) for Recheck with Rashida.  Patient was given instructions and counseling regarding her condition or for health maintenance advice. Please see specific information pulled into the AVS if appropriate.

## 2022-08-11 RX ORDER — PANTOPRAZOLE SODIUM 40 MG/1
TABLET, DELAYED RELEASE ORAL
Qty: 30 TABLET | Refills: 3 | Status: SHIPPED | OUTPATIENT
Start: 2022-08-11 | End: 2022-12-19

## 2022-08-15 ENCOUNTER — TELEPHONE (OUTPATIENT)
Dept: SURGERY | Facility: CLINIC | Age: 52
End: 2022-08-15

## 2022-08-15 NOTE — TELEPHONE ENCOUNTER
Caller: Lamar Vee    Relationship: Self    Best call back number:[phone]  What is the best time to reach you: ANYTIME    Who are you requesting to speak with (clinical staff, provider,  specific staff member): PT IS NOT SURE WHO CALLED HER TODAY.     Do you know the name of the person who called: LAMAR    What was the call regarding: PT RECEIVED A MISSED CALL BUT NOT SURE WHO CALLED. SHE DOESN'T HAVE A VOICEMAIL.     Do you require a callback: YES

## 2022-08-16 RX ORDER — ALBUTEROL SULFATE 90 UG/1
AEROSOL, METERED RESPIRATORY (INHALATION)
Qty: 8.5 G | Refills: 11 | Status: SHIPPED | OUTPATIENT
Start: 2022-08-16

## 2022-12-19 RX ORDER — PANTOPRAZOLE SODIUM 40 MG/1
TABLET, DELAYED RELEASE ORAL
Qty: 30 TABLET | Refills: 3 | Status: SHIPPED | OUTPATIENT
Start: 2022-12-19

## 2023-01-12 ENCOUNTER — OFFICE VISIT (OUTPATIENT)
Dept: PULMONOLOGY | Facility: CLINIC | Age: 53
End: 2023-01-12
Payer: COMMERCIAL

## 2023-01-12 VITALS
OXYGEN SATURATION: 94 % | WEIGHT: 156 LBS | HEART RATE: 79 BPM | SYSTOLIC BLOOD PRESSURE: 144 MMHG | DIASTOLIC BLOOD PRESSURE: 86 MMHG | RESPIRATION RATE: 19 BRPM | TEMPERATURE: 98.6 F | HEIGHT: 61 IN | BODY MASS INDEX: 29.45 KG/M2

## 2023-01-12 DIAGNOSIS — D86.0 PULMONARY SARCOIDOSIS: ICD-10-CM

## 2023-01-12 DIAGNOSIS — R91.8 PULMONARY INFILTRATE: Primary | ICD-10-CM

## 2023-01-12 DIAGNOSIS — J82.83 EOSINOPHILIC ASTHMA: ICD-10-CM

## 2023-01-12 PROCEDURE — 99214 OFFICE O/P EST MOD 30 MIN: CPT | Performed by: INTERNAL MEDICINE

## 2023-01-12 RX ORDER — CYCLOBENZAPRINE HCL 5 MG
1 TABLET ORAL EVERY 8 HOURS
Status: ON HOLD | COMMUNITY
Start: 2022-03-01 | End: 2023-01-20

## 2023-01-12 RX ORDER — FUROSEMIDE 20 MG/1
20 TABLET ORAL DAILY
COMMUNITY
Start: 2023-01-01

## 2023-01-12 RX ORDER — LINACLOTIDE 290 UG/1
290 CAPSULE, GELATIN COATED ORAL DAILY
COMMUNITY
Start: 2022-10-12

## 2023-01-12 RX ORDER — NITROFURANTOIN MACROCRYSTALS 100 MG/1
100 CAPSULE ORAL 4 TIMES DAILY
Status: ON HOLD | COMMUNITY
End: 2023-01-20

## 2023-01-12 RX ORDER — LEVOTHYROXINE SODIUM 25 MCG
25 TABLET ORAL DAILY
COMMUNITY
Start: 2022-12-16

## 2023-01-12 RX ORDER — PREDNISONE 10 MG/1
TABLET ORAL
Qty: 31 TABLET | Refills: 0 | Status: SHIPPED | OUTPATIENT
Start: 2023-01-12 | End: 2023-01-27

## 2023-01-12 NOTE — ASSESSMENT & PLAN NOTE
CT scan reviewed showing no active sarcoid however patient does have some infiltrate we will proceed with bronchoscopy with BAL and transbronchial lung biopsy

## 2023-01-12 NOTE — ASSESSMENT & PLAN NOTE
Persistent symptoms now ongoing for greater than 6 months  CT scan reviewed showing diffuse tree-in-bud opacities throughout both lower lobes    Given persistent symptoms despite antibiotic therapy will take patient for bronchoscopy    Risk versus benefits of bronchoscopy explained to the patient including death, respiratory failure requiring intubation mechanical ventilation, pneumothorax requiring chest tube placement, bleeding, patient voices understanding of the risk of the procedure and wishes to proceed.

## 2023-01-12 NOTE — H&P (VIEW-ONLY)
"Chief Complaint  Severe persistent asthma , Follow-up (6 Month ), Shortness of Breath, Cough, and Wheezing    Subjective        Lamar Vee presents to [facility] PULMONARY & CRITICAL CARE MEDICINE  History of Present Illness  With history of eosinophilic asthma history of sarcoid here for follow-up  Has been having worsening shortness of breath and cough for the last 6 months  Recently hospitalized at [facility]  Recent CT scan from the middle part of December 2022 shows bibasilar infiltrates concerning for infectious process  CT scan reviewed showing significant tree-in-bud opacities  Has worsening shortness of breath  Has worsening wheezing  Objective   Vital Signs:  /86 (BP Location: Left arm, Patient Position: Sitting, Cuff Size: Large Adult)   Pulse 79   Temp 98.6 °F (37 °C) (Temporal)   Resp 19   Ht 155.4 cm (61.2\")   Wt 70.8 kg (156 lb)   SpO2 94% Comment: room air  BMI 29.28 kg/m²   Estimated body mass index is 29.28 kg/m² as calculated from the following:    Height as of this encounter: 155.4 cm (61.2\").    Weight as of this encounter: 70.8 kg (156 lb).             Physical Exam   Vital Signs Reviewed  General WDWN, Alert, NAD.    Chest: Scattered wheezing and rhonchi noted   CV: RRR, no MGR, .  EXT:  no clubbing, no cyanosis, no edema, no joint tenderness  Neuro:  A&Ox3, CN grossly intact, no focal deficits.  Skin: No rashes or lesions noted  Result Review :      Data reviewed: Radiologic studies CT scan showing bibasilar infiltrates             Assessment and Plan   Diagnoses and all orders for this visit:    1. Pulmonary infiltrate (Primary)  Assessment & Plan:  Persistent symptoms now ongoing for greater than 6 months  CT scan reviewed showing diffuse tree-in-bud opacities throughout both lower lobes    Given persistent symptoms despite antibiotic therapy will take patient for bronchoscopy    Risk versus benefits of bronchoscopy explained to the patient " including death, respiratory failure requiring intubation mechanical ventilation, pneumothorax requiring chest tube placement, bleeding, patient voices understanding of the risk of the procedure and wishes to proceed.    Orders:  -     Case Request; Standing  -     Follow Anesthesia Guidlines / Standing Orders; Standing  -     Obtain Informed Consent; Standing  -     Case Request    2. Eosinophilic asthma  Assessment & Plan:  Asthma continue Symbicort, continue albuterol, continue Fasenra      3. Pulmonary sarcoidosis (HCC)  Assessment & Plan:  CT scan reviewed showing no active sarcoid however patient does have some infiltrate we will proceed with bronchoscopy with BAL and transbronchial lung biopsy      Other orders  -     predniSONE (DELTASONE) 10 MG tablet; Take 4 tabs daily x 3 days, then take 3 tabs daily x 3 days, then take 2 tabs daily x 3 days, then take 1 tab daily x 3 days  Dispense: 31 tablet; Refill: 0           Follow Up   Return in about 2 weeks (around 1/26/2023).  Patient was given instructions and counseling regarding her condition or for health maintenance advice. Please see specific information pulled into the AVS if appropriate.

## 2023-01-12 NOTE — PROGRESS NOTES
"Chief Complaint  Severe persistent asthma , Follow-up (6 Month ), Shortness of Breath, Cough, and Wheezing    Subjective        Lamar Vee presents to [facility] PULMONARY & CRITICAL CARE MEDICINE  History of Present Illness  With history of eosinophilic asthma history of sarcoid here for follow-up  Has been having worsening shortness of breath and cough for the last 6 months  Recently hospitalized at [facility]  Recent CT scan from the middle part of December 2022 shows bibasilar infiltrates concerning for infectious process  CT scan reviewed showing significant tree-in-bud opacities  Has worsening shortness of breath  Has worsening wheezing  Objective   Vital Signs:  /86 (BP Location: Left arm, Patient Position: Sitting, Cuff Size: Large Adult)   Pulse 79   Temp 98.6 °F (37 °C) (Temporal)   Resp 19   Ht 155.4 cm (61.2\")   Wt 70.8 kg (156 lb)   SpO2 94% Comment: room air  BMI 29.28 kg/m²   Estimated body mass index is 29.28 kg/m² as calculated from the following:    Height as of this encounter: 155.4 cm (61.2\").    Weight as of this encounter: 70.8 kg (156 lb).             Physical Exam   Vital Signs Reviewed  General WDWN, Alert, NAD.    Chest: Scattered wheezing and rhonchi noted   CV: RRR, no MGR, .  EXT:  no clubbing, no cyanosis, no edema, no joint tenderness  Neuro:  A&Ox3, CN grossly intact, no focal deficits.  Skin: No rashes or lesions noted  Result Review :      Data reviewed: Radiologic studies CT scan showing bibasilar infiltrates             Assessment and Plan   Diagnoses and all orders for this visit:    1. Pulmonary infiltrate (Primary)  Assessment & Plan:  Persistent symptoms now ongoing for greater than 6 months  CT scan reviewed showing diffuse tree-in-bud opacities throughout both lower lobes    Given persistent symptoms despite antibiotic therapy will take patient for bronchoscopy    Risk versus benefits of bronchoscopy explained to the patient " including death, respiratory failure requiring intubation mechanical ventilation, pneumothorax requiring chest tube placement, bleeding, patient voices understanding of the risk of the procedure and wishes to proceed.    Orders:  -     Case Request; Standing  -     Follow Anesthesia Guidlines / Standing Orders; Standing  -     Obtain Informed Consent; Standing  -     Case Request    2. Eosinophilic asthma  Assessment & Plan:  Asthma continue Symbicort, continue albuterol, continue Fasenra      3. Pulmonary sarcoidosis (HCC)  Assessment & Plan:  CT scan reviewed showing no active sarcoid however patient does have some infiltrate we will proceed with bronchoscopy with BAL and transbronchial lung biopsy      Other orders  -     predniSONE (DELTASONE) 10 MG tablet; Take 4 tabs daily x 3 days, then take 3 tabs daily x 3 days, then take 2 tabs daily x 3 days, then take 1 tab daily x 3 days  Dispense: 31 tablet; Refill: 0           Follow Up   Return in about 2 weeks (around 1/26/2023).  Patient was given instructions and counseling regarding her condition or for health maintenance advice. Please see specific information pulled into the AVS if appropriate.

## 2023-01-16 NOTE — PRE-PROCEDURE INSTRUCTIONS
Education provided on diet restrictions prior to procedure.      Patient prefers to bring an updated list of home medications on the day of the procedure.  She v/u to bring inhalers with her that morning, and will try to do a nebulizer treatment at home prior to arrival.    Advised Tylenol is okay, however do not take any NSAIDS.  Hold all vitamins or supplements, these can be resumed after the procedure.

## 2023-01-20 ENCOUNTER — ANESTHESIA EVENT (OUTPATIENT)
Dept: GASTROENTEROLOGY | Facility: HOSPITAL | Age: 53
End: 2023-01-20
Payer: COMMERCIAL

## 2023-01-20 ENCOUNTER — ANESTHESIA (OUTPATIENT)
Dept: GASTROENTEROLOGY | Facility: HOSPITAL | Age: 53
End: 2023-01-20
Payer: COMMERCIAL

## 2023-01-20 ENCOUNTER — HOSPITAL ENCOUNTER (OUTPATIENT)
Facility: HOSPITAL | Age: 53
Setting detail: HOSPITAL OUTPATIENT SURGERY
Discharge: HOME | End: 2023-01-20
Attending: INTERNAL MEDICINE | Admitting: INTERNAL MEDICINE
Payer: COMMERCIAL

## 2023-01-20 VITALS
SYSTOLIC BLOOD PRESSURE: 90 MMHG | TEMPERATURE: 98.2 F | WEIGHT: 157.85 LBS | OXYGEN SATURATION: 95 % | DIASTOLIC BLOOD PRESSURE: 72 MMHG | RESPIRATION RATE: 15 BRPM | BODY MASS INDEX: 29.63 KG/M2 | HEART RATE: 80 BPM

## 2023-01-20 DIAGNOSIS — R91.8 PULMONARY INFILTRATE: ICD-10-CM

## 2023-01-20 LAB
ACB CMPLX DNA BAL NAA+NON-PRB-NCNCRNG: NOT DETECTED
BLACTX-M ISLT/SPM QL: NORMAL
BLAIMP ISLT/SPM QL: NORMAL
BLAKPC ISLT/SPM QL: NORMAL
BLAOXA-48-LIKE ISLT/SPM QL: NORMAL
BLAVIM ISLT/SPM QL: NORMAL
C PNEUM DNA NPH QL NAA+NON-PROBE: NOT DETECTED
CILIATED BAL QL: 4 %
E CLOAC COMP DNA BAL NAA+NON-PRB-NCNCRNG: NOT DETECTED
E COLI DNA BAL NAA+NON-PRB-NCNCRNG: NOT DETECTED
FLUAV SUBTYP SPEC NAA+PROBE: NOT DETECTED
FLUBV RNA ISLT QL NAA+PROBE: NOT DETECTED
GP B STREP DNA BAL NAA+NON-PRB-NCNCRNG: NOT DETECTED
HADV DNA SPEC NAA+PROBE: NOT DETECTED
HAEM INFLU DNA BAL NAA+NON-PRB-NCNCRNG: NOT DETECTED
HCOV RNA LOWER RESP QL NAA+NON-PROBE: NOT DETECTED
HMPV RNA NPH QL NAA+NON-PROBE: NOT DETECTED
HPIV RNA LOWER RESP QL NAA+NON-PROBE: NOT DETECTED
K AEROGENES DNA BAL NAA+NON-PRB-NCNCRNG: NOT DETECTED
K OXYTOCA DNA BAL NAA+NON-PRB-NCNCRNG: NOT DETECTED
K PNEU GRP DNA BAL NAA+NON-PRB-NCNCRNG: NOT DETECTED
L PNEUMO DNA LOWER RESP QL NAA+NON-PROBE: NOT DETECTED
LYMPHOCYTES NFR FLD MANUAL: 13 %
M CATARRHALIS DNA BAL NAA+NON-PRB-NCNCRNG: NOT DETECTED
M PNEUMO IGG SER IA-ACNC: NOT DETECTED
MACROPHAGE FLUID: 6 %
MECA+MECC ISLT/SPM QL: NORMAL
NDM GENE: NORMAL
NEUTROPHILS NFR FLD MANUAL: 77 %
P AERUGINOSA DNA BAL NAA+NON-PRB-NCNCRNG: NOT DETECTED
PROTEUS SP DNA BAL NAA+NON-PRB-NCNCRNG: NOT DETECTED
RHINOVIRUS RNA SPEC NAA+PROBE: NOT DETECTED
RSV RNA NPH QL NAA+NON-PROBE: NOT DETECTED
S AUREUS DNA BAL NAA+NON-PRB-NCNCRNG: NOT DETECTED
S MARCESCENS DNA BAL NAA+NON-PRB-NCNCRNG: NOT DETECTED
S PNEUM DNA BAL NAA+NON-PRB-NCNCRNG: NOT DETECTED
S PYO DNA BAL NAA+NON-PRB-NCNCRNG: NOT DETECTED
VISUAL PRESENCE OF BLOOD: NORMAL

## 2023-01-20 PROCEDURE — 89051 BODY FLUID CELL COUNT: CPT | Performed by: INTERNAL MEDICINE

## 2023-01-20 PROCEDURE — 88305 TISSUE EXAM BY PATHOLOGIST: CPT | Performed by: INTERNAL MEDICINE

## 2023-01-20 PROCEDURE — 87116 MYCOBACTERIA CULTURE: CPT | Performed by: INTERNAL MEDICINE

## 2023-01-20 PROCEDURE — 87071 CULTURE AEROBIC QUANT OTHER: CPT | Performed by: INTERNAL MEDICINE

## 2023-01-20 PROCEDURE — 87633 RESP VIRUS 12-25 TARGETS: CPT | Performed by: INTERNAL MEDICINE

## 2023-01-20 PROCEDURE — 31624 DX BRONCHOSCOPE/LAVAGE: CPT | Performed by: INTERNAL MEDICINE

## 2023-01-20 PROCEDURE — 88312 SPECIAL STAINS GROUP 1: CPT | Performed by: INTERNAL MEDICINE

## 2023-01-20 PROCEDURE — 87205 SMEAR GRAM STAIN: CPT | Performed by: INTERNAL MEDICINE

## 2023-01-20 PROCEDURE — 88108 CYTOPATH CONCENTRATE TECH: CPT | Performed by: INTERNAL MEDICINE

## 2023-01-20 PROCEDURE — 87102 FUNGUS ISOLATION CULTURE: CPT | Performed by: INTERNAL MEDICINE

## 2023-01-20 PROCEDURE — 25010000002 PROPOFOL 10 MG/ML EMULSION: Performed by: NURSE ANESTHETIST, CERTIFIED REGISTERED

## 2023-01-20 PROCEDURE — 31628 BRONCHOSCOPY/LUNG BX EACH: CPT | Performed by: INTERNAL MEDICINE

## 2023-01-20 PROCEDURE — 87206 SMEAR FLUORESCENT/ACID STAI: CPT | Performed by: INTERNAL MEDICINE

## 2023-01-20 RX ORDER — LIDOCAINE HYDROCHLORIDE 20 MG/ML
INJECTION, SOLUTION EPIDURAL; INFILTRATION; INTRACAUDAL; PERINEURAL AS NEEDED
Status: DISCONTINUED | OUTPATIENT
Start: 2023-01-20 | End: 2023-01-20 | Stop reason: SURG

## 2023-01-20 RX ORDER — MAGNESIUM HYDROXIDE 1200 MG/15ML
LIQUID ORAL AS NEEDED
Status: DISCONTINUED | OUTPATIENT
Start: 2023-01-20 | End: 2023-01-20 | Stop reason: HOSPADM

## 2023-01-20 RX ORDER — LIDOCAINE HYDROCHLORIDE 40 MG/ML
INJECTION, SOLUTION RETROBULBAR; TOPICAL AS NEEDED
Status: DISCONTINUED | OUTPATIENT
Start: 2023-01-20 | End: 2023-01-20 | Stop reason: HOSPADM

## 2023-01-20 RX ORDER — SODIUM CHLORIDE, SODIUM LACTATE, POTASSIUM CHLORIDE, CALCIUM CHLORIDE 600; 310; 30; 20 MG/100ML; MG/100ML; MG/100ML; MG/100ML
30 INJECTION, SOLUTION INTRAVENOUS CONTINUOUS
Status: DISCONTINUED | OUTPATIENT
Start: 2023-01-20 | End: 2023-01-20 | Stop reason: HOSPADM

## 2023-01-20 RX ORDER — DEXMEDETOMIDINE HYDROCHLORIDE 100 UG/ML
INJECTION, SOLUTION INTRAVENOUS AS NEEDED
Status: DISCONTINUED | OUTPATIENT
Start: 2023-01-20 | End: 2023-01-20 | Stop reason: SURG

## 2023-01-20 RX ORDER — PROPOFOL 10 MG/ML
VIAL (ML) INTRAVENOUS AS NEEDED
Status: DISCONTINUED | OUTPATIENT
Start: 2023-01-20 | End: 2023-01-20 | Stop reason: SURG

## 2023-01-20 RX ADMIN — PROPOFOL 200 MCG/KG/MIN: 10 INJECTION, EMULSION INTRAVENOUS at 14:50

## 2023-01-20 RX ADMIN — LIDOCAINE HYDROCHLORIDE 50 MG: 20 INJECTION, SOLUTION EPIDURAL; INFILTRATION; INTRACAUDAL; PERINEURAL at 14:50

## 2023-01-20 RX ADMIN — DEXMEDETOMIDINE HYDROCHLORIDE 15 MCG: 100 INJECTION, SOLUTION, CONCENTRATE INTRAVENOUS at 14:47

## 2023-01-20 RX ADMIN — PROPOFOL 50 MG: 10 INJECTION, EMULSION INTRAVENOUS at 14:53

## 2023-01-20 RX ADMIN — SODIUM CHLORIDE, POTASSIUM CHLORIDE, SODIUM LACTATE AND CALCIUM CHLORIDE: 600; 310; 30; 20 INJECTION, SOLUTION INTRAVENOUS at 14:45

## 2023-01-20 RX ADMIN — PROPOFOL 80 MG: 10 INJECTION, EMULSION INTRAVENOUS at 14:50

## 2023-01-20 NOTE — INTERVAL H&P NOTE
Allergies   Allergen Reactions    Morphine Unknown - Low Severity    Naloxone     Pentazocine Unknown - Low Severity    Tetanus Toxoids     Toradol [Ketorolac Tromethamine]     Tyloxapol        H&P reviewed. The patient was examined and there are no changes to the H&P.

## 2023-01-20 NOTE — ANESTHESIA PREPROCEDURE EVALUATION
Anesthesia Evaluation     Patient summary reviewed and Nursing notes reviewed   no history of anesthetic complications:  NPO Solid Status: > 8 hours  NPO Liquid Status: > 2 hours           Airway   Mallampati: II  TM distance: >3 FB  Neck ROM: full  No difficulty expected  Dental      Pulmonary - normal exam    breath sounds clear to auscultation  (+) asthma,    ROS comment: Sarcoid, steroids past week  Cardiovascular - normal exam  Exercise tolerance: good (4-7 METS)    Rhythm: regular  Rate: normal    (+) hypertension,       Neuro/Psych- negative ROS  GI/Hepatic/Renal/Endo    (+)   thyroid problem     Musculoskeletal     Abdominal    Substance History   (+) drug use (on suboxone)     OB/GYN          Other        ROS/Med Hx Other: PAT Nursing Notes unavailable.                 Anesthesia Plan    ASA 3     general   total IV anesthesia    Anesthetic plan, risks, benefits, and alternatives have been provided, discussed and informed consent has been obtained with: patient.        CODE STATUS:

## 2023-01-20 NOTE — OP NOTE
Procedure name bronchoscopy with bronchoalveolar lavage and transbronchial lung biopsy     Indication history of sarcoidosis with no unresolving pneumonia     Sedation-IV MAC per anesthesia service        Procedure details  Patient was brought back to the bronchoscopy suite a bite block was placed in the oral cavity, and a therapeutic bronchoscope was then used to intubate the trachea, the vocal cords inspected and appeared to have normal motion with inhalation and ventilation, inspection was performed of the left tracheobronchial tree and there were no endobronchial lesion seen to the segmental level, inspection of the right tracheobronchial tree showed no endobronchial lesions to the segmental level.  At this point the bronchoscope was wedged into the right lower lobe and numerous transbronchial lung biopsies performed from the right lower lobe A bronchoalveolar lavage was obtained from right lower lobe bronchus           estimated blood loss  none     postoperative diagnosis  Sarcoidosis with known resolving pneumonia    Patient tolerated procedure well no complications        Plan  Patient is to followup in my office as scheduled to go the results of the pathology/cytology, and microbiology

## 2023-01-20 NOTE — ANESTHESIA POSTPROCEDURE EVALUATION
Patient: Lamar Vee    Procedure Summary     Date: 01/20/23 Room / Location: [facility] ENDOSCOPY 3 / [facility] ENDOSCOPY    Anesthesia Start: 1445 Anesthesia Stop: 1516    Procedure: BRONCHOSCOPY WITH BIOPSIES AND BAL (Bronchus) Diagnosis:       Pulmonary infiltrate      (Pulmonary infiltrate [R91.8])    Surgeons: Victor M Field DO Provider: Naty Schaffer MD    Anesthesia Type: general ASA Status: 3          Anesthesia Type: general    Vitals  Vitals Value Taken Time   /60 01/20/23 1521   Temp 36.7 °C (98 °F) 01/20/23 1515   Pulse 85 01/20/23 1524   Resp 17 01/20/23 1515   SpO2 95 % 01/20/23 1524   Vitals shown include unvalidated device data.        Post Anesthesia Care and Evaluation    Patient location during evaluation: bedside  Patient participation: complete - patient participated  Level of consciousness: awake  Pain management: adequate    Airway patency: patent  Anesthetic complications: No anesthetic complications  PONV Status: none  Cardiovascular status: acceptable and stable  Respiratory status: acceptable  Hydration status: acceptable    Comments: An Anesthesiologist personally participated in the most demanding procedures (including induction and emergence if applicable) in the anesthesia plan, monitored the course of anesthesia administration at frequent intervals and remained physically present and available for immediate diagnosis and treatment of emergencies.

## 2023-01-22 LAB
BACTERIA SPEC AEROBE CULT: NO GROWTH
GRAM STN SPEC: NORMAL

## 2023-01-24 LAB
CYTO UR: NORMAL
CYTO UR: NORMAL
LAB AP CASE REPORT: NORMAL
LAB AP CASE REPORT: NORMAL
LAB AP CLINICAL INFORMATION: NORMAL
LAB AP CLINICAL INFORMATION: NORMAL
LAB AP SPECIAL STAINS: NORMAL
PATH REPORT.FINAL DX SPEC: NORMAL
PATH REPORT.FINAL DX SPEC: NORMAL
PATH REPORT.GROSS SPEC: NORMAL
PATH REPORT.GROSS SPEC: NORMAL
STAT OF ADQ CVX/VAG CYTO-IMP: NORMAL

## 2023-01-26 NOTE — PROGRESS NOTES
Primary Care Provider  Everett Tapia MD     Referring Provider  No ref. provider found     Chief Complaint  Follow-up (2 Week follow up), Sarcoidosis, Cough, Shortness of Breath, Wheezing, and Asthma    Subjective          Lamar Vee presents to [facility] PULMONARY & CRITICAL CARE MEDICINE  History of Present Illness  Lamar Vee is a 52 y.o. female patient of Dr. Field here for 2-week follow-up post bronchoscopy and acute illness.     She also has severe eosinophilic asthma and sarcoidosis.  Patient underwent bronchoscopy with Dr. Field 1/20/2023, so far no growth of AFB, bacterial, viral, or fungal cultures at 1 week.  Unfortunately patient is still feeling short of breath, wheezing, with cough productive of yellow sputum.  She completed 2-week steroid taper about 1 week ago but still feels short of breath and acutely ill.  She is currently taking Symbicort daily, Fasenra injections, and albuterol and DuoNeb treatments as needed.  She reports using her albuterol rescue inhaler several times a day during this acute illness.     After eating with Dr. Field, we will give patient an additional 2 weeks of steroids and antibiotics.  Patient is up-to-date with her flu, pneumonia, and Covid vaccines.  She is able to perform her ADLs without difficulty as long as she rests in between.    Scribed for Rebecca Cole NP by Rosa Crawford NP on 1/27/2023 at 1530      Her history of smoking is   Tobacco Use: Low Risk    • Smoking Tobacco Use: Never   • Smokeless Tobacco Use: Never   • Passive Exposure: Not on file   .    Review of Systems   Constitutional: Negative for chills, fatigue, fever, unexpected weight gain and unexpected weight loss.   HENT: Negative for congestion (Nasal), hearing loss, mouth sores, nosebleeds, postnasal drip, sore throat and trouble swallowing.    Eyes: Negative for blurred vision and visual disturbance.   Respiratory: Positive for cough, shortness of breath  "and wheezing. Negative for apnea.         Negative for Hemoptysis     Cardiovascular: Negative for chest pain, palpitations and leg swelling.   Gastrointestinal: Negative for abdominal pain, constipation, diarrhea, nausea, vomiting and GERD.        Negative for Jaundice  Negative for Bloating  Negative for Melena   Musculoskeletal: Negative for joint swelling and myalgias.        Negative for Joint pain  Negative for Joint stiffness   Skin: Negative for color change.        Negative for cyanosis   Neurological: Negative for syncope, weakness, numbness and headache.      Sleep: Negative for Excessive daytime sleepiness  Negative for morning headaches  Negative for Snoring    Family History   Problem Relation Age of Onset   • Cancer Mother    • Cancer Maternal Aunt    • Heart disease Maternal Aunt    • Malig Hyperthermia Neg Hx         Social History     Socioeconomic History   • Marital status:    Tobacco Use   • Smoking status: Never   • Smokeless tobacco: Never   Vaping Use   • Vaping Use: Never used   Substance and Sexual Activity   • Alcohol use: Not Currently   • Drug use: Yes     Comment: Pt states \"opiate addiction\".   • Sexual activity: Defer        Past Medical History:   Diagnosis Date   • Anxiety and depression    • Asthma    • Disease of thyroid gland    • H/O: substance abuse (HCC)     opiates   • Hypertension    • Melanoma (HCC) 1992   • Migraine    • Neuropathy    • Sarcoidosis    • Seasonal allergies         Immunization History   Administered Date(s) Administered   • COVID-19 (MODERNA) 1st, 2nd, 3rd Dose Only 12/23/2020, 01/20/2021   • Flu Vaccine Quad PF >36MO 11/19/2014, 11/12/2015, 09/13/2016, 09/19/2017   • Flublok 18+yrs 11/01/2022   • Pneumococcal Conjugate 13-Valent (PCV13) 09/13/2016, 09/19/2017   • Pneumococcal Polysaccharide (PPSV23) 11/19/2014         Allergies   Allergen Reactions   • Morphine Unknown - Low Severity   • Naloxone    • Pentazocine Unknown - Low Severity   • Tetanus " Toxoids    • Toradol [Ketorolac Tromethamine]    • Tyloxapol           Current Outpatient Medications:   •  albuterol sulfate  (90 Base) MCG/ACT inhaler, INHALE TWO PUFFS BY MOUTH EVERY 4 HOURS, Disp: 8.5 g, Rfl: 11  •  amitriptyline (ELAVIL) 100 MG tablet, TAKE ONE TABLET BY MOUTH AT BEDTIME, Disp: , Rfl: 3  •  Benralizumab (Fasenra Pen) 30 MG/ML solution auto-injector, Inject 30 mg under the skin into the appropriate area as directed Every 2 (Two) Months., Disp: 1 pen, Rfl: 5  •  budesonide-formoterol (Symbicort) 160-4.5 MCG/ACT inhaler, Inhale 2 puffs 2 (Two) Times a Day., Disp: 3 each, Rfl: 4  •  buprenorphine (SUBUTEX) 8 MG sublingual tablet SL tablet, Dissolve THREE tablets UNDER THE TONGUE EVERY DAY, Disp: , Rfl: 0  •  buPROPion XL (WELLBUTRIN XL) 150 MG 24 hr tablet, TAKE ONE TABLET BY MOUTH EVERY MORNING, Disp: , Rfl: 4  •  cetirizine (zyrTEC) 10 MG tablet, TAKE ONE TABLET BY MOUTH AT BEDTIME, Disp: , Rfl: 3  •  clonazePAM (KlonoPIN) 1 MG tablet, TAKE ONE TABLET BY MOUTH EVERY DAY, Disp: , Rfl: 0  •  fluticasone (Flonase) 50 MCG/ACT nasal spray, 2 sprays into the nostril(s) as directed by provider Daily., Disp: 1 g, Rfl: 3  •  furosemide (LASIX) 20 MG tablet, Take 20 mg by mouth Daily., Disp: , Rfl:   •  ibuprofen (ADVIL,MOTRIN) 600 MG tablet, TAKE ONE TABLET BY MOUTH THREE TIMES DAILY, Disp: , Rfl: 1  •  ipratropium-albuterol (DUO-NEB) 0.5-2.5 mg/mL nebulizer, INHALE THE contents of ONE vial (3mls) EVERY 4 HOURS AS NEEDED FOR SHORTNESS OF BREATH, Disp: , Rfl: 6  •  lamoTRIgine (LaMICtal) 200 MG tablet, TAKE ONE TABLET BY MOUTH AT BEDTIME, Disp: , Rfl: 4  •  levothyroxine (SYNTHROID, LEVOTHROID) 100 MCG tablet, Take 100 mcg by mouth Daily., Disp: , Rfl:   •  Linzess 290 MCG capsule capsule, Take 290 mcg by mouth Daily., Disp: , Rfl:   •  montelukast (SINGULAIR) 10 MG tablet, TAKE ONE TABLET BY MOUTH EVERY DAY, Disp: , Rfl: 1  •  pantoprazole (PROTONIX) 40 MG EC tablet, TAKE ONE TABLET BY MOUTH EVERY  DAY, Disp: 30 tablet, Rfl: 3  •  PROAIR RESPICLICK 108 (90 BASE) MCG/ACT inhaler, USE ONE PUFF FOUR TIMES DAILY, Disp: , Rfl: 5  •  Rimegepant Sulfate (Nurtec) 75 MG tablet dispersible tablet, Take  by mouth., Disp: , Rfl:   •  rOPINIRole (REQUIP) 2 MG tablet, TAKE ONE TABLET BY MOUTH AT BEDTIME, Disp: , Rfl: 5  •  Synthroid 25 MCG tablet, Take 25 mcg by mouth Daily., Disp: , Rfl:   •  tiZANidine (ZANAFLEX) 4 MG tablet, TAKE TWO TABLETS BY MOUTH THREE TIMES DAILY AS NEEDED, Disp: , Rfl: 5  •  levoFLOXacin (Levaquin) 750 MG tablet, Take 1 tablet by mouth Daily for 14 days., Disp: 14 tablet, Rfl: 0  •  predniSONE (DELTASONE) 10 MG tablet, Take 5 tabs daily x 3 days, 4 tabs daily x 3 days, take 3 tabs daily x 3 days, take 2 tabs daily x 3 days, take 1 tab daily x 3 days, Disp: 45 tablet, Rfl: 0     Objective   Physical Exam  Constitutional:       General: She is not in acute distress.     Appearance: Normal appearance. She is normal weight.   HENT:      Right Ear: Hearing normal.      Left Ear: Hearing normal.      Nose: No nasal tenderness or congestion.      Mouth/Throat:      Mouth: Mucous membranes are moist. No oral lesions.   Eyes:      Extraocular Movements: Extraocular movements intact.      Pupils: Pupils are equal, round, and reactive to light.   Neck:      Thyroid: No thyroid mass or thyromegaly.   Cardiovascular:      Rate and Rhythm: Normal rate and regular rhythm.      Pulses: Normal pulses.      Heart sounds: Normal heart sounds. No murmur heard.  Pulmonary:      Effort: Pulmonary effort is normal.      Breath sounds: Decreased breath sounds and wheezing present. No rhonchi or rales.   Abdominal:      General: Bowel sounds are normal. There is no distension.      Palpations: Abdomen is soft.      Tenderness: There is no abdominal tenderness.   Musculoskeletal:      Cervical back: Neck supple.      Right lower leg: No edema.      Left lower leg: No edema.   Lymphadenopathy:      Cervical: No cervical  "adenopathy.      Upper Body:      Right upper body: No axillary adenopathy.   Skin:     General: Skin is warm and dry.      Findings: No lesion or rash.   Neurological:      General: No focal deficit present.      Mental Status: She is alert and oriented to person, place, and time.   Psychiatric:         Mood and Affect: Affect normal. Mood is not anxious or depressed.         Vital Signs:   /90 (BP Location: Right arm, Patient Position: Sitting, Cuff Size: Adult)   Pulse 97   Temp 98.4 °F (36.9 °C) (Temporal)   Resp 18   Ht 154.9 cm (61\")   Wt 73.5 kg (162 lb)   SpO2 92% Comment: room air  BMI 30.61 kg/m²        Result Review :     Personally reviewed bronchoscopy wash, pneumonia panel, BAL culture, AFB culture fungus culture and cytology/pathology from 1/20/2023    Data reviewed: Radiologic studies Chest x-ray 8/17/2021, methacholine challenge test 1/25/2019, recent hospitalization notes Dr. Field bronchoscopy operative note 1/20/2023 and Dr. Field's last office note   Procedures        Assessment and Plan    Diagnoses and all orders for this visit:    1. Pulmonary sarcoidosis (HCC) (Primary)    2. Eosinophilic asthma    3. Severe persistent asthma with acute exacerbation  -     levoFLOXacin (Levaquin) 750 MG tablet; Take 1 tablet by mouth Daily for 14 days.  Dispense: 14 tablet; Refill: 0  -     predniSONE (DELTASONE) 10 MG tablet; Take 5 tabs daily x 3 days, 4 tabs daily x 3 days, take 3 tabs daily x 3 days, take 2 tabs daily x 3 days, take 1 tab daily x 3 days  Dispense: 45 tablet; Refill: 0    4. Recurrent pneumonia  -     levoFLOXacin (Levaquin) 750 MG tablet; Take 1 tablet by mouth Daily for 14 days.  Dispense: 14 tablet; Refill: 0  -     predniSONE (DELTASONE) 10 MG tablet; Take 5 tabs daily x 3 days, 4 tabs daily x 3 days, take 3 tabs daily x 3 days, take 2 tabs daily x 3 days, take 1 tab daily x 3 days  Dispense: 45 tablet; Refill: 0    5. Dyspnea on exertion    6. Chronic cough    7. " Continue Symbicort as prescribed.  Rinse mouth out after each use.  8. Continue albuterol and DuoNeb treatments as needed.  9. Continue Singulair, Zyrtec, and Flonase for seasonal allergies and allergic rhinitis.  10. Continue Fasenra as scheduled.  Prescription renewed today.  11. Will prescribe Levaquin for 14 days and prednisone taper today for patient's acute illness  12. Follow-up with Dr. Field or Rebecca in 1 month to go over finalize results of bronchoscopy and to check in regarding acute exacerbation, or sooner if needed  13. Patient reports she is up-to-date with his flu, pneumonia, and Covid vaccines.  Patient is advised to continue to follow CDC recommendations such as social distancing wearing a mask and washing hands for at least 20 seconds.    I spent 35 minutes caring for Lamar on this date of service. This time includes time spent by me in the following activities:preparing for the visit, reviewing tests, obtaining and/or reviewing a separately obtained history, performing a medically appropriate examination and/or evaluation , counseling and educating the patient/family/caregiver, ordering medications, tests, or procedures, referring and communicating with other health care professionals  and documenting information in the medical record    Follow Up   Return in about 1 month (around 2/27/2023) for Recheck with Remedios.  Patient was given instructions and counseling regarding her condition or for health maintenance advice. Please see specific information pulled into the AVS if appropriate.

## 2023-01-27 ENCOUNTER — OFFICE VISIT (OUTPATIENT)
Dept: PULMONOLOGY | Facility: CLINIC | Age: 53
End: 2023-01-27
Payer: COMMERCIAL

## 2023-01-27 VITALS
TEMPERATURE: 98.4 F | DIASTOLIC BLOOD PRESSURE: 90 MMHG | OXYGEN SATURATION: 92 % | BODY MASS INDEX: 30.58 KG/M2 | HEIGHT: 61 IN | SYSTOLIC BLOOD PRESSURE: 138 MMHG | RESPIRATION RATE: 18 BRPM | HEART RATE: 97 BPM | WEIGHT: 162 LBS

## 2023-01-27 DIAGNOSIS — J18.9 RECURRENT PNEUMONIA: ICD-10-CM

## 2023-01-27 DIAGNOSIS — J82.83 EOSINOPHILIC ASTHMA: ICD-10-CM

## 2023-01-27 DIAGNOSIS — R05.3 CHRONIC COUGH: ICD-10-CM

## 2023-01-27 DIAGNOSIS — J45.51 SEVERE PERSISTENT ASTHMA WITH ACUTE EXACERBATION: ICD-10-CM

## 2023-01-27 DIAGNOSIS — R06.09 DYSPNEA ON EXERTION: ICD-10-CM

## 2023-01-27 DIAGNOSIS — D86.0 PULMONARY SARCOIDOSIS: Primary | ICD-10-CM

## 2023-01-27 PROCEDURE — 99214 OFFICE O/P EST MOD 30 MIN: CPT

## 2023-01-27 RX ORDER — LEVOFLOXACIN 750 MG/1
750 TABLET ORAL DAILY
Qty: 14 TABLET | Refills: 0 | Status: SHIPPED | OUTPATIENT
Start: 2023-01-27 | End: 2023-02-10

## 2023-01-27 RX ORDER — PREDNISONE 10 MG/1
TABLET ORAL
Qty: 45 TABLET | Refills: 0 | Status: SHIPPED | OUTPATIENT
Start: 2023-01-27

## 2023-02-17 LAB — FUNGUS WND CULT: NORMAL

## 2023-03-03 ENCOUNTER — TELEMEDICINE (OUTPATIENT)
Dept: PULMONOLOGY | Facility: CLINIC | Age: 53
End: 2023-03-03
Payer: COMMERCIAL

## 2023-03-03 DIAGNOSIS — R06.09 DYSPNEA ON EXERTION: ICD-10-CM

## 2023-03-03 DIAGNOSIS — J82.83 EOSINOPHILIC ASTHMA: ICD-10-CM

## 2023-03-03 DIAGNOSIS — D86.0 PULMONARY SARCOIDOSIS: ICD-10-CM

## 2023-03-03 DIAGNOSIS — R05.3 CHRONIC COUGH: ICD-10-CM

## 2023-03-03 DIAGNOSIS — J45.50 SEVERE PERSISTENT ASTHMA, UNSPECIFIED WHETHER COMPLICATED: Primary | ICD-10-CM

## 2023-03-03 LAB
MYCOBACTERIUM SPEC CULT: NORMAL
NIGHT BLUE STAIN TISS: NORMAL
NIGHT BLUE STAIN TISS: NORMAL

## 2023-03-03 PROCEDURE — 99214 OFFICE O/P EST MOD 30 MIN: CPT | Performed by: NURSE PRACTITIONER

## 2023-03-03 RX ORDER — BUDESONIDE AND FORMOTEROL FUMARATE DIHYDRATE 160; 4.5 UG/1; UG/1
2 AEROSOL RESPIRATORY (INHALATION)
Qty: 3 EACH | Refills: 3 | Status: SHIPPED | OUTPATIENT
Start: 2023-03-03

## 2023-03-03 RX ORDER — BENZONATATE 200 MG/1
200 CAPSULE ORAL 3 TIMES DAILY PRN
COMMUNITY
Start: 2023-02-24

## 2023-03-03 RX ORDER — DEXTROMETHORPHAN HYDROBROMIDE AND PROMETHAZINE HYDROCHLORIDE 15; 6.25 MG/5ML; MG/5ML
SYRUP ORAL
COMMUNITY
Start: 2023-02-24

## 2023-04-03 DIAGNOSIS — J45.50 SEVERE PERSISTENT ASTHMA, UNSPECIFIED WHETHER COMPLICATED: ICD-10-CM

## 2023-04-03 DIAGNOSIS — D86.0 PULMONARY SARCOIDOSIS: ICD-10-CM

## 2023-04-03 DIAGNOSIS — J82.83 EOSINOPHILIC ASTHMA: Primary | ICD-10-CM

## 2023-04-03 RX ORDER — BENRALIZUMAB 30 MG/ML
30 INJECTION, SOLUTION SUBCUTANEOUS
Qty: 1 ML | Refills: 6 | Status: SHIPPED | OUTPATIENT
Start: 2023-04-03

## 2023-04-27 RX ORDER — PANTOPRAZOLE SODIUM 40 MG/1
TABLET, DELAYED RELEASE ORAL
Qty: 30 TABLET | Refills: 3 | Status: ON HOLD | OUTPATIENT
Start: 2023-04-27 | End: 2023-04-30

## 2023-04-29 ENCOUNTER — HOSPITAL ENCOUNTER (INPATIENT)
Facility: HOSPITAL | Age: 53
LOS: 4 days | Discharge: HOME | End: 2023-05-03
Attending: FAMILY MEDICINE | Admitting: FAMILY MEDICINE
Payer: COMMERCIAL

## 2023-04-29 DIAGNOSIS — D86.0 PULMONARY SARCOIDOSIS: ICD-10-CM

## 2023-04-29 DIAGNOSIS — J82.83 EOSINOPHILIC ASTHMA: ICD-10-CM

## 2023-04-29 DIAGNOSIS — R91.8 PULMONARY INFILTRATE: ICD-10-CM

## 2023-04-29 DIAGNOSIS — M25.561 RIGHT KNEE PAIN, UNSPECIFIED CHRONICITY: ICD-10-CM

## 2023-04-29 DIAGNOSIS — M25.361 PATELLAR INSTABILITY OF RIGHT KNEE: ICD-10-CM

## 2023-04-29 DIAGNOSIS — J18.9 PNEUMONIA OF BOTH LUNGS DUE TO INFECTIOUS ORGANISM, UNSPECIFIED PART OF LUNG: Primary | ICD-10-CM

## 2023-04-30 PROBLEM — J18.9 PNEUMONIA: Status: ACTIVE | Noted: 2023-04-30

## 2023-04-30 LAB
ANION GAP SERPL CALCULATED.3IONS-SCNC: 12.5 MMOL/L (ref 5–15)
BASOPHILS # BLD AUTO: 0 10*3/MM3 (ref 0–0.2)
BASOPHILS NFR BLD AUTO: 0 % (ref 0–1.5)
BUN SERPL-MCNC: 9 MG/DL (ref 6–20)
BUN/CREAT SERPL: 12 (ref 7–25)
CALCIUM SPEC-SCNC: 9.1 MG/DL (ref 8.6–10.5)
CHLORIDE SERPL-SCNC: 99 MMOL/L (ref 98–107)
CO2 SERPL-SCNC: 26.5 MMOL/L (ref 22–29)
CREAT SERPL-MCNC: 0.75 MG/DL (ref 0.57–1)
DEPRECATED RDW RBC AUTO: 38 FL (ref 37–54)
EGFRCR SERPLBLD CKD-EPI 2021: 94.7 ML/MIN/1.73
EOSINOPHIL # BLD AUTO: 0 10*3/MM3 (ref 0–0.4)
EOSINOPHIL NFR BLD AUTO: 0 % (ref 0.3–6.2)
ERYTHROCYTE [DISTWIDTH] IN BLOOD BY AUTOMATED COUNT: 12.4 % (ref 12.3–15.4)
GLUCOSE SERPL-MCNC: 151 MG/DL (ref 65–99)
HCT VFR BLD AUTO: 37.7 % (ref 34–46.6)
HGB BLD-MCNC: 12.6 G/DL (ref 12–15.9)
IMM GRANULOCYTES # BLD AUTO: 0.06 10*3/MM3 (ref 0–0.05)
IMM GRANULOCYTES NFR BLD AUTO: 0.9 % (ref 0–0.5)
L PNEUMO1 AG UR QL IA: NEGATIVE
LYMPHOCYTES # BLD AUTO: 0.28 10*3/MM3 (ref 0.7–3.1)
LYMPHOCYTES NFR BLD AUTO: 4.1 % (ref 19.6–45.3)
M PNEUMO IGM SER QL: POSITIVE
MAGNESIUM SERPL-MCNC: 1.9 MG/DL (ref 1.6–2.6)
MCH RBC QN AUTO: 28.3 PG (ref 26.6–33)
MCHC RBC AUTO-ENTMCNC: 33.4 G/DL (ref 31.5–35.7)
MCV RBC AUTO: 84.5 FL (ref 79–97)
MONOCYTES # BLD AUTO: 0.08 10*3/MM3 (ref 0.1–0.9)
MONOCYTES NFR BLD AUTO: 1.2 % (ref 5–12)
NEUTROPHILS NFR BLD AUTO: 6.4 10*3/MM3 (ref 1.7–7)
NEUTROPHILS NFR BLD AUTO: 93.8 % (ref 42.7–76)
NRBC BLD AUTO-RTO: 0 /100 WBC (ref 0–0.2)
NT-PROBNP SERPL-MCNC: 414.7 PG/ML (ref 0–900)
PLATELET # BLD AUTO: 225 10*3/MM3 (ref 140–450)
PMV BLD AUTO: 10.4 FL (ref 6–12)
POTASSIUM SERPL-SCNC: 3.3 MMOL/L (ref 3.5–5.2)
PROCALCITONIN SERPL-MCNC: 0.1 NG/ML (ref 0–0.25)
RBC # BLD AUTO: 4.46 10*6/MM3 (ref 3.77–5.28)
S PNEUM AG SPEC QL LA: NEGATIVE
SODIUM SERPL-SCNC: 138 MMOL/L (ref 136–145)
WBC NRBC COR # BLD: 6.82 10*3/MM3 (ref 3.4–10.8)

## 2023-04-30 PROCEDURE — 25010000002 AZITHROMYCIN PER 500 MG: Performed by: FAMILY MEDICINE

## 2023-04-30 PROCEDURE — 25010000002 METHYLPREDNISOLONE PER 40 MG: Performed by: FAMILY MEDICINE

## 2023-04-30 PROCEDURE — 25010000002 CEFTRIAXONE PER 250 MG: Performed by: FAMILY MEDICINE

## 2023-04-30 PROCEDURE — 25010000002 HEPARIN (PORCINE) PER 1000 UNITS: Performed by: FAMILY MEDICINE

## 2023-04-30 PROCEDURE — 94799 UNLISTED PULMONARY SVC/PX: CPT

## 2023-04-30 PROCEDURE — 87899 AGENT NOS ASSAY W/OPTIC: CPT | Performed by: FAMILY MEDICINE

## 2023-04-30 PROCEDURE — 86738 MYCOPLASMA ANTIBODY: CPT | Performed by: FAMILY MEDICINE

## 2023-04-30 PROCEDURE — 87449 NOS EACH ORGANISM AG IA: CPT | Performed by: FAMILY MEDICINE

## 2023-04-30 PROCEDURE — 99222 1ST HOSP IP/OBS MODERATE 55: CPT | Performed by: FAMILY MEDICINE

## 2023-04-30 PROCEDURE — 94664 DEMO&/EVAL PT USE INHALER: CPT

## 2023-04-30 PROCEDURE — 84145 PROCALCITONIN (PCT): CPT | Performed by: FAMILY MEDICINE

## 2023-04-30 PROCEDURE — 85025 COMPLETE CBC W/AUTO DIFF WBC: CPT | Performed by: FAMILY MEDICINE

## 2023-04-30 PROCEDURE — 87040 BLOOD CULTURE FOR BACTERIA: CPT | Performed by: FAMILY MEDICINE

## 2023-04-30 PROCEDURE — 83880 ASSAY OF NATRIURETIC PEPTIDE: CPT | Performed by: FAMILY MEDICINE

## 2023-04-30 PROCEDURE — 63710000001 REVEFENACIN 175 MCG/3ML SOLUTION: Performed by: FAMILY MEDICINE

## 2023-04-30 PROCEDURE — 94640 AIRWAY INHALATION TREATMENT: CPT

## 2023-04-30 PROCEDURE — 80048 BASIC METABOLIC PNL TOTAL CA: CPT | Performed by: FAMILY MEDICINE

## 2023-04-30 PROCEDURE — 83735 ASSAY OF MAGNESIUM: CPT | Performed by: FAMILY MEDICINE

## 2023-04-30 RX ORDER — AMITRIPTYLINE HYDROCHLORIDE 100 MG/1
100 TABLET, FILM COATED ORAL NIGHTLY
Status: DISCONTINUED | OUTPATIENT
Start: 2023-04-30 | End: 2023-05-03

## 2023-04-30 RX ORDER — AZITHROMYCIN 250 MG/1
500 TABLET, FILM COATED ORAL NIGHTLY
Status: COMPLETED | OUTPATIENT
Start: 2023-04-30 | End: 2023-05-01

## 2023-04-30 RX ORDER — SODIUM CHLORIDE 9 MG/ML
40 INJECTION, SOLUTION INTRAVENOUS AS NEEDED
Status: DISCONTINUED | OUTPATIENT
Start: 2023-04-30 | End: 2023-05-03 | Stop reason: HOSPADM

## 2023-04-30 RX ORDER — METHYLPREDNISOLONE SODIUM SUCCINATE 40 MG/ML
40 INJECTION, POWDER, LYOPHILIZED, FOR SOLUTION INTRAMUSCULAR; INTRAVENOUS EVERY 8 HOURS
Status: DISCONTINUED | OUTPATIENT
Start: 2023-04-30 | End: 2023-05-03 | Stop reason: HOSPADM

## 2023-04-30 RX ORDER — GUAIFENESIN AND CODEINE PHOSPHATE 100; 10 MG/5ML; MG/5ML
5 SOLUTION ORAL EVERY 4 HOURS PRN
Status: DISCONTINUED | OUTPATIENT
Start: 2023-04-30 | End: 2023-05-03 | Stop reason: HOSPADM

## 2023-04-30 RX ORDER — LAMOTRIGINE 100 MG/1
200 TABLET ORAL DAILY
Status: DISCONTINUED | OUTPATIENT
Start: 2023-04-30 | End: 2023-05-03

## 2023-04-30 RX ORDER — FUROSEMIDE 20 MG/1
20 TABLET ORAL DAILY
Status: DISCONTINUED | OUTPATIENT
Start: 2023-04-30 | End: 2023-05-03 | Stop reason: HOSPADM

## 2023-04-30 RX ORDER — BUDESONIDE 0.5 MG/2ML
0.5 INHALANT ORAL
Status: DISCONTINUED | OUTPATIENT
Start: 2023-04-30 | End: 2023-05-03 | Stop reason: HOSPADM

## 2023-04-30 RX ORDER — ARFORMOTEROL TARTRATE 15 UG/2ML
15 SOLUTION RESPIRATORY (INHALATION)
Status: DISCONTINUED | OUTPATIENT
Start: 2023-04-30 | End: 2023-05-03 | Stop reason: HOSPADM

## 2023-04-30 RX ORDER — PANTOPRAZOLE SODIUM 40 MG/1
40 TABLET, DELAYED RELEASE ORAL DAILY
COMMUNITY

## 2023-04-30 RX ORDER — SODIUM CHLORIDE 9 MG/ML
100 INJECTION, SOLUTION INTRAVENOUS CONTINUOUS
Status: DISCONTINUED | OUTPATIENT
Start: 2023-04-30 | End: 2023-04-30

## 2023-04-30 RX ORDER — ALBUTEROL SULFATE 2.5 MG/3ML
2.5 SOLUTION RESPIRATORY (INHALATION)
Status: DISCONTINUED | OUTPATIENT
Start: 2023-04-30 | End: 2023-04-30

## 2023-04-30 RX ORDER — LEVALBUTEROL INHALATION SOLUTION 1.25 MG/3ML
1.25 SOLUTION RESPIRATORY (INHALATION) EVERY 4 HOURS PRN
Status: DISCONTINUED | OUTPATIENT
Start: 2023-04-30 | End: 2023-05-03 | Stop reason: HOSPADM

## 2023-04-30 RX ORDER — NITROGLYCERIN 0.4 MG/1
0.4 TABLET SUBLINGUAL
Status: DISCONTINUED | OUTPATIENT
Start: 2023-04-30 | End: 2023-05-03 | Stop reason: HOSPADM

## 2023-04-30 RX ORDER — LEVALBUTEROL INHALATION SOLUTION 1.25 MG/3ML
1.25 SOLUTION RESPIRATORY (INHALATION)
Status: DISCONTINUED | OUTPATIENT
Start: 2023-04-30 | End: 2023-05-03 | Stop reason: HOSPADM

## 2023-04-30 RX ORDER — SODIUM CHLORIDE 0.9 % (FLUSH) 0.9 %
10 SYRINGE (ML) INJECTION AS NEEDED
Status: DISCONTINUED | OUTPATIENT
Start: 2023-04-30 | End: 2023-05-03 | Stop reason: HOSPADM

## 2023-04-30 RX ORDER — TIZANIDINE 4 MG/1
8 TABLET ORAL EVERY 8 HOURS PRN
Status: DISCONTINUED | OUTPATIENT
Start: 2023-04-30 | End: 2023-05-03 | Stop reason: HOSPADM

## 2023-04-30 RX ORDER — PANTOPRAZOLE SODIUM 40 MG/1
40 TABLET, DELAYED RELEASE ORAL EVERY MORNING
Status: DISCONTINUED | OUTPATIENT
Start: 2023-04-30 | End: 2023-05-03 | Stop reason: HOSPADM

## 2023-04-30 RX ORDER — HYDROCODONE BITARTRATE AND ACETAMINOPHEN 5; 325 MG/1; MG/1
1 TABLET ORAL EVERY 4 HOURS PRN
Status: DISCONTINUED | OUTPATIENT
Start: 2023-04-30 | End: 2023-04-30

## 2023-04-30 RX ORDER — ONDANSETRON 2 MG/ML
4 INJECTION INTRAMUSCULAR; INTRAVENOUS EVERY 6 HOURS PRN
Status: DISCONTINUED | OUTPATIENT
Start: 2023-04-30 | End: 2023-05-03 | Stop reason: HOSPADM

## 2023-04-30 RX ORDER — BUPRENORPHINE HYDROCHLORIDE 8 MG/1
24 TABLET SUBLINGUAL DAILY
Status: DISCONTINUED | OUTPATIENT
Start: 2023-04-30 | End: 2023-05-03 | Stop reason: HOSPADM

## 2023-04-30 RX ORDER — CLONAZEPAM 0.5 MG/1
1 TABLET ORAL 2 TIMES DAILY PRN
Status: DISCONTINUED | OUTPATIENT
Start: 2023-04-30 | End: 2023-05-03 | Stop reason: HOSPADM

## 2023-04-30 RX ORDER — SODIUM CHLORIDE 0.9 % (FLUSH) 0.9 %
10 SYRINGE (ML) INJECTION EVERY 12 HOURS SCHEDULED
Status: DISCONTINUED | OUTPATIENT
Start: 2023-04-30 | End: 2023-05-03 | Stop reason: HOSPADM

## 2023-04-30 RX ORDER — BENZONATATE 100 MG/1
200 CAPSULE ORAL 3 TIMES DAILY PRN
Status: DISCONTINUED | OUTPATIENT
Start: 2023-04-30 | End: 2023-05-03 | Stop reason: HOSPADM

## 2023-04-30 RX ORDER — LEVOTHYROXINE SODIUM 0.1 MG/1
100 TABLET ORAL
Status: DISCONTINUED | OUTPATIENT
Start: 2023-05-01 | End: 2023-05-03 | Stop reason: HOSPADM

## 2023-04-30 RX ORDER — POTASSIUM CHLORIDE 750 MG/1
40 CAPSULE, EXTENDED RELEASE ORAL
Status: COMPLETED | OUTPATIENT
Start: 2023-04-30 | End: 2023-04-30

## 2023-04-30 RX ORDER — ONDANSETRON 4 MG/1
4 TABLET, FILM COATED ORAL EVERY 6 HOURS PRN
Status: DISCONTINUED | OUTPATIENT
Start: 2023-04-30 | End: 2023-05-03 | Stop reason: HOSPADM

## 2023-04-30 RX ORDER — HEPARIN SODIUM 5000 [USP'U]/ML
5000 INJECTION, SOLUTION INTRAVENOUS; SUBCUTANEOUS EVERY 8 HOURS SCHEDULED
Status: DISCONTINUED | OUTPATIENT
Start: 2023-04-30 | End: 2023-05-03 | Stop reason: HOSPADM

## 2023-04-30 RX ORDER — HYDROCODONE POLISTIREX AND CHLORPHENIRAMINE POLISTIREX 10; 8 MG/5ML; MG/5ML
5 SUSPENSION, EXTENDED RELEASE ORAL EVERY 12 HOURS PRN
Status: DISCONTINUED | OUTPATIENT
Start: 2023-04-30 | End: 2023-04-30

## 2023-04-30 RX ORDER — CEFTRIAXONE SODIUM 1 G/50ML
1 INJECTION, SOLUTION INTRAVENOUS EVERY 24 HOURS
Status: DISCONTINUED | OUTPATIENT
Start: 2023-04-30 | End: 2023-05-03

## 2023-04-30 RX ORDER — ONDANSETRON 8 MG/1
8 TABLET, ORALLY DISINTEGRATING ORAL EVERY 12 HOURS PRN
COMMUNITY
Start: 2023-03-06

## 2023-04-30 RX ORDER — CETIRIZINE HYDROCHLORIDE 10 MG/1
10 TABLET ORAL DAILY
Status: DISCONTINUED | OUTPATIENT
Start: 2023-04-30 | End: 2023-05-03 | Stop reason: HOSPADM

## 2023-04-30 RX ORDER — ACETAMINOPHEN 325 MG/1
650 TABLET ORAL EVERY 4 HOURS PRN
Status: DISCONTINUED | OUTPATIENT
Start: 2023-04-30 | End: 2023-05-03 | Stop reason: HOSPADM

## 2023-04-30 RX ADMIN — PANTOPRAZOLE SODIUM 40 MG: 40 TABLET, DELAYED RELEASE ORAL at 11:25

## 2023-04-30 RX ADMIN — GUAIFENESIN AND CODEINE PHOSPHATE 5 ML: 10; 100 LIQUID ORAL at 21:01

## 2023-04-30 RX ADMIN — SODIUM CHLORIDE 100 ML/HR: 9 INJECTION, SOLUTION INTRAVENOUS at 00:58

## 2023-04-30 RX ADMIN — ALBUTEROL SULFATE 2.5 MG: 2.5 SOLUTION RESPIRATORY (INHALATION) at 01:00

## 2023-04-30 RX ADMIN — POTASSIUM CHLORIDE 40 MEQ: 10 CAPSULE, COATED, EXTENDED RELEASE ORAL at 18:00

## 2023-04-30 RX ADMIN — HEPARIN SODIUM 5000 UNITS: 5000 INJECTION INTRAVENOUS; SUBCUTANEOUS at 06:01

## 2023-04-30 RX ADMIN — FUROSEMIDE 20 MG: 20 TABLET ORAL at 11:25

## 2023-04-30 RX ADMIN — AMITRIPTYLINE HYDROCHLORIDE 100 MG: 100 TABLET, FILM COATED ORAL at 21:29

## 2023-04-30 RX ADMIN — ARFORMOTEROL TARTRATE 15 MCG: 15 SOLUTION RESPIRATORY (INHALATION) at 18:58

## 2023-04-30 RX ADMIN — BUDESONIDE 0.5 MG: 0.5 SUSPENSION RESPIRATORY (INHALATION) at 18:58

## 2023-04-30 RX ADMIN — METHYLPREDNISOLONE SODIUM SUCCINATE 40 MG: 40 INJECTION INTRAMUSCULAR; INTRAVENOUS at 06:02

## 2023-04-30 RX ADMIN — CLONAZEPAM 1 MG: 0.5 TABLET ORAL at 12:40

## 2023-04-30 RX ADMIN — BUPRENORPHINE 24 MG: 8 TABLET SUBLINGUAL at 11:25

## 2023-04-30 RX ADMIN — HYDROCODONE POLISTIREX AND CHLORPHENIRAMINE POLISTIREX 5 ML: 10; 8 SUSPENSION, EXTENDED RELEASE ORAL at 02:45

## 2023-04-30 RX ADMIN — METHYLPREDNISOLONE SODIUM SUCCINATE 40 MG: 40 INJECTION INTRAMUSCULAR; INTRAVENOUS at 23:25

## 2023-04-30 RX ADMIN — LEVALBUTEROL HYDROCHLORIDE 1.25 MG: 1.25 SOLUTION RESPIRATORY (INHALATION) at 18:58

## 2023-04-30 RX ADMIN — BUDESONIDE 0.5 MG: 0.5 SUSPENSION RESPIRATORY (INHALATION) at 11:36

## 2023-04-30 RX ADMIN — AZITHROMYCIN 500 MG: 500 INJECTION, POWDER, LYOPHILIZED, FOR SOLUTION INTRAVENOUS at 02:11

## 2023-04-30 RX ADMIN — Medication 10 ML: at 01:00

## 2023-04-30 RX ADMIN — LEVALBUTEROL HYDROCHLORIDE 1.25 MG: 1.25 SOLUTION RESPIRATORY (INHALATION) at 11:36

## 2023-04-30 RX ADMIN — LAMOTRIGINE 200 MG: 100 TABLET ORAL at 11:24

## 2023-04-30 RX ADMIN — Medication 10 ML: at 21:01

## 2023-04-30 RX ADMIN — CLONAZEPAM 1 MG: 0.5 TABLET ORAL at 21:29

## 2023-04-30 RX ADMIN — AZITHROMYCIN MONOHYDRATE 500 MG: 250 TABLET ORAL at 21:01

## 2023-04-30 RX ADMIN — ALBUTEROL SULFATE 2.5 MG: 2.5 SOLUTION RESPIRATORY (INHALATION) at 07:21

## 2023-04-30 RX ADMIN — REVEFENACIN 175 MCG: 175 SOLUTION RESPIRATORY (INHALATION) at 11:36

## 2023-04-30 RX ADMIN — POTASSIUM CHLORIDE 40 MEQ: 10 CAPSULE, COATED, EXTENDED RELEASE ORAL at 11:25

## 2023-04-30 RX ADMIN — GUAIFENESIN AND CODEINE PHOSPHATE 5 ML: 10; 100 LIQUID ORAL at 15:35

## 2023-04-30 RX ADMIN — METHYLPREDNISOLONE SODIUM SUCCINATE 40 MG: 40 INJECTION INTRAMUSCULAR; INTRAVENOUS at 14:00

## 2023-04-30 RX ADMIN — CETIRIZINE HYDROCHLORIDE 10 MG: 10 TABLET, FILM COATED ORAL at 11:25

## 2023-04-30 RX ADMIN — CEFTRIAXONE SODIUM 1 G: 1 INJECTION, SOLUTION INTRAVENOUS at 01:32

## 2023-04-30 RX ADMIN — HEPARIN SODIUM 5000 UNITS: 5000 INJECTION INTRAVENOUS; SUBCUTANEOUS at 21:01

## 2023-04-30 RX ADMIN — ARFORMOTEROL TARTRATE 15 MCG: 15 SOLUTION RESPIRATORY (INHALATION) at 11:36

## 2023-04-30 RX ADMIN — HEPARIN SODIUM 5000 UNITS: 5000 INJECTION INTRAVENOUS; SUBCUTANEOUS at 13:58

## 2023-04-30 NOTE — PROGRESS NOTES
Respiratory Therapist Broncho-Pulmonary Hygiene Progress Note      Patient Name:  Lamar Vee  YOB: 1970    Lamar Vee meets the qualification for Level 2 of the Bronco-Pulmonary Hygiene Protocol. This was based on my daily patient assessment and includes review of chest x-ray results, cough ability and quality, oxygenation, secretions or risk for secretion development and patient mobility.     Broncho-Pulmonary Hygiene Assessment:    Level of Movement: Actively changing positions without assistance  Alert/ oriented/ cooperative    Breath Sounds: Diminished and/or coarse rhonchi    Cough: Strong, effective    Chest X-Ray: No CXR available    Sputum Productions: None or small amount of thin or watery secretions with effective cough    History and Physical: Chronic condition    SpO2 to Oxygen Need: greater than 92% on room air or  less than 3L nasal canula    Current SpO2 is: 93% on 3L    Based on this information, I have completed the following interventions: Aerobika with bronchodialtor medication or TID      Electronically signed by Justyna Guadalupe RRT, 04/30/23, 12:37 PM EDT.

## 2023-04-30 NOTE — CASE MANAGEMENT/SOCIAL WORK
Discharge Planning Assessment   [place]     Patient Name: Lamar Vee  MRN: [id number]  Today's Date: 4/30/2023    Admit Date: 4/29/2023    Plan: Pt lives at home with adult son and his girlfriend. 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 Pt denies any issues with affording food, utilities or housing. Pt did state that she did have to stop taking a medication due to cost. PCP:Willie. Pt plans to return home at [facility] with no needs. Denies need for DME. SW/CM will continue to follow for needs.   Discharge Needs Assessment     Row Name 04/30/23 1022       Living Environment    People in Home child(cedric), adult    Current Living Arrangements home    Duration at Residence 10 years    Potentially Unsafe Housing Conditions none    Primary Care Provided by self    Provides Primary Care For no one    Family Caregiver if Needed child(cedric), adult;parent(s)    Quality of Family Relationships helpful;involved;supportive       Food Insecurity    Within the past 12 months, you worried that your food would run out before you got the money to buy more. Never true    Within the past 12 months, the food you bought just didn't last and you didn't have money to get more. Never true       Transition Planning    Patient/Family Anticipates Transition to home with family    Patient/Family Anticipated Services at Transition none    Transportation Anticipated family or friend will provide       Discharge Needs Assessment    Equipment Currently Used at Home none    Concerns to be Addressed no discharge needs identified    Equipment Needed After Discharge none    Discharge Coordination/Progress Pt lives at home with adult son and his girlfriend. 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 Pt denies any issues with affording food, utilities or housing. Pt did state that she did have to stop taking a medication due to cost. PCP:Willie. Pt plans to  return home at [facility] with no needs. Denies need for DME. SW/CM will continue to follow for needs.               Discharge Plan     Row Name 04/30/23 1027       Plan    Plan Pt lives at home with adult son and his girlfriend. 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 Pt denies any issues with affording food, utilities or housing. Pt did state that she did have to stop taking a medication due to cost. PCP:Willie. Pt plans to return home at [facility] with no needs. Denies need for DME. SW/CM will continue to follow for needs.              Continued Care and Services - Admitted Since 4/29/2023    Coordination has not been started for this encounter.          Demographic Summary     Row Name 04/30/23 1019       General Information    Admission Type inpatient    Arrived From emergency department    Referral Source admission list    Reason for Consult discharge planning    Preferred Language English       Contact Information    Permission Granted to Share Info With lay caregiver    Contact Information Obtained for lay caregiver       Lay Caregiver Information    Name, Lay Caregiver Leigh Cain    Phone, Lay Caregiver [phone]               Functional Status     Row Name 04/30/23 1020       Functional Status    Usual Activity Tolerance excellent    Current Activity Tolerance excellent       Physical Activity    On average, how many days per week do you engage in moderate to strenuous exercise (like a brisk walk)? 0 days    On average, how many minutes do you engage in exercise at this level? 0 min    Number of minutes of exercise per week 0       Assessment of Health Literacy    How often do you have someone help you read hospital materials? Never    How often do you have problems learning about your medical condition because of difficulty understanding written information? Never    How often do you have a problem understanding what is told to you about your medical  condition? Never    How confident are you filling out medical forms by yourself? Extremely    Health Literacy Excellent       Functional Status, IADL    Medications independent    Meal Preparation independent    Housekeeping independent    Laundry independent    Shopping independent       Mental Status    General Appearance WDL WDL       Mental Status Summary    Recent Changes in Mental Status/Cognitive Functioning no changes       Employment/    Employment Status employed full-time    Shift Worked first shift    Current or Previous Occupation healthcare               Psychosocial    No documentation.                Abuse/Neglect    No documentation.                Legal     Row Name 04/30/23 1021       Financial Resource Strain    How hard is it for you to pay for the very basics like food, housing, medical care, and heating? Not hard       Financial/Legal    Source of Income salary/wages    Application for Public Assistance not applied       Legal    Criminal Activity/Legal Involvement none               Substance Abuse    No documentation.                Patient Forms    No documentation.                   Catalina Dangelo

## 2023-04-30 NOTE — PLAN OF CARE
Goal Outcome Evaluation:         VSS. No complaints of pain. Patient still has congested cough, unable to cough up sputum for sample, speci cup still at bedside. Will continue with POC.

## 2023-04-30 NOTE — PLAN OF CARE
Goal Outcome Evaluation:   Pt admitted this shift from [facility] for pneumonia. Currently on 3LNC. Fluid and abx started. Will continue current plan of care.

## 2023-04-30 NOTE — H&P
[facility]   HISTORY AND PHYSICAL    Patient Name: Lamar Vee  : 1970  MRN: [id number]  Primary Care Physician:  Everett Tapia MD  Date of admission: 2023    Subjective   Subjective     Chief Complaint: Shortness of breath cough chest congestion    History of Present Illness  Patient is a 52-year-old female with past medical history of hypertension, sarcoidosis, asthma, seasonal allergies, and history of substance abuse who presented to the emergency department in [facility].  She had had shortness of breath cough and chest congestion x3 days.  She says that this has only gotten worse each day.  Shortness of breath worse with exertion.  They are in the emergency department patient was COVID-negative but did have bilateral pneumonia.  She was quite hypoxic as well with an O2 saturation of 86% on room air.  The patient's ABG showed a PaO2 of 51.  Review of Systems   Respiratory: Positive for cough, chest tightness and shortness of breath.         Personal History     Past Medical History:   Diagnosis Date   • Anxiety and depression    • Asthma    • Disease of thyroid gland    • H/O: substance abuse     opiates   • Hypertension    • Melanoma    • Migraine    • Neuropathy    • Sarcoidosis    • Seasonal allergies        Past Surgical History:   Procedure Laterality Date   • APPENDECTOMY     • BACK SURGERY      lumbar   • BRONCHOSCOPY N/A 2023    Procedure: BRONCHOSCOPY WITH BIOPSIES AND BAL;  Surgeon: Victor M Field DO;  Location: [facility] ENDOSCOPY;  Service: Pulmonary;  Laterality: N/A;  SARCOIDOSIS AND RECURRENT LUNG INFECTIONS   • CHOLECYSTECTOMY     • COLONOSCOPY     • FACIAL COSMETIC SURGERY     • HYSTERECTOMY     • SHOULDER SURGERY      spur removal   • TONSILLECTOMY AND ADENOIDECTOMY     • WISDOM TOOTH EXTRACTION         Family History: family history includes Cancer in her maternal aunt and mother; Heart disease in her maternal aunt. Otherwise pertinent  FHx was reviewed and not pertinent to current issue.    Social History:  reports that she has never smoked. She has never used smokeless tobacco. She reports that she does not currently use alcohol. She reports current drug use.    Home Medications:  Benralizumab, Rimegepant Sulfate, albuterol, albuterol sulfate HFA, amitriptyline, benzonatate, buPROPion XL, budesonide-formoterol, buprenorphine, cetirizine, clonazePAM, fluticasone, furosemide, ibuprofen, ipratropium-albuterol, lamoTRIgine, levothyroxine, linaclotide, montelukast, pantoprazole, predniSONE, promethazine-dextromethorphan, rOPINIRole, and tiZANidine    Allergies:  Allergies   Allergen Reactions   • Morphine Headache   • Naloxone    • Pentazocine Unknown - Low Severity   • Tetanus Toxoids    • Toradol [Ketorolac Tromethamine]    • Tyloxapol        Objective    Objective     Vitals:   Temp:  [97.4 °F (36.3 °C)] 97.4 °F (36.3 °C)  Heart Rate:  [91] 91  Resp:  [20] 20  BP: (127)/(70) 127/70  Flow (L/min):  [3] 3    Physical Exam  Constitutional:  Well-developed and well-nourished.  No acute distress.      HENT:  Head:  Normocephalic and atraumatic.  Mouth:  Moist mucous membranes.    Eyes:  Conjunctivae and EOM are normal. No scleral icterus.    Neck:  Neck supple.  No JVD present.    Cardiovascular:  Normal rate, regular rhythm and normal heart sounds with no murmur.  Pulmonary/Chest:  No respiratory distress, no wheezes, no crackles, with normal breath sounds and good air movement.  Abdominal:  Soft. No distension and no tenderness.   Musculoskeletal:  No tenderness, and no deformity.  No red or swollen joints anywhere.    Neurological:  Alert and oriented to person, place, and time.  No cranial nerve deficit.    Skin:  Skin is warm and dry. No rash noted. No pallor.   Peripheral vascular:  No clubbing, no cyanosis, no edema.  Psychiatric: Appropriate mood and affect  :   Result Review    Result Review:  I have personally reviewed the results from the  time of this admission to 4/30/2023 00:27 EDT and agree with these findings:  []  Laboratory list / accordion  []  Microbiology  []  Radiology  []  EKG/Telemetry   []  Cardiology/Vascular   []  Pathology  []  Old records  []  Other:  Most notable findings include:      Assessment & Plan   Assessment / Plan     Brief Patient Summary:  Lamar Vee is a 52 y.o. female who presented to an outside emergency department with cough and chest congestion x3 days.  She was found to have bilateral pneumonia.  Patient sees pulmonology here and was therefore transferred for higher level care.    Active Hospital Problems:  1.  Bilateral pneumonia  2.  Acute hypoxic respiratory failure  3.  History of sarcoidosis  4.  History of asthma  5.  History of seasonal allergies    Plan:   Patient will be started on the pneumonia pathway.  Patient will be given IV antibiotics and nebulizer treatments.  We will support with O2 via nasal cannula.  Patient will be kept on telemetry and continuous pulse oximetry given how low her oxygen dropped at the outside facility.  We will be checking for strep pneumo, mycoplasma and Legionella  I will also add Tussionex for cough.  Despite the patient taking buprenorphine for drug abuse she has taken sporadic opiates over time without problem  Consider pulmonology consult if patient's condition does not improve clinically in short order    DVT prophylaxis:  Medical DVT prophylaxis orders are present.    CODE STATUS:    Code Status (Patient has no pulse and is not breathing): CPR (Attempt to Resuscitate)  Medical Interventions (Patient has pulse or is breathing): Full Support    Admission Status:  I believe this patient meets inpatient status.    Timothy Palmer,

## 2023-05-01 LAB
ANION GAP SERPL CALCULATED.3IONS-SCNC: 9.7 MMOL/L (ref 5–15)
B PARAPERT DNA SPEC QL NAA+PROBE: NOT DETECTED
B PERT DNA SPEC QL NAA+PROBE: NOT DETECTED
BASOPHILS # BLD AUTO: 0.01 10*3/MM3 (ref 0–0.2)
BASOPHILS NFR BLD AUTO: 0.1 % (ref 0–1.5)
BUN SERPL-MCNC: 12 MG/DL (ref 6–20)
BUN/CREAT SERPL: 17.6 (ref 7–25)
C PNEUM DNA NPH QL NAA+NON-PROBE: NOT DETECTED
CALCIUM SPEC-SCNC: 8.9 MG/DL (ref 8.6–10.5)
CHLORIDE SERPL-SCNC: 104 MMOL/L (ref 98–107)
CO2 SERPL-SCNC: 26.3 MMOL/L (ref 22–29)
CREAT SERPL-MCNC: 0.68 MG/DL (ref 0.57–1)
DEPRECATED RDW RBC AUTO: 39.7 FL (ref 37–54)
EGFRCR SERPLBLD CKD-EPI 2021: 103.6 ML/MIN/1.73
EOSINOPHIL # BLD AUTO: 0 10*3/MM3 (ref 0–0.4)
EOSINOPHIL NFR BLD AUTO: 0 % (ref 0.3–6.2)
ERYTHROCYTE [DISTWIDTH] IN BLOOD BY AUTOMATED COUNT: 12.7 % (ref 12.3–15.4)
FLUBV RNA ISLT QL NAA+PROBE: NOT DETECTED
GLUCOSE BLDC GLUCOMTR-MCNC: 268 MG/DL (ref 70–99)
GLUCOSE SERPL-MCNC: 126 MG/DL (ref 65–99)
HADV DNA SPEC NAA+PROBE: NOT DETECTED
HCOV 229E RNA SPEC QL NAA+PROBE: NOT DETECTED
HCOV HKU1 RNA SPEC QL NAA+PROBE: NOT DETECTED
HCOV NL63 RNA SPEC QL NAA+PROBE: NOT DETECTED
HCOV OC43 RNA SPEC QL NAA+PROBE: NOT DETECTED
HCT VFR BLD AUTO: 34.8 % (ref 34–46.6)
HGB BLD-MCNC: 11.4 G/DL (ref 12–15.9)
HMPV RNA NPH QL NAA+NON-PROBE: NOT DETECTED
HPIV1 RNA ISLT QL NAA+PROBE: DETECTED
HPIV2 RNA SPEC QL NAA+PROBE: NOT DETECTED
HPIV3 RNA NPH QL NAA+PROBE: NOT DETECTED
HPIV4 P GENE NPH QL NAA+PROBE: NOT DETECTED
IMM GRANULOCYTES # BLD AUTO: 0.05 10*3/MM3 (ref 0–0.05)
IMM GRANULOCYTES NFR BLD AUTO: 0.6 % (ref 0–0.5)
LYMPHOCYTES # BLD AUTO: 0.39 10*3/MM3 (ref 0.7–3.1)
LYMPHOCYTES NFR BLD AUTO: 4.9 % (ref 19.6–45.3)
M PNEUMO IGG SER IA-ACNC: NOT DETECTED
M PNEUMO IGM SER QL: POSITIVE
MCH RBC QN AUTO: 28.2 PG (ref 26.6–33)
MCHC RBC AUTO-ENTMCNC: 32.8 G/DL (ref 31.5–35.7)
MCV RBC AUTO: 86.1 FL (ref 79–97)
MONOCYTES # BLD AUTO: 0.33 10*3/MM3 (ref 0.1–0.9)
MONOCYTES NFR BLD AUTO: 4.2 % (ref 5–12)
NEUTROPHILS NFR BLD AUTO: 7.1 10*3/MM3 (ref 1.7–7)
NEUTROPHILS NFR BLD AUTO: 90.2 % (ref 42.7–76)
NRBC BLD AUTO-RTO: 0 /100 WBC (ref 0–0.2)
PLATELET # BLD AUTO: 230 10*3/MM3 (ref 140–450)
PMV BLD AUTO: 10.8 FL (ref 6–12)
POTASSIUM SERPL-SCNC: 4.5 MMOL/L (ref 3.5–5.2)
RBC # BLD AUTO: 4.04 10*6/MM3 (ref 3.77–5.28)
RHINOVIRUS RNA SPEC NAA+PROBE: NOT DETECTED
RSV RNA NPH QL NAA+NON-PROBE: NOT DETECTED
SARS-COV-2 RNA RESP QL NAA+PROBE: NOT DETECTED
SODIUM SERPL-SCNC: 140 MMOL/L (ref 136–145)
WBC NRBC COR # BLD: 7.88 10*3/MM3 (ref 3.4–10.8)

## 2023-05-01 PROCEDURE — 63710000001 REVEFENACIN 175 MCG/3ML SOLUTION: Performed by: FAMILY MEDICINE

## 2023-05-01 PROCEDURE — 82948 REAGENT STRIP/BLOOD GLUCOSE: CPT

## 2023-05-01 PROCEDURE — 25010000002 METHYLPREDNISOLONE PER 40 MG: Performed by: FAMILY MEDICINE

## 2023-05-01 PROCEDURE — 99233 SBSQ HOSP IP/OBS HIGH 50: CPT | Performed by: FAMILY MEDICINE

## 2023-05-01 PROCEDURE — 85025 COMPLETE CBC W/AUTO DIFF WBC: CPT | Performed by: FAMILY MEDICINE

## 2023-05-01 PROCEDURE — 99223 1ST HOSP IP/OBS HIGH 75: CPT | Performed by: INTERNAL MEDICINE

## 2023-05-01 PROCEDURE — 94799 UNLISTED PULMONARY SVC/PX: CPT

## 2023-05-01 PROCEDURE — 25010000002 CEFTRIAXONE PER 250 MG: Performed by: FAMILY MEDICINE

## 2023-05-01 PROCEDURE — 25010000002 HEPARIN (PORCINE) PER 1000 UNITS: Performed by: FAMILY MEDICINE

## 2023-05-01 PROCEDURE — 94664 DEMO&/EVAL PT USE INHALER: CPT

## 2023-05-01 PROCEDURE — 86738 MYCOPLASMA ANTIBODY: CPT | Performed by: INTERNAL MEDICINE

## 2023-05-01 PROCEDURE — 80048 BASIC METABOLIC PNL TOTAL CA: CPT | Performed by: FAMILY MEDICINE

## 2023-05-01 PROCEDURE — 0202U NFCT DS 22 TRGT SARS-COV-2: CPT | Performed by: FAMILY MEDICINE

## 2023-05-01 RX ADMIN — GUAIFENESIN AND CODEINE PHOSPHATE 5 ML: 10; 100 LIQUID ORAL at 15:01

## 2023-05-01 RX ADMIN — TIZANIDINE 8 MG: 4 TABLET ORAL at 21:20

## 2023-05-01 RX ADMIN — AMITRIPTYLINE HYDROCHLORIDE 100 MG: 100 TABLET, FILM COATED ORAL at 21:21

## 2023-05-01 RX ADMIN — HEPARIN SODIUM 5000 UNITS: 5000 INJECTION INTRAVENOUS; SUBCUTANEOUS at 13:21

## 2023-05-01 RX ADMIN — AZITHROMYCIN MONOHYDRATE 500 MG: 250 TABLET ORAL at 21:20

## 2023-05-01 RX ADMIN — LAMOTRIGINE 200 MG: 100 TABLET ORAL at 09:23

## 2023-05-01 RX ADMIN — CLONAZEPAM 1 MG: 0.5 TABLET ORAL at 10:19

## 2023-05-01 RX ADMIN — PANTOPRAZOLE SODIUM 40 MG: 40 TABLET, DELAYED RELEASE ORAL at 05:50

## 2023-05-01 RX ADMIN — LEVALBUTEROL HYDROCHLORIDE 1.25 MG: 1.25 SOLUTION RESPIRATORY (INHALATION) at 08:48

## 2023-05-01 RX ADMIN — Medication: at 09:34

## 2023-05-01 RX ADMIN — GUAIFENESIN AND CODEINE PHOSPHATE 5 ML: 10; 100 LIQUID ORAL at 10:19

## 2023-05-01 RX ADMIN — METHYLPREDNISOLONE SODIUM SUCCINATE 40 MG: 40 INJECTION INTRAMUSCULAR; INTRAVENOUS at 17:26

## 2023-05-01 RX ADMIN — CLONAZEPAM 1 MG: 0.5 TABLET ORAL at 21:21

## 2023-05-01 RX ADMIN — HEPARIN SODIUM 5000 UNITS: 5000 INJECTION INTRAVENOUS; SUBCUTANEOUS at 05:50

## 2023-05-01 RX ADMIN — LEVOTHYROXINE SODIUM 100 MCG: 100 TABLET ORAL at 05:50

## 2023-05-01 RX ADMIN — BUPRENORPHINE 24 MG: 8 TABLET SUBLINGUAL at 09:24

## 2023-05-01 RX ADMIN — Medication 10 ML: at 09:34

## 2023-05-01 RX ADMIN — METHYLPREDNISOLONE SODIUM SUCCINATE 40 MG: 40 INJECTION INTRAMUSCULAR; INTRAVENOUS at 22:01

## 2023-05-01 RX ADMIN — REVEFENACIN 175 MCG: 175 SOLUTION RESPIRATORY (INHALATION) at 08:48

## 2023-05-01 RX ADMIN — ARFORMOTEROL TARTRATE 15 MCG: 15 SOLUTION RESPIRATORY (INHALATION) at 08:48

## 2023-05-01 RX ADMIN — GUAIFENESIN AND CODEINE PHOSPHATE 5 ML: 10; 100 LIQUID ORAL at 05:51

## 2023-05-01 RX ADMIN — LEVALBUTEROL HYDROCHLORIDE 1.25 MG: 1.25 SOLUTION RESPIRATORY (INHALATION) at 19:16

## 2023-05-01 RX ADMIN — GUAIFENESIN AND CODEINE PHOSPHATE 5 ML: 10; 100 LIQUID ORAL at 01:31

## 2023-05-01 RX ADMIN — METHYLPREDNISOLONE SODIUM SUCCINATE 40 MG: 40 INJECTION INTRAMUSCULAR; INTRAVENOUS at 05:50

## 2023-05-01 RX ADMIN — GUAIFENESIN AND CODEINE PHOSPHATE 5 ML: 10; 100 LIQUID ORAL at 21:52

## 2023-05-01 RX ADMIN — BUDESONIDE 0.5 MG: 0.5 SUSPENSION RESPIRATORY (INHALATION) at 08:48

## 2023-05-01 RX ADMIN — LEVALBUTEROL HYDROCHLORIDE 1.25 MG: 1.25 SOLUTION RESPIRATORY (INHALATION) at 13:19

## 2023-05-01 RX ADMIN — ARFORMOTEROL TARTRATE 15 MCG: 15 SOLUTION RESPIRATORY (INHALATION) at 19:16

## 2023-05-01 RX ADMIN — CETIRIZINE HYDROCHLORIDE 10 MG: 10 TABLET, FILM COATED ORAL at 09:23

## 2023-05-01 RX ADMIN — HEPARIN SODIUM 5000 UNITS: 5000 INJECTION INTRAVENOUS; SUBCUTANEOUS at 21:21

## 2023-05-01 RX ADMIN — CEFTRIAXONE SODIUM 1 G: 1 INJECTION, SOLUTION INTRAVENOUS at 01:27

## 2023-05-01 RX ADMIN — FUROSEMIDE 20 MG: 20 TABLET ORAL at 09:23

## 2023-05-01 RX ADMIN — BUDESONIDE 0.5 MG: 0.5 SUSPENSION RESPIRATORY (INHALATION) at 19:16

## 2023-05-01 NOTE — PROGRESS NOTES
[facility]   Hospitalist Progress Note  Date: 2023  Patient Name: Lamar Vee  : 1970  MRN: [id number]  Date of admission: 2023      Subjective   Subjective     Chief Complaint: Follow-up shortness of breath    Summary:Lamar Vee is a 52 y.o. female with past medical history of hypertension, sarcoidosis, asthma, seasonal allergies, and history of substance abuse who presented to the emergency department in [facility].  She had had shortness of breath cough and chest congestion x3 days.  She says that this has only gotten worse each day.  Shortness of breath worse with exertion.    Chest x-ray demonstrated bilateral infiltrates.  BNP within normal limits.  COVID not detected by PCR. She was quite hypoxic as well with an O2 saturation of 86% on room air.  The patient's ABG showed a PaO2 of 51.  Patient transferred to our facility for access to pulmonology.  Patient admitted started on empiric antibiotics systemic steroids inhaled bronchodilators bronchopulmonary hygiene protocol and continued on supplemental oxygen.  Pneumococcal IgM positive.  Respiratory panel positive for parainfluenza virus.  Shortness of breath not improving initially.  Cough remained nonproductive.  Pulmonology consulted.  Planning on bronchoscopy 23.    Interval Followup: Patient seen and evaluated this morning resting comfortably in bed.  Patient still has a nagging nonproductive cough.  Patient still short of breath with activity.  Patient attempted to remove oxygen when she went to the restroom today and desatted to the low 80s.  Afebrile overnight.  Sinus rhythm 60s to 100 on telemetry reviewed.  Blood pressure low normal limits.  Still requiring 3 L nasal cannula keep sats greater than 90%.  Serum potassium within normal limits this morning.  No other issues per nursing.    Review of Systems  Constitutional: Positive for fatigue and negative fever.   HENT: Negative for sore throat and trouble  swallowing.    Eyes: Negative for pain and discharge.   Respiratory: Positive for cough and shortness of breath.    Cardiovascular: Negative for chest pain and palpitations.   Gastrointestinal: Negative for abdominal pain, nausea and vomiting.   Endocrine: Negative for cold intolerance and heat intolerance.   Genitourinary: Negative for difficulty urinating and dysuria.   Musculoskeletal: Negative for back pain and neck stiffness.   Skin: Negative for color change and rash.   Neurological: Negative for syncope and headaches.   Hematological: Negative for adenopathy.   Psychiatric/Behavioral: Negative for confusion and hallucinations.    Objective   Objective     Vitals:   Temp:  [97.2 °F (36.2 °C)-97.9 °F (36.6 °C)] 97.9 °F (36.6 °C)  Heart Rate:  [69-96] 83  Resp:  [18-20] 20  BP: (100-111)/(53-87) 100/53  Flow (L/min):  [3] 3  Physical Exam   Gen. well-developed appearing stated age in no acute distress  HEENT: Normocephalic atraumatic moist membranes pupils equal round reactive light, no scleral icterus no conjunctival injection  Cardiovascular: regular rate and rhythm no murmurs rubs or gallops S1-S2, no lower extremity edema appreciated  Pulmonary: Bilateral expiratory wheezes and coarse rhonchi, no rales appreciated symmetric chest expansion, unlabored, no conversational dyspnea appreciated  Gastrointestinal: Soft nontender nondistended positive bowel sounds all 4 quadrants no rebound or guarding  Musculoskeletal: No clubbing cyanosis, warm and well-perfused, calves soft symmetric nontender bilaterally  Skin: Clean dry without rashs  Neuro: Cranial nerves II through XII intact grossly no sensorimotor deficits appreciated bilateral upper and lower extremities  Psych: Patient is calm cooperative and appropriate with exam not responding to internal stimuli  : No Diamond catheter no bladder distention no suprapubic tenderness    Result Review    Result Review:  I have personally reviewed these results and agree  with these findings:  [x]  Laboratory  LAB RESULTS:      Lab 05/01/23  0540 04/30/23  0129 04/30/23  0128   WBC 7.88  --  6.82   HEMOGLOBIN 11.4*  --  12.6   HEMATOCRIT 34.8  --  37.7   PLATELETS 230  --  225   NEUTROS ABS 7.10*  --  6.40   IMMATURE GRANS (ABS) 0.05  --  0.06*   LYMPHS ABS 0.39*  --  0.28*   MONOS ABS 0.33  --  0.08*   EOS ABS 0.00  --  0.00   MCV 86.1  --  84.5   PROCALCITONIN  --  0.10  --          Lab 05/01/23  0540 04/30/23  0129 04/30/23  0128   SODIUM 140  --  138   POTASSIUM 4.5  --  3.3*   CHLORIDE 104  --  99   CO2 26.3  --  26.5   ANION GAP 9.7  --  12.5   BUN 12  --  9   CREATININE 0.68  --  0.75   EGFR 103.6  --  94.7   GLUCOSE 126*  --  151*   CALCIUM 8.9  --  9.1   MAGNESIUM  --  1.9  --              Lab 04/30/23  0129   PROBNP 414.7                 Brief Urine Lab Results     None        Microbiology Results (last 10 days)     Procedure Component Value - Date/Time    Mycoplasma Pneumoniae Antibody, IgM - Blood, Arm, Left [[id number]]  (Abnormal) Collected: 05/01/23 0916    Lab Status: Final result Specimen: Blood from Arm, Left Updated: 05/01/23 1031     Mycoplasma pneumo IgM Positive    Respiratory Panel PCR w/COVID-19(SARS-CoV-2) KITTY/NOEMI/MEL/PAD/COR/MAD/IVAN In-House, NP Swab in UTM/[facility], [place], 3-4 HR TAT - Swab, Nasopharynx [[id number]]  (Abnormal) Collected: 05/01/23 0906    Lab Status: Final result Specimen: Swab from Nasopharynx Updated: 05/01/23 1037     ADENOVIRUS, PCR Not Detected     Coronavirus 229E Not Detected     Coronavirus HKU1 Not Detected     Coronavirus NL63 Not Detected     Coronavirus OC43 Not Detected     COVID19 Not Detected     Human Metapneumovirus Not Detected     Human Rhinovirus/Enterovirus Not Detected     Influenza B PCR Not Detected     Parainfluenza Virus 1 Detected     Parainfluenza Virus 2 Not Detected     Parainfluenza Virus 3 Not Detected     Parainfluenza Virus 4 Not Detected     RSV, PCR Not Detected     Bordetella pertussis pcr Not Detected     Bordetella  parapertussis PCR Not Detected     Chlamydophila pneumoniae PCR Not Detected     Mycoplasma pneumo by PCR Not Detected    Narrative:      In the setting of a positive respiratory panel with a viral infection PLUS a negative procalcitonin without other underlying concern for bacterial infection, consider observing off antibiotics or discontinuation of antibiotics and continue supportive care. If the respiratory panel is positive for atypical bacterial infection (Bordetella pertussis, Chlamydophila pneumoniae, or Mycoplasma pneumoniae), consider antibiotic de-escalation to target atypical bacterial infection.    Legionella Antigen, Urine - Urine, Urine, Clean Catch [[id number]]  (Normal) Collected: 04/30/23 0611    Lab Status: Final result Specimen: Urine, Clean Catch Updated: 04/30/23 0723     LEGIONELLA ANTIGEN, URINE Negative    S. Pneumo Ag Urine or CSF - Urine, Urine, Clean Catch [[id number]]  (Normal) Collected: 04/30/23 0611    Lab Status: Final result Specimen: Urine, Clean Catch Updated: 04/30/23 0722     Strep Pneumo Ag Negative    Blood Culture - Blood, Arm, Left [[id number]]  (Normal) Collected: 04/30/23 0131    Lab Status: Preliminary result Specimen: Blood from Arm, Left Updated: 05/01/23 0145     Blood Culture No growth at 24 hours    Blood Culture - Blood, Hand, Right [[id number]]  (Normal) Collected: 04/30/23 0130    Lab Status: Preliminary result Specimen: Blood from Hand, Right Updated: 05/01/23 0145     Blood Culture No growth at 24 hours    Mycoplasma Pneumoniae Antibody, IgM - Blood, Arm, Left [[id number]]  (Abnormal) Collected: 04/30/23 0129    Lab Status: Final result Specimen: Blood from Arm, Left Updated: 04/30/23 0212     Mycoplasma pneumo IgM Positive          [x]  Microbiology  []  Radiology  No radiology results for the last 7 days    [x]  EKG/Telemetry   []  Cardiology/Vascular   []  Pathology  []  Old records  [x]  Other:  Scheduled Meds:!Patient Home Medications Stored in Pharmacy, , Does  not apply, BID  amitriptyline, 100 mg, Oral, Nightly  arformoterol, 15 mcg, Nebulization, BID - RT  azithromycin, 500 mg, Oral, Nightly  budesonide, 0.5 mg, Nebulization, BID - RT  buprenorphine, 24 mg, Sublingual, Daily  cefTRIAXone, 1 g, Intravenous, Q24H  cetirizine, 10 mg, Oral, Daily  furosemide, 20 mg, Oral, Daily  heparin (porcine), 5,000 Units, Subcutaneous, Q8H  lamoTRIgine, 200 mg, Oral, Daily  levalbuterol, 1.25 mg, Nebulization, Q6H While Awake - RT  levothyroxine, 100 mcg, Oral, Q AM  methylPREDNISolone sodium succinate, 40 mg, Intravenous, Q8H  pantoprazole, 40 mg, Oral, QAM  revefenacin, 175 mcg, Nebulization, Daily - RT  sodium chloride, 10 mL, Intravenous, Q12H      Continuous Infusions:   PRN Meds:.•  acetaminophen  •  benzonatate  •  clonazePAM  •  guaiFENesin-codeine  •  levalbuterol  •  nitroglycerin  •  ondansetron **OR** ondansetron  •  sodium chloride  •  sodium chloride  •  tiZANidine      Assessment & Plan   Assessment / Plan     Assessment/Plan:  Acute hypoxic respiratory failure secondary to community-acquired pneumonia with acute exacerbation of eosinophilic asthma  Bilateral community-acquired pneumonia due to Mycoplasma pneumonia and parainfluenza  Difficulty with airway clearance  Acute exacerbation of eosinophilic asthma  Sarcoidosis  Hypothyroidism  Anxiety  History of opioid dependence on Subutex      Patient admitted for further evaluation and treatment  Pulmonology critical care consulted thank you for your assistance  Continue supplemental nasal cannula oxygen titrated keep sats greater than 90 percent  Continue Rocephin azithromycin  Continue bronchopulmonary hygiene protocol  Plan for bronchoscopy in a.m.  Continue Solu-Medrol and taper per pulmonology  Continue Brovana, Pulmicort, Yupelri, Xopenex scheduled  Continue as needed Xopenex nebs  Continue as needed antitussives  Continue home levothyroxine  Continue home Lamictal and amitriptyline  Continue home Subutex  Further  inpatient orders recommendations pending clinical course           Discussed plan with bedside RN as well as pulmonology critical care.    Disposition: Home once respiratory function improves    DVT prophylaxis:  Medical DVT prophylaxis orders are present.    CODE STATUS:   Code Status (Patient has no pulse and is not breathing): CPR (Attempt to Resuscitate)  Medical Interventions (Patient has pulse or is breathing): Full Support

## 2023-05-01 NOTE — CONSULTS
Pulmonary / Critical Care Consult Note      Patient Name: Lamar Vee  : 1970  MRN: [id number]  Primary Care Physician:  Everett Tapia MD  Referring Physician: Jae Ken MD  Date of admission: 2023    Subjective   Subjective     Reason for Consult/ Chief Complaint: Recurrent pneumonia, sarcoidosis, eosinophilic asthma.    HPI:  Lamar Vee is a 52 y.o. 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  CT chest there revealed bilateral patchy groundglass opacities consistent with pneumonia.  COVID swab was negative.  Patient is known to our services for chronic pulmonary issues.  Because of the above our services was consulted for further evaluation and treatment.  Upon exam, patient is sitting up in bed on 3 L nasal cannula no apparent distress.  She states she has ongoing recurrent pneumonias.  She has coarse congested cough.  She does continue to take Fasenra injections for eosinophilic asthma.  She is no longer on any treatment for sarcoidosis.  He was treated with prednisone for 6 months followed by methotrexate in the past.  Patient states she was last treated for pneumonia in January where she underwent bronchoscopy by Dr. Field.  She complains of shortness of breath, cough, and wheeze.  She denies any fever, chills, nausea, vomiting, hemoptysis, or chest pain.  She denies being around any sick contacts.  She does not smoke.    Review of Systems  General:  No Fatigue, No Fever.   HEENT: No dysphagia, No Visual Changes, no rhinorrhea  Respiratory:  + Productive cough, + Dyspnea, clear phlegm, No Pleuritic Pain, + wheezing, no hemoptysis  Cardiovascular: Denies chest pain, denies palpitations, + CAN, No Chest Pressure  Gastrointestinal:  No Abdominal Pain,  No Nausea, No Vomiting, No Diarrhea  Genitourinary:  No Dysuria, No Frequency, No Hesitancy  Musculoskeletal: No muscle pain or swelling  Endocrine:  No Heat Intolerance, No Cold Intolerance  Hematologic:  No Bleeding, No Bruising  Psychiatric:  No Anxiety, No Depression  Neurologic:  No Confusion, no Dysarthria, No Headaches  Skin:  No Rash, No Open Wounds    Personal History     Past Medical History:   Diagnosis Date   • Anxiety and depression    • Asthma    • Disease of thyroid gland    • H/O: substance abuse     opiates   • Hypertension    • Melanoma 1992   • Migraine    • Neuropathy    • Sarcoidosis    • Seasonal allergies        Past Surgical History:   Procedure Laterality Date   • APPENDECTOMY     • BACK SURGERY      lumbar   • BRONCHOSCOPY N/A 1/20/2023    Procedure: BRONCHOSCOPY WITH BIOPSIES AND BAL;  Surgeon: Victor M Field DO;  Location: [facility] ENDOSCOPY;  Service: Pulmonary;  Laterality: N/A;  SARCOIDOSIS AND RECURRENT LUNG INFECTIONS   • CHOLECYSTECTOMY     • COLONOSCOPY     • FACIAL COSMETIC SURGERY     • HYSTERECTOMY     • SHOULDER SURGERY      spur removal   • TONSILLECTOMY AND ADENOIDECTOMY     • WISDOM TOOTH EXTRACTION         Family History: family history includes Cancer in her maternal aunt and mother; Heart disease in her maternal aunt.  Denies any family history of lung disease or cancer.    Social History:  reports that she has never smoked. She has never used smokeless tobacco. She reports that she does not currently use alcohol. She reports current drug use.    Home Medications:  Benralizumab, Rimegepant Sulfate, albuterol, albuterol sulfate HFA, amitriptyline, benzonatate, buPROPion XL, budesonide-formoterol, buprenorphine, cetirizine, clonazePAM, fluticasone, furosemide, ibuprofen, ipratropium-albuterol, lamoTRIgine, levothyroxine, linaclotide, montelukast, ondansetron ODT, pantoprazole, predniSONE, promethazine-dextromethorphan, rOPINIRole, and  tiZANidine    Allergies:  Allergies   Allergen Reactions   • Morphine Headache   • Naloxone    • Pentazocine Unknown - Low Severity   • Tetanus Toxoids    • Toradol [Ketorolac Tromethamine]    • Tyloxapol      Objective    Objective     Vitals:   Temp:  [97.2 °F (36.2 °C)-97.9 °F (36.6 °C)] 97.2 °F (36.2 °C)  Heart Rate:  [69-97] 90  Resp:  [18-20] 20  BP: (103-122)/(53-87) 105/87  Flow (L/min):  [3] 3    Physical Exam:  Vital Signs Reviewed   General:  WDWN female, Alert, NAD on 3 L NC  HEENT:  PERRL, EOMI.  OP, nares clear, no sinus tenderness  Neck:  Supple, no JVD, no thyromegaly  Lymph: no axillary, cervical, supraclavicular lymphadenopathy noted bilaterally  Chest:  good aeration, coarse rhonchi bilaterally, tympanic to percussion bilaterally, no work of breathing noted  CV: RRR, no MGR, pulses 2+, equal  Abd:  Soft, NT, ND, + BS, no HSM  EXT:  no clubbing, no cyanosis, no edema, no joint tenderness  Neuro:  A&Ox3, CN grossly intact, no focal deficits  Skin: No rashes or lesions noted      Result Review    Result Review:  I have personally reviewed the results from the time of this admission to 5/1/2023 08:53 EDT and agree with these findings:  [x]  Laboratory  [x]  Microbiology  [x]  Radiology  [x]  EKG/Telemetry   []  Cardiology/Vascular   []  Pathology  [x]  Old records  []  Other:  Most notable findings include:     5/1 parainfluenza on respiratory viral panel  Mycoplasma IgM positive    CT chest from [facility] revealed patchy multifocal groundglass opacities  COVId there was negative        Lab 05/01/23  0540 04/30/23  0128   WBC 7.88 6.82   HEMOGLOBIN 11.4* 12.6   HEMATOCRIT 34.8 37.7   PLATELETS 230 225   SODIUM 140 138   POTASSIUM 4.5 3.3*   CHLORIDE 104 99   CO2 26.3 26.5   BUN 12 9   CREATININE 0.68 0.75   GLUCOSE 126* 151*   CALCIUM 8.9 9.1     Assessment & Plan   Assessment / Plan     Active Hospital Problems:  Active Hospital Problems    Diagnosis    • **Pneumonia       Impression:  Mycoplasma pneumonia  Parainfluenza  Acute hypoxic respiratory failure  Acute exacerbation of eosinophilic asthma: On Fasenra  Issues with airway clearance  Recent COVID: February 2023  Hx of sarcoidosis: 2013, previously on methotrexate  Hx of opioid abuse: On Subutex    Plan:  -CT chest report reviewed from [facility] revealing focal patchy groundglass opacities.  Patient has coarse congested cough and issues with airway clearance.  -We will start on MetaNeb for airway clearance.  -Discussed proceeding forward with bronchoscopy and patient is agreeable.  Will take for bronchoscopy with airway inspection, brushings, bronchoalveolar lavage tomorrow.  I have discussed the risks of the procedure with the patient including pneumothorax, hemothorax, bleeding, hypoxia, required mechanical ventilation and death. The patient recognizes these findings, acknowledges these findings and is agreeable to the procedure.  -N.p.o. after midnight.  -Pro-Kd 0.11. We will check respiratory viral panel--> Parainfluenza.  We will check mycoplasma IgM--> positive.  Strep and Legionella negative.  Pending sputum culture.  Continue azithromycin and ceftriaxone.  Can de-escalate based on cultures.  -Continue Tussionex and Tessalon Perles for cough.  -Continue Solu-Medrol 40 mg IV every 8 hours.  -Continue Brovana, Pulmicort, Yupelri nebulizers.  -Continue bronchopulmonary hygiene.  Encourage I-S and flutter valve.  -Continue Lasix 20 mg p.o. daily.  Trend renal panel and electrolytes.  -Encourage mobilization.  Out of bed to chair.    Labs, microbiology, radiology, medications, and provider notes personally reviewed.  Discussed with primary services and bedside RN.    Thank you for this consult and allowing me to participate in the care of Ms. Vee.    Electronically signed by OSMAN Peterson, 05/01/23, 12:32 PM EDT.    This visit was performed by BOTH a physician and an APC. I personally evaluated and examined the  patient. I performed all aspects of MDM as documented. , I have reviewed and confirmed the accuracy of the patient's history as documented in this note. and I have reexamined the patient and the results are consistent with the previously documented exam. I have updated the documentation as necessary.     Electronically signed by Fawad Christian MD, 05/01/23, 2:25 PM EDT.   Electronically signed by Fawad Christian MD, 5/1/2023, 08:53 EDT.

## 2023-05-01 NOTE — PLAN OF CARE
Goal Outcome Evaluation:  Plan of Care Reviewed With: patient        Progress: no change  Outcome Evaluation: Patient rested well majority of shift. Cough treated per MAR. VSS.

## 2023-05-01 NOTE — PROGRESS NOTES
Respiratory Therapist Broncho-Pulmonary Hygiene Progress Note      Patient Name:  Lamar Vee  YOB: 1970    Lamar Vee meets the qualification for Level 3 of the Bronco-Pulmonary Hygiene Protocol. This was based on my daily patient assessment and includes review of chest x-ray results, cough ability and quality, oxygenation, secretions or risk for secretion development and patient mobility.     Broncho-Pulmonary Hygiene Assessment:    Level of Movement: Actively changing positions without assistance  Alert/ oriented/ cooperative    Breath Sounds: Bilateral localized decreased air exchange and/or coarse rhonchi    Cough: Strong, effective and/or frequent    Chest X-Ray: Presence of atelectasis and/or consolidation    Sputum Productions: None or small amount of thin or watery secretions with effective cough    History and Physical: New onset of bronchitis or mucus plugging    SpO2 to Oxygen Need: greater than 92% on room air or  less than 3L nasal canula    Current SpO2 is:94% on 3LNC    Based on this information, I have completed the following interventions: MetaNeb       Electronically signed by Estefani Murillo RRT, 05/01/23, 9:52 AM EDT.

## 2023-05-01 NOTE — PLAN OF CARE
Goal Outcome Evaluation:   VSS. SR.  Lungs with wheezes throughout, non-productive cough.  Plan for Bronchoscopy tomorrow.

## 2023-05-02 ENCOUNTER — ANESTHESIA (OUTPATIENT)
Dept: GASTROENTEROLOGY | Facility: HOSPITAL | Age: 53
End: 2023-05-02
Payer: COMMERCIAL

## 2023-05-02 ENCOUNTER — ANESTHESIA EVENT (OUTPATIENT)
Dept: GASTROENTEROLOGY | Facility: HOSPITAL | Age: 53
End: 2023-05-02
Payer: COMMERCIAL

## 2023-05-02 LAB
ACB CMPLX DNA BAL NAA+NON-PRB-NCNCRNG: NOT DETECTED
BLACTX-M ISLT/SPM QL: ABNORMAL
BLAIMP ISLT/SPM QL: ABNORMAL
BLAKPC ISLT/SPM QL: ABNORMAL
BLAOXA-48-LIKE ISLT/SPM QL: ABNORMAL
BLAVIM ISLT/SPM QL: ABNORMAL
C PNEUM DNA NPH QL NAA+NON-PROBE: NOT DETECTED
E CLOAC COMP DNA BAL NAA+NON-PRB-NCNCRNG: NOT DETECTED
E COLI DNA BAL NAA+NON-PRB-NCNCRNG: NOT DETECTED
FLUAV SUBTYP SPEC NAA+PROBE: NOT DETECTED
FLUBV RNA ISLT QL NAA+PROBE: NOT DETECTED
GP B STREP DNA BAL NAA+NON-PRB-NCNCRNG: NOT DETECTED
GRAM STN SPEC: NORMAL
HADV DNA SPEC NAA+PROBE: NOT DETECTED
HAEM INFLU DNA BAL NAA+NON-PRB-NCNCRNG: NOT DETECTED
HCOV RNA LOWER RESP QL NAA+NON-PROBE: NOT DETECTED
HMPV RNA NPH QL NAA+NON-PROBE: NOT DETECTED
HPIV RNA LOWER RESP QL NAA+NON-PROBE: DETECTED
K AEROGENES DNA BAL NAA+NON-PRB-NCNCRNG: NOT DETECTED
K OXYTOCA DNA BAL NAA+NON-PRB-NCNCRNG: NOT DETECTED
K PNEU GRP DNA BAL NAA+NON-PRB-NCNCRNG: NOT DETECTED
L PNEUMO DNA LOWER RESP QL NAA+NON-PROBE: NOT DETECTED
LYMPHOCYTES NFR FLD MANUAL: 15 %
M CATARRHALIS DNA BAL NAA+NON-PRB-NCNCRNG: NOT DETECTED
M PNEUMO IGG SER IA-ACNC: NOT DETECTED
MACROPHAGE FLUID: 5 %
MECA+MECC ISLT/SPM QL: DETECTED
NDM GENE: ABNORMAL
NEUTROPHILS NFR FLD MANUAL: 80 %
NIGHT BLUE STAIN TISS: NORMAL
P AERUGINOSA DNA BAL NAA+NON-PRB-NCNCRNG: NOT DETECTED
PROTEUS SP DNA BAL NAA+NON-PRB-NCNCRNG: NOT DETECTED
RHINOVIRUS RNA SPEC NAA+PROBE: NOT DETECTED
RSV RNA NPH QL NAA+NON-PROBE: NOT DETECTED
S AUREUS DNA BAL NAA+NON-PRB-NCNCRNG: DETECTED
S MARCESCENS DNA BAL NAA+NON-PRB-NCNCRNG: NOT DETECTED
S PNEUM DNA BAL NAA+NON-PRB-NCNCRNG: NOT DETECTED
S PYO DNA BAL NAA+NON-PRB-NCNCRNG: NOT DETECTED
VISUAL PRESENCE OF BLOOD: NORMAL

## 2023-05-02 PROCEDURE — 0BD48ZX EXTRACTION OF RIGHT UPPER LOBE BRONCHUS, VIA NATURAL OR ARTIFICIAL OPENING ENDOSCOPIC, DIAGNOSTIC: ICD-10-PCS | Performed by: INTERNAL MEDICINE

## 2023-05-02 PROCEDURE — 31628 BRONCHOSCOPY/LUNG BX EACH: CPT | Performed by: INTERNAL MEDICINE

## 2023-05-02 PROCEDURE — 0B9C8ZX DRAINAGE OF RIGHT UPPER LUNG LOBE, VIA NATURAL OR ARTIFICIAL OPENING ENDOSCOPIC, DIAGNOSTIC: ICD-10-PCS | Performed by: INTERNAL MEDICINE

## 2023-05-02 PROCEDURE — 94664 DEMO&/EVAL PT USE INHALER: CPT

## 2023-05-02 PROCEDURE — 25010000002 PROPOFOL 10 MG/ML EMULSION: Performed by: NURSE ANESTHETIST, CERTIFIED REGISTERED

## 2023-05-02 PROCEDURE — 88108 CYTOPATH CONCENTRATE TECH: CPT | Performed by: INTERNAL MEDICINE

## 2023-05-02 PROCEDURE — 87205 SMEAR GRAM STAIN: CPT | Performed by: INTERNAL MEDICINE

## 2023-05-02 PROCEDURE — 94799 UNLISTED PULMONARY SVC/PX: CPT

## 2023-05-02 PROCEDURE — 89051 BODY FLUID CELL COUNT: CPT | Performed by: INTERNAL MEDICINE

## 2023-05-02 PROCEDURE — 87206 SMEAR FLUORESCENT/ACID STAI: CPT | Performed by: INTERNAL MEDICINE

## 2023-05-02 PROCEDURE — 88305 TISSUE EXAM BY PATHOLOGIST: CPT | Performed by: INTERNAL MEDICINE

## 2023-05-02 PROCEDURE — 87070 CULTURE OTHR SPECIMN AEROBIC: CPT | Performed by: INTERNAL MEDICINE

## 2023-05-02 PROCEDURE — 88312 SPECIAL STAINS GROUP 1: CPT | Performed by: INTERNAL MEDICINE

## 2023-05-02 PROCEDURE — 25010000002 HEPARIN (PORCINE) PER 1000 UNITS: Performed by: FAMILY MEDICINE

## 2023-05-02 PROCEDURE — 25010000002 METHYLPREDNISOLONE PER 40 MG: Performed by: INTERNAL MEDICINE

## 2023-05-02 PROCEDURE — 63710000001 REVEFENACIN 175 MCG/3ML SOLUTION: Performed by: FAMILY MEDICINE

## 2023-05-02 PROCEDURE — 25010000002 HEPARIN (PORCINE) PER 1000 UNITS: Performed by: INTERNAL MEDICINE

## 2023-05-02 PROCEDURE — 87116 MYCOBACTERIA CULTURE: CPT | Performed by: INTERNAL MEDICINE

## 2023-05-02 PROCEDURE — 87633 RESP VIRUS 12-25 TARGETS: CPT | Performed by: INTERNAL MEDICINE

## 2023-05-02 PROCEDURE — 99233 SBSQ HOSP IP/OBS HIGH 50: CPT | Performed by: INTERNAL MEDICINE

## 2023-05-02 PROCEDURE — 87102 FUNGUS ISOLATION CULTURE: CPT | Performed by: INTERNAL MEDICINE

## 2023-05-02 PROCEDURE — 31624 DX BRONCHOSCOPE/LAVAGE: CPT | Performed by: INTERNAL MEDICINE

## 2023-05-02 PROCEDURE — 88104 CYTOPATH FL NONGYN SMEARS: CPT | Performed by: INTERNAL MEDICINE

## 2023-05-02 PROCEDURE — 87147 CULTURE TYPE IMMUNOLOGIC: CPT | Performed by: INTERNAL MEDICINE

## 2023-05-02 PROCEDURE — 25010000002 CEFTRIAXONE PER 250 MG: Performed by: FAMILY MEDICINE

## 2023-05-02 PROCEDURE — 87071 CULTURE AEROBIC QUANT OTHER: CPT | Performed by: INTERNAL MEDICINE

## 2023-05-02 PROCEDURE — 87186 SC STD MICRODIL/AGAR DIL: CPT | Performed by: INTERNAL MEDICINE

## 2023-05-02 PROCEDURE — 31623 DX BRONCHOSCOPE/BRUSH: CPT | Performed by: INTERNAL MEDICINE

## 2023-05-02 PROCEDURE — 25010000002 METHYLPREDNISOLONE PER 40 MG: Performed by: FAMILY MEDICINE

## 2023-05-02 RX ORDER — IPRATROPIUM BROMIDE AND ALBUTEROL SULFATE 2.5; .5 MG/3ML; MG/3ML
3 SOLUTION RESPIRATORY (INHALATION) ONCE
Status: COMPLETED | OUTPATIENT
Start: 2023-05-02 | End: 2023-05-02

## 2023-05-02 RX ORDER — LIDOCAINE HYDROCHLORIDE 40 MG/ML
INJECTION, SOLUTION RETROBULBAR; TOPICAL AS NEEDED
Status: DISCONTINUED | OUTPATIENT
Start: 2023-05-02 | End: 2023-05-02 | Stop reason: HOSPADM

## 2023-05-02 RX ORDER — LIDOCAINE HYDROCHLORIDE 20 MG/ML
INJECTION, SOLUTION EPIDURAL; INFILTRATION; INTRACAUDAL; PERINEURAL AS NEEDED
Status: DISCONTINUED | OUTPATIENT
Start: 2023-05-02 | End: 2023-05-02 | Stop reason: SURG

## 2023-05-02 RX ORDER — MAGNESIUM HYDROXIDE 1200 MG/15ML
LIQUID ORAL AS NEEDED
Status: DISCONTINUED | OUTPATIENT
Start: 2023-05-02 | End: 2023-05-02 | Stop reason: HOSPADM

## 2023-05-02 RX ORDER — SODIUM CHLORIDE, SODIUM LACTATE, POTASSIUM CHLORIDE, CALCIUM CHLORIDE 600; 310; 30; 20 MG/100ML; MG/100ML; MG/100ML; MG/100ML
30 INJECTION, SOLUTION INTRAVENOUS CONTINUOUS
Status: DISCONTINUED | OUTPATIENT
Start: 2023-05-02 | End: 2023-05-02

## 2023-05-02 RX ORDER — PROPOFOL 10 MG/ML
VIAL (ML) INTRAVENOUS AS NEEDED
Status: DISCONTINUED | OUTPATIENT
Start: 2023-05-02 | End: 2023-05-02 | Stop reason: SURG

## 2023-05-02 RX ORDER — KETAMINE HCL IN NACL, ISO-OSM 100MG/10ML
SYRINGE (ML) INJECTION AS NEEDED
Status: DISCONTINUED | OUTPATIENT
Start: 2023-05-02 | End: 2023-05-02 | Stop reason: SURG

## 2023-05-02 RX ADMIN — HEPARIN SODIUM 5000 UNITS: 5000 INJECTION INTRAVENOUS; SUBCUTANEOUS at 21:02

## 2023-05-02 RX ADMIN — LIDOCAINE HYDROCHLORIDE 50 MG: 20 INJECTION, SOLUTION EPIDURAL; INFILTRATION; INTRACAUDAL; PERINEURAL at 12:37

## 2023-05-02 RX ADMIN — FUROSEMIDE 20 MG: 20 TABLET ORAL at 15:29

## 2023-05-02 RX ADMIN — METHYLPREDNISOLONE SODIUM SUCCINATE 40 MG: 40 INJECTION INTRAMUSCULAR; INTRAVENOUS at 15:28

## 2023-05-02 RX ADMIN — GUAIFENESIN AND CODEINE PHOSPHATE 5 ML: 10; 100 LIQUID ORAL at 15:36

## 2023-05-02 RX ADMIN — ARFORMOTEROL TARTRATE 15 MCG: 15 SOLUTION RESPIRATORY (INHALATION) at 20:02

## 2023-05-02 RX ADMIN — Medication 20 MG: at 12:38

## 2023-05-02 RX ADMIN — SODIUM CHLORIDE, POTASSIUM CHLORIDE, SODIUM LACTATE AND CALCIUM CHLORIDE 30 ML/HR: 600; 310; 30; 20 INJECTION, SOLUTION INTRAVENOUS at 11:51

## 2023-05-02 RX ADMIN — CLONAZEPAM 1 MG: 0.5 TABLET ORAL at 16:10

## 2023-05-02 RX ADMIN — IPRATROPIUM BROMIDE AND ALBUTEROL SULFATE 3 ML: 2.5; .5 SOLUTION RESPIRATORY (INHALATION) at 11:51

## 2023-05-02 RX ADMIN — AMITRIPTYLINE HYDROCHLORIDE 100 MG: 100 TABLET, FILM COATED ORAL at 21:03

## 2023-05-02 RX ADMIN — GUAIFENESIN AND CODEINE PHOSPHATE 5 ML: 10; 100 LIQUID ORAL at 07:44

## 2023-05-02 RX ADMIN — Medication 10 ML: at 21:03

## 2023-05-02 RX ADMIN — GUAIFENESIN AND CODEINE PHOSPHATE 5 ML: 10; 100 LIQUID ORAL at 21:02

## 2023-05-02 RX ADMIN — BUDESONIDE 0.5 MG: 0.5 SUSPENSION RESPIRATORY (INHALATION) at 08:50

## 2023-05-02 RX ADMIN — TIZANIDINE 8 MG: 4 TABLET ORAL at 21:09

## 2023-05-02 RX ADMIN — METHYLPREDNISOLONE SODIUM SUCCINATE 40 MG: 40 INJECTION INTRAMUSCULAR; INTRAVENOUS at 22:06

## 2023-05-02 RX ADMIN — REVEFENACIN 175 MCG: 175 SOLUTION RESPIRATORY (INHALATION) at 08:54

## 2023-05-02 RX ADMIN — CETIRIZINE HYDROCHLORIDE 10 MG: 10 TABLET, FILM COATED ORAL at 15:29

## 2023-05-02 RX ADMIN — Medication 10 MG: at 12:45

## 2023-05-02 RX ADMIN — PROPOFOL 125 MCG/KG/MIN: 10 INJECTION, EMULSION INTRAVENOUS at 12:37

## 2023-05-02 RX ADMIN — LAMOTRIGINE 200 MG: 100 TABLET ORAL at 15:45

## 2023-05-02 RX ADMIN — BUDESONIDE 0.5 MG: 0.5 SUSPENSION RESPIRATORY (INHALATION) at 20:02

## 2023-05-02 RX ADMIN — LEVALBUTEROL HYDROCHLORIDE 1.25 MG: 1.25 SOLUTION RESPIRATORY (INHALATION) at 20:02

## 2023-05-02 RX ADMIN — PROPOFOL 10 MG: 10 INJECTION, EMULSION INTRAVENOUS at 12:37

## 2023-05-02 RX ADMIN — PANTOPRAZOLE SODIUM 40 MG: 40 TABLET, DELAYED RELEASE ORAL at 15:28

## 2023-05-02 RX ADMIN — BUPRENORPHINE 24 MG: 8 TABLET SUBLINGUAL at 15:28

## 2023-05-02 RX ADMIN — Medication: at 08:00

## 2023-05-02 RX ADMIN — CEFTRIAXONE SODIUM 1 G: 1 INJECTION, SOLUTION INTRAVENOUS at 02:12

## 2023-05-02 RX ADMIN — LEVALBUTEROL HYDROCHLORIDE 1.25 MG: 1.25 SOLUTION RESPIRATORY (INHALATION) at 08:50

## 2023-05-02 RX ADMIN — HEPARIN SODIUM 5000 UNITS: 5000 INJECTION INTRAVENOUS; SUBCUTANEOUS at 15:28

## 2023-05-02 RX ADMIN — ARFORMOTEROL TARTRATE 15 MCG: 15 SOLUTION RESPIRATORY (INHALATION) at 08:50

## 2023-05-02 NOTE — ANESTHESIA PREPROCEDURE EVALUATION
Anesthesia Evaluation     Patient summary reviewed and Nursing notes reviewed   no history of anesthetic complications:  NPO Solid Status: > 8 hours  NPO Liquid Status: > 2 hours           Airway   Mallampati: II  TM distance: >3 FB  Neck ROM: full  No difficulty expected  Dental      Pulmonary    (+) pneumonia stable , asthma,decreased breath sounds, wheezes,   Cardiovascular - normal exam  Exercise tolerance: good (4-7 METS)    Rhythm: regular  Rate: normal    (+) hypertension,       Neuro/Psych  (+) headaches,    GI/Hepatic/Renal/Endo    (+)   thyroid problem hypothyroidism    Musculoskeletal (-) negative ROS    Abdominal    Substance History - negative use     OB/GYN negative ob/gyn ROS         Other - negative ROS       ROS/Med Hx Other: PAT Nursing Notes unavailable.                   Anesthesia Plan    ASA 4     general     (Total IV Anesthesia    Patient understands anesthesia not responsible for dental damage.  possible mask ventilation or intubation discussed.  Neb preop  )  intravenous induction     Anesthetic plan, risks, benefits, and alternatives have been provided, discussed and informed consent has been obtained with: patient.  Pre-procedure education provided  Plan discussed with CRNA.        CODE STATUS:    Code Status (Patient has no pulse and is not breathing): CPR (Attempt to Resuscitate)  Medical Interventions (Patient has pulse or is breathing): Full Support

## 2023-05-02 NOTE — OP NOTE
Bronchoscopy Procedure Note    Procedure:  Bronchoscopy with bronchoalveolar lavage right upper lobe  Bronchoscopy with bronchial brushing right upper lobe  Bronchoscopy with transbronchial biopsies right upper lobe  Bronchoscopy with bronchial washings tracheobronchial tree  Airway inspection    Pre-Operative Diagnosis: Persistent pneumonia, chronic cough, severe persistent asthma  Post-Operative Diagnosis: Same, slimy secretions and mucous plugging    Indication:  Persistent pneumonia, chronic    Anesthesia: MAC anesthesia    Procedure Details: Patient was consented for the procedure with all risks and benefits of the procedure explained in detail. Patient was given the opportunity to ask questions and all concerns were answered.    The bronchoscope was inserted into the main airway via the mouth. An anatomical survey was done of the main airways and the subsegmental bronchus. The findings are reported below. A bronchoalveolar lavage was performed using 60 mL aliquots of normal saline x2 instilled into the airways then aspirated back from right upper lobe of lung with 50 mL serosanguineous fluid in return. Then bronchial brushing was obtained from right upper lobe, anterior segment with 2 passes. Blind transbronchial biopsies were done from right upper lobe anterior and posterior segments with several passes blindly. Patient had minimal oozing after the biopsy.  Bronchial washing was obtained from entire tracheobronchial tree.    Findings:  Friable mucosa, easy bleeding with suction  Scattered purulent secretions and slimy secretions, suctioned out    Estimated Blood Loss: Insignificant    Specimens:  BAL right upper lobe  Bronchial brushing right upper lobe  Transbronchial biopsies right upper lobe  Bronchial washings tracheobronchial tree    Complications: None; patient tolerated the procedure well.    Disposition: To [facility], once stable in recovery.    Patient tolerated the procedure  well.      Electronically signed by Fawad Christian MD, 5/2/2023, 12:49 EDT.

## 2023-05-02 NOTE — PLAN OF CARE
Goal Outcome Evaluation: VSS. NSR. Patient down for Bronchoscopy today, tolerated well.  Patient was MRSA positive from bronch washings. Patient upset over dx. Emotional support given and patient medicated for anxiety. Resting quietly now with family at bedside.

## 2023-05-02 NOTE — PROGRESS NOTES
[facility]   Hospitalist Progress Note  Date: 2023  Patient Name: Lamar Vee  : 1970  MRN: [id number]  Date of admission: 2023  Consultants:   -Pulmonology/Critical Care: Dr. Fawad Christian    Subjective   Subjective     Chief Complaint: Shortness of breath    Summary:   Lamar Vee is a 52 y.o. female with hypertension, sarcoidosis, asthma, seasonal allergies and history of substance abuse who presented to the ED in [facility] with complaints of shortness of breath, cough and chest congestion.  CXR showed bilateral infiltrates.  Patient found to be hypoxic requiring supplemental O2.  Transferred to [facility] for pulmonology assessment requested patient started on empiric antibiotics, systemic steroids, inhaled bronchodilators.  Pneumococcal IgM positive.  Respiratory panel positive for parainfluenza virus.    Interval Followup:   Patient n.p.o. since midnight.  Bronchoscopy planned for today (2023) patient still short of breath and having cough but says it is improved overall.  Nursing with no additional acute issues to report.    Procedures:   -Bronchoscopy (2023)    Antibiotics:   -Ceftriaxone    Review of Systems   All systems reviewed and negative unless stated otherwise under subjective.    Objective   Objective     Vitals:   Temp:  [97.3 °F (36.3 °C)-98 °F (36.7 °C)] 98 °F (36.7 °C)  Heart Rate:  [] 106  Resp:  [16-24] 18  BP: ()/(50-84) 142/68  Flow (L/min):  [2-3] 2  Physical Exam   Gen: No acute distress, Conversant, Pleasant, sitting up in bed  Resp: Coarse bilateral rhonchi noted, normal respiratory effort, equal chest rise bilaterally  Card: Regular rhythm, tachycardic, No m/r/g  Abd: Soft, Nontender, Nondistended, + bowel sounds    Result Review    Result Review:  I have personally reviewed the results as below and agree with these findings:  []  Laboratory:   CMP        2023    01:28 2023    05:40   CMP   Glucose 151    126     BUN 9   12     Creatinine 0.75   0.68     EGFR 94.7   103.6     Sodium 138   140     Potassium 3.3   4.5     Chloride 99   104     Calcium 9.1   8.9     BUN/Creatinine Ratio 12.0   17.6     Anion Gap 12.5   9.7       CBC        4/30/2023    01:28 5/1/2023    05:40   CBC   WBC 6.82   7.88     RBC 4.46   4.04     Hemoglobin 12.6   11.4     Hematocrit 37.7   34.8     MCV 84.5   86.1     MCH 28.3   28.2     MCHC 33.4   32.8     RDW 12.4   12.7     Platelets 225   230        Hyperglycemia noted    [x]  Microbiology: Blood culture (04/30/2023): No growth to date  []  Radiology:   []  EKG/Telemetry:    []  Cardiology/Vascular:    []  Pathology:  []  Old records:  []  Other:    Assessment & Plan   Assessment / Plan     Assessment:  Mycoplasma pneumonia  Parainfluenza  Acute hypoxic respiratory failure  Acute exacerbation of eosinophilic asthma  Issues with airway clearance  Recent COVID-19 infection (February 2023)  History of sarcoidosis  History of opioid abuse  Hypothyroidism  Anxiety    Plan:  -Pulmonology/Critical Care consulted and following, appreciate assistance and recommendations in the care of this patient.  -Continue supplemental O2 to maintain sats greater than 90%, wean as tolerated  -Bronchoscopy to be done today (05/02/2023) per pulmonology service.  Follow-up results and subsequent recommendations  -Management discussed with pulmonology service.  Continue Solu-Medrol and will plan for taper.  Bronchoscopy to be done today.  -Continue ceftriaxone  -Continue Lasix.  Monitor electrolytes and renal function  -Continue Brovana, Pulmicort, Xopenex and Yupelri nebulizers  -Continue bronchopulmonary hygiene protocol  -Encouraged mobilization, patient get out of bed to chair throughout the day and encouraged use of incentive spirometer and flutter valve  -Continue Subutex  -Continue Lamictal and amitriptyline  -Continue levothyroxine  -Will monitor electrolytes and renal function with BMP and magnesium level  in the AM  -Will monitor WBC and Hgb with CBC in the AM  -Clinical course will dictate further management     DVT Prophylaxis: Heparin  GI Prophylaxis: Pantoprazole  Diet: Regular  Dispo: Home when medically appropriate for discharge  Code Status: Full code     Personally reviewed patients labs and imaging, discussed with patient and nurse at bedside. Discussed management with the following consultants: Pulmonology.     Part of this note may be an electronic transcription/translation of spoken language to printed text using the Dragon dictation system.    DVT prophylaxis:  Medical DVT prophylaxis orders are present.    CODE STATUS:   Code Status (Patient has no pulse and is not breathing): CPR (Attempt to Resuscitate)  Medical Interventions (Patient has pulse or is breathing): Full Support        Electronically signed by Taz Jennings MD, 05/02/23, 5:31 PM EDT.

## 2023-05-02 NOTE — PROGRESS NOTES
Pulmonary / Critical Care Progress Note      Patient Name: Lamar Vee  : 1970  MRN: [id number]  Primary Care Physician:  Everett Tapia MD  Date of admission: 2023    Subjective   Subjective   Follow-up for recurrent pneumonia, sarcoidosis, eosinophilic asthma    Over past 24 hours, continue ceftriaxone, completed azithromycin, planned for bronchoscopy today.    No acute events overnight.     This morning,   Up at bedside on 2 L nasal cannula  Ambulating in room  Family present  Remains dyspneic, worsens with activity  Wheezing improved  Reports congested cough  Difficulty clearing secretions  No fever/chills  No chest pain      Review of Systems  General:  No Fatigue, No Fever.   HEENT: No dysphagia, No Visual Changes, no rhinorrhea  Respiratory:  + Productive cough, + Dyspnea, clear phlegm, No Pleuritic Pain, + wheezing, no hemoptysis  Cardiovascular: Denies chest pain, denies palpitations, + CAN, No Chest Pressure  Gastrointestinal:  No Abdominal Pain, No Nausea, No Vomiting, No Diarrhea  Genitourinary:  No Dysuria, No Frequency, No Hesitancy  Musculoskeletal: No muscle pain or swelling  Endocrine:  No Heat Intolerance, No Cold Intolerance  Hematologic:  No Bleeding, No Bruising  Psychiatric:  No Anxiety, No Depression  Neurologic:  No Confusion, no Dysarthria, No Headaches  Skin:  No Rash, No Open Wounds      Objective   Objective     Vitals:   Temp:  [97.3 °F (36.3 °C)-98 °F (36.7 °C)] 98 °F (36.7 °C)  Heart Rate:  [67-88] 72  Resp:  [16-24] 24  BP: ()/(51-84) 114/72  Flow (L/min):  [2-3] 2     Physical Exam   Vital Signs Reviewed   General:  WDWN female, Alert, NAD on 2 L NC  HEENT:  PERRL, EOMI.  OP, nares clear, no sinus tenderness  Neck:  Supple, no JVD, no thyromegaly  Lymph: no axillary, cervical, supraclavicular lymphadenopathy noted bilaterally  Chest:  good aeration, coarse rhonchi bilaterally, tympanic to percussion bilaterally, no work of breathing noted  CV: RRR, no  MGR, pulses 2+, equal  Abd:  Soft, NT, ND, + BS, no HSM  EXT:  no clubbing, no cyanosis, no edema, no joint tenderness  Neuro:  A&Ox3, CN grossly intact, no focal deficits  Skin: No rashes or lesions noted      Result Review    Result Review:  I have personally reviewed the results from the time of this admission to 5/2/2023 12:23 EDT and agree with these findings:  [x]  Laboratory  [x]  Microbiology  [x]  Radiology  []  EKG/Telemetry   []  Cardiology/Vascular   []  Pathology  []  Old records  []  Other:  Most notable findings include:     Mycoplasma pneumonia - positive  Respiratory panel - parainfluenza        Lab 05/01/23  0540 04/30/23  0128   WBC 7.88 6.82   HEMOGLOBIN 11.4* 12.6   HEMATOCRIT 34.8 37.7   PLATELETS 230 225   SODIUM 140 138   POTASSIUM 4.5 3.3*   CHLORIDE 104 99   CO2 26.3 26.5   BUN 12 9   CREATININE 0.68 0.75   GLUCOSE 126* 151*   CALCIUM 8.9 9.1         Assessment & Plan   Assessment / Plan     Active Hospital Problems:  Active Hospital Problems    Diagnosis    • **Pneumonia    • Pulmonary sarcoidosis    • Eosinophilic asthma    • Pulmonary infiltrate    • Patellar instability of right knee    • Right knee pain      Impression:   Mycoplasma pneumonia  Parainfluenza  Acute hypoxic respiratory failure  Acute exacerbation of eosinophilic asthma: On Fasenra  Issues with airway clearance  Recent COVID: February 2023  Hx of sarcoidosis: 2013, previously on methotrexate  Hx of opioid abuse: On Subutex    Plan:   -Currently on 2 L nasal cannula, continue to wean to maintain SPO2 greater than 90%  -CT chest report reviewed from [facility] revealing focal patchy groundglass opacities.  Patient has coarse congested cough and issues with airway clearance.  -Will take for bronchoscopy today with airway inspection, brushings, bronchoalveolar lavage tomorrow.  I have discussed the risks of the procedure with the patient including pneumothorax, hemothorax, bleeding, hypoxia, required mechanical ventilation and  death. The patient recognizes these findings, acknowledges these findings and is agreeable to the procedure.  -Remains n.p.o. for bronchoscopy today  -Completed azithromycin, continue ceftriaxone, may de-escalate pending cultures  -Mycoplasma pneumonia positive, sputum culture pending  -Continue guaifenesin AC and Tessalon Perles for cough  -Continue Solu-Medrol 40 mg every 8 hours  -Continue Lasix 20 mg p.o. daily, trend electrolytes and renal function  -Continue Brovana, Pulmicort, Xopenex and Yupelri nebs  -Continue bronchopulmonary hygiene  -Continue MetaNeb  -Encourage use of I-S/flutter valve  -Encourage mobilization, out of bed to chair      DVT prophylaxis:  Medical DVT prophylaxis orders are present.    CODE STATUS:   Code Status (Patient has no pulse and is not breathing): CPR (Attempt to Resuscitate)  Medical Interventions (Patient has pulse or is breathing): Full Support      I personally reviewed pertinent labs, imaging and provider notes. Discussed with bedside nurse and will discuss with primary service.     Electronically signed by OSMAN Luis, 5/2/2023, 12:23 EDT.    This visit was performed by BOTH a physician and an APC. I personally evaluated and examined the patient. I performed all aspects of MDM as documented. , I have reviewed and confirmed the accuracy of the patient's history as documented in this note. and I have reexamined the patient and the results are consistent with the previously documented exam. I have updated the documentation as necessary.     Electronically signed by Fawad Christian MD, 05/02/23, 12:27 PM EDT.

## 2023-05-02 NOTE — PLAN OF CARE
Goal Outcome Evaluation:  Plan of Care Reviewed With: patient        Progress: no change  Outcome Evaluation: VSS. NPO since midnight. Patient rested well this shift.

## 2023-05-02 NOTE — ANESTHESIA POSTPROCEDURE EVALUATION
Patient: Lamar Vee    Procedure Summary     Date: 05/02/23 Room / Location: [facility] ENDOSCOPY 3 / [facility] ENDOSCOPY    Anesthesia Start: 1232 Anesthesia Stop: 1303    Procedure: BRONCHOSCOPY WITH BRONCHOALVEOLAR LAVAGE, BIOPSIES, BRUSHING, BRONCHIAL WASHING (Bronchus) Diagnosis:       Pneumonia of both lungs due to infectious organism, unspecified part of lung      Pulmonary sarcoidosis      Eosinophilic asthma      Pulmonary infiltrate      Patellar instability of right knee      Right knee pain, unspecified chronicity      (Pneumonia of both lungs due to infectious organism, unspecified part of lung [J18.9])      (Pulmonary sarcoidosis [D86.0])      (Eosinophilic asthma [J82.83])      (Pulmonary infiltrate [R91.8])      (Patellar instability of right knee [M25.361])      (Right knee pain, unspecified chronicity [M25.561])    Surgeons: Fawad Christian MD Provider: Douglas Kingsley MD    Anesthesia Type: general ASA Status: 4          Anesthesia Type: general    Vitals  Vitals Value Taken Time   /66 05/02/23 1316   Temp 36.7 °C (98 °F) 05/02/23 1316   Pulse 100 05/02/23 1316   Resp 18 05/02/23 1316   SpO2 97 % 05/02/23 1316           Post Anesthesia Care and Evaluation    Patient location during evaluation: bedside  Patient participation: complete - patient participated  Level of consciousness: awake  Pain management: adequate    Airway patency: patent  PONV Status: none  Cardiovascular status: acceptable and stable  Respiratory status: acceptable  Hydration status: acceptable    Comments: An Anesthesiologist personally participated in the most demanding procedures (including induction and emergence if applicable) in the anesthesia plan, monitored the course of anesthesia administration at frequent intervals and remained physically present and available for immediate diagnosis and treatment of emergencies.

## 2023-05-03 VITALS
RESPIRATION RATE: 20 BRPM | SYSTOLIC BLOOD PRESSURE: 121 MMHG | WEIGHT: 147.93 LBS | OXYGEN SATURATION: 95 % | TEMPERATURE: 97.3 F | BODY MASS INDEX: 27.93 KG/M2 | HEIGHT: 61 IN | DIASTOLIC BLOOD PRESSURE: 68 MMHG | HEART RATE: 83 BPM

## 2023-05-03 LAB
ANION GAP SERPL CALCULATED.3IONS-SCNC: 7.8 MMOL/L (ref 5–15)
BUN SERPL-MCNC: 10 MG/DL (ref 6–20)
BUN/CREAT SERPL: 12 (ref 7–25)
CALCIUM SPEC-SCNC: 8.8 MG/DL (ref 8.6–10.5)
CHLORIDE SERPL-SCNC: 98 MMOL/L (ref 98–107)
CO2 SERPL-SCNC: 31.2 MMOL/L (ref 22–29)
CREAT SERPL-MCNC: 0.83 MG/DL (ref 0.57–1)
CYTO UR: NORMAL
DEPRECATED RDW RBC AUTO: 38.5 FL (ref 37–54)
EGFRCR SERPLBLD CKD-EPI 2021: 83.9 ML/MIN/1.73
ERYTHROCYTE [DISTWIDTH] IN BLOOD BY AUTOMATED COUNT: 12.3 % (ref 12.3–15.4)
GLUCOSE SERPL-MCNC: 94 MG/DL (ref 65–99)
HCT VFR BLD AUTO: 36.7 % (ref 34–46.6)
HGB BLD-MCNC: 12.1 G/DL (ref 12–15.9)
LAB AP CASE REPORT: NORMAL
LAB AP CLINICAL INFORMATION: NORMAL
MAGNESIUM SERPL-MCNC: 1.9 MG/DL (ref 1.6–2.6)
MCH RBC QN AUTO: 28.1 PG (ref 26.6–33)
MCHC RBC AUTO-ENTMCNC: 33 G/DL (ref 31.5–35.7)
MCV RBC AUTO: 85.2 FL (ref 79–97)
PATH REPORT.FINAL DX SPEC: NORMAL
PATH REPORT.GROSS SPEC: NORMAL
PLATELET # BLD AUTO: 247 10*3/MM3 (ref 140–450)
PMV BLD AUTO: 10.3 FL (ref 6–12)
POTASSIUM SERPL-SCNC: 3.6 MMOL/L (ref 3.5–5.2)
RBC # BLD AUTO: 4.31 10*6/MM3 (ref 3.77–5.28)
SODIUM SERPL-SCNC: 137 MMOL/L (ref 136–145)
WBC NRBC COR # BLD: 6.07 10*3/MM3 (ref 3.4–10.8)

## 2023-05-03 PROCEDURE — 94799 UNLISTED PULMONARY SVC/PX: CPT

## 2023-05-03 PROCEDURE — 25010000002 CEFTRIAXONE PER 250 MG: Performed by: INTERNAL MEDICINE

## 2023-05-03 PROCEDURE — 63710000001 REVEFENACIN 175 MCG/3ML SOLUTION: Performed by: INTERNAL MEDICINE

## 2023-05-03 PROCEDURE — 25010000002 HEPARIN (PORCINE) PER 1000 UNITS: Performed by: INTERNAL MEDICINE

## 2023-05-03 PROCEDURE — 94664 DEMO&/EVAL PT USE INHALER: CPT

## 2023-05-03 PROCEDURE — 99239 HOSP IP/OBS DSCHRG MGMT >30: CPT | Performed by: INTERNAL MEDICINE

## 2023-05-03 PROCEDURE — 83735 ASSAY OF MAGNESIUM: CPT | Performed by: INTERNAL MEDICINE

## 2023-05-03 PROCEDURE — 99233 SBSQ HOSP IP/OBS HIGH 50: CPT | Performed by: INTERNAL MEDICINE

## 2023-05-03 PROCEDURE — 85027 COMPLETE CBC AUTOMATED: CPT | Performed by: INTERNAL MEDICINE

## 2023-05-03 PROCEDURE — 25010000002 METHYLPREDNISOLONE PER 40 MG: Performed by: INTERNAL MEDICINE

## 2023-05-03 PROCEDURE — 80048 BASIC METABOLIC PNL TOTAL CA: CPT | Performed by: INTERNAL MEDICINE

## 2023-05-03 RX ORDER — LINEZOLID 600 MG/1
600 TABLET, FILM COATED ORAL 2 TIMES DAILY
Qty: 28 TABLET | Refills: 0 | Status: SHIPPED | OUTPATIENT
Start: 2023-05-03 | End: 2023-05-17

## 2023-05-03 RX ORDER — LINEZOLID 600 MG/1
600 TABLET, FILM COATED ORAL 2 TIMES DAILY
Status: DISCONTINUED | OUTPATIENT
Start: 2023-05-03 | End: 2023-05-03 | Stop reason: HOSPADM

## 2023-05-03 RX ORDER — PREDNISONE 10 MG/1
TABLET ORAL
Qty: 40 TABLET | Refills: 0 | Status: SHIPPED | OUTPATIENT
Start: 2023-05-03 | End: 2023-05-19

## 2023-05-03 RX ADMIN — GUAIFENESIN AND CODEINE PHOSPHATE 5 ML: 10; 100 LIQUID ORAL at 08:39

## 2023-05-03 RX ADMIN — CEFTRIAXONE SODIUM 1 G: 1 INJECTION, SOLUTION INTRAVENOUS at 00:45

## 2023-05-03 RX ADMIN — CLONAZEPAM 1 MG: 0.5 TABLET ORAL at 01:03

## 2023-05-03 RX ADMIN — BUPRENORPHINE 24 MG: 8 TABLET SUBLINGUAL at 08:37

## 2023-05-03 RX ADMIN — Medication 10 ML: at 09:37

## 2023-05-03 RX ADMIN — Medication: at 09:36

## 2023-05-03 RX ADMIN — REVEFENACIN 175 MCG: 175 SOLUTION RESPIRATORY (INHALATION) at 07:12

## 2023-05-03 RX ADMIN — GUAIFENESIN AND CODEINE PHOSPHATE 5 ML: 10; 100 LIQUID ORAL at 05:04

## 2023-05-03 RX ADMIN — HEPARIN SODIUM 5000 UNITS: 5000 INJECTION INTRAVENOUS; SUBCUTANEOUS at 05:00

## 2023-05-03 RX ADMIN — ARFORMOTEROL TARTRATE 15 MCG: 15 SOLUTION RESPIRATORY (INHALATION) at 07:13

## 2023-05-03 RX ADMIN — LEVOTHYROXINE SODIUM 100 MCG: 100 TABLET ORAL at 05:00

## 2023-05-03 RX ADMIN — LEVALBUTEROL HYDROCHLORIDE 1.25 MG: 1.25 SOLUTION RESPIRATORY (INHALATION) at 07:12

## 2023-05-03 RX ADMIN — PANTOPRAZOLE SODIUM 40 MG: 40 TABLET, DELAYED RELEASE ORAL at 06:01

## 2023-05-03 RX ADMIN — LAMOTRIGINE 200 MG: 100 TABLET ORAL at 08:37

## 2023-05-03 RX ADMIN — CETIRIZINE HYDROCHLORIDE 10 MG: 10 TABLET, FILM COATED ORAL at 08:37

## 2023-05-03 RX ADMIN — FUROSEMIDE 20 MG: 20 TABLET ORAL at 08:37

## 2023-05-03 RX ADMIN — BUDESONIDE 0.5 MG: 0.5 SUSPENSION RESPIRATORY (INHALATION) at 07:13

## 2023-05-03 RX ADMIN — METHYLPREDNISOLONE SODIUM SUCCINATE 40 MG: 40 INJECTION INTRAMUSCULAR; INTRAVENOUS at 06:01

## 2023-05-03 NOTE — DISCHARGE SUMMARY
[facility]         HOSPITALIST  DISCHARGE SUMMARY    Patient Name: Lamar Vee  : 1970  MRN: [id number]    Date of Admission: 2023  Date of Discharge:  23  Primary Care Physician: Everett Tapia MD    Consultants:  -Pulmonology/Critical Care: Dr. Fawad Christian    [facility] Problems:  MRSA Pneumonia  Mycoplasma pneumonia  Parainfluenza  Acute hypoxic respiratory failure  Acute exacerbation of eosinophilic asthma  Issues with airway clearance  Recent COVID-19 infection (2023)  History of sarcoidosis  History of opioid abuse  Hypothyroidism  Anxiety    Hospital Course     Hospital Course:  Lamar Vee is a 52 y.o. female  with hypertension, sarcoidosis, asthma, seasonal allergies and history of substance abuse who presented to the ED in [facility] with complaints of shortness of breath, cough and chest congestion.  CXR showed bilateral infiltrates.  Patient found to be hypoxic requiring supplemental O2.  Transferred to [facility] for pulmonology assessment requested patient started on empiric antibiotics, systemic steroids, inhaled bronchodilators.  Pneumococcal IgM positive.  Respiratory panel positive for parainfluenza virus.  Patient continued to have shortness of breath and cough and underwent bronchoscopy on 2023.  Pneumonia panel from bronchoscopy returned positive for MRSA pneumonia.  After discussion with pulmonology service decision was made for patient to complete 14 days of antibiotic treatment with linezolid.  While on the linezolid patient is to hold restarting home amitriptyline and bupropion.  Per pulmonology recommendations patient also started on Spiriva and is to complete prednisone taper over 2 weeks as well as continuing home Symbicort and albuterol.  Prior to discharge home 6-minute walk test performed to evaluate need for home O2 and patient passed. Patient hemodynamically stable and no additional inpatient  evaluation or work-up necessary at this time, patient discharged home with outpatient follow-up.    DISCHARGE Follow Up Recommendations for labs and diagnostics:   -Follow-up with PCP in 3 to 5 days  -Follow-up with pulmonology in 2 weeks    Day of Discharge     Vital Signs:  Temp:  [97.3 °F (36.3 °C)-98.1 °F (36.7 °C)] 97.3 °F (36.3 °C)  Heart Rate:  [] 83  Resp:  [18-20] 20  BP: ()/(50-70) 121/68  Flow (L/min):  [2] 2  Physical Exam:   Gen: No acute distress, sitting up in bed, pleasant, conversant  Resp: Coarse bilateral rhonchi noted improved, no increase in work of breathing  Card: RRR, No m/r/g  Abd: Soft, Nontender, Nondistended, + bowel sounds    Discharge Details        Discharge Medications      New Medications      Instructions Start Date   linezolid 600 MG tablet  Commonly known as: ZYVOX   600 mg, Oral, 2 Times Daily      tiotropium 18 MCG per inhalation capsule  Commonly known as: SPIRIVA   1 capsule, Inhalation, Daily         Changes to Medications      Instructions Start Date   predniSONE 10 MG tablet  Commonly known as: DELTASONE  What changed: See the new instructions.   Take 4 tablets by mouth Daily for 4 days, THEN 3 tablets Daily for 4 days, THEN 2 tablets Daily for 4 days, THEN 1 tablet Daily for 4 days.   Start Date: May 3, 2023        Continue These Medications      Instructions Start Date   benzonatate 200 MG capsule  Commonly known as: TESSALON   200 mg, Oral, 3 Times Daily PRN      budesonide-formoterol 160-4.5 MCG/ACT inhaler  Commonly known as: Symbicort   2 puffs, Inhalation, 2 Times Daily - RT      buprenorphine 8 MG sublingual tablet SL tablet  Commonly known as: SUBUTEX   Dissolve THREE tablets UNDER THE TONGUE EVERY DAY      cetirizine 10 MG tablet  Commonly known as: zyrTEC   Take 1 tablet by mouth Daily.      clonazePAM 1 MG tablet  Commonly known as: KlonoPIN   Take 1 tablet by mouth 2 (Two) Times a Day.      Fasenra Pen 30 MG/ML solution auto-injector  Generic drug:  Benralizumab   30 mg, Subcutaneous, Every 2 Months      fluticasone 50 MCG/ACT nasal spray  Commonly known as: Flonase   2 sprays, Nasal, Daily      furosemide 20 MG tablet  Commonly known as: LASIX   20 mg, Oral, Daily      ibuprofen 600 MG tablet  Commonly known as: ADVIL,MOTRIN   Take 1 tablet by mouth Every 8 (Eight) Hours As Needed for Mild Pain.      levothyroxine 100 MCG tablet  Commonly known as: SYNTHROID, LEVOTHROID   100 mcg, Oral, Every Early Morning      Linzess 290 MCG capsule capsule  Generic drug: linaclotide   290 mcg, Oral, Daily      Nurtec 75 MG tablet dispersible tablet  Generic drug: Rimegepant Sulfate   75 mg, Oral, As Needed      ondansetron ODT 8 MG disintegrating tablet  Commonly known as: ZOFRAN-ODT   Place 1 tablet on the tongue Every 12 (Twelve) Hours As Needed for Nausea or Vomiting.      pantoprazole 40 MG EC tablet  Commonly known as: PROTONIX   40 mg, Oral, Daily      ProAir RespiClick 108 (90 Base) MCG/ACT inhaler  Generic drug: albuterol   USE ONE PUFF FOUR TIMES DAILY      albuterol sulfate  (90 Base) MCG/ACT inhaler  Commonly known as: PROVENTIL HFA;VENTOLIN HFA;PROAIR HFA   INHALE TWO PUFFS BY MOUTH EVERY 4 HOURS      rOPINIRole 2 MG tablet  Commonly known as: REQUIP   TAKE ONE TABLET BY MOUTH AT BEDTIME      tiZANidine 4 MG tablet  Commonly known as: ZANAFLEX   Take 2 tablets by mouth Every 8 (Eight) Hours As Needed for Muscle Spasms.         Stop These Medications    amitriptyline 100 MG tablet  Commonly known as: ELAVIL     buPROPion  MG 24 hr tablet  Commonly known as: WELLBUTRIN XL     ipratropium-albuterol 0.5-2.5 mg/3 ml nebulizer  Commonly known as: DUO-NEB     lamoTRIgine 200 MG tablet  Commonly known as: LaMICtal     montelukast 10 MG tablet  Commonly known as: SINGULAIR     promethazine-dextromethorphan 6.25-15 MG/5ML syrup  Commonly known as: PROMETHAZINE-DM            Allergies   Allergen Reactions   • Morphine Headache   • Naloxone    • Pentazocine  Unknown - Low Severity   • Tetanus Toxoids    • Toradol [Ketorolac Tromethamine]    • Tyloxapol        Discharge Disposition:  Home or Self Care    Diet:  Hospital:  Diet Order   Procedures   • Diet: Regular/House Diet; Texture: Regular Texture (IDDSI 7); Fluid Consistency: Thin (IDDSI 0)       Discharge Activity:   Activity Instructions     Activity as Tolerated            CODE STATUS:  Code Status and Medical Interventions:   Ordered at: 04/30/23 0027     Code Status (Patient has no pulse and is not breathing):    CPR (Attempt to Resuscitate)     Medical Interventions (Patient has pulse or is breathing):    Full Support       Future Appointments   Date Time Provider Department Center   6/16/2023 11:30 AM Rebecca Cole APRN [facility]       Additional Instructions for the Follow-ups that You Need to Schedule     Discharge Follow-up with PCP   As directed       Currently Documented PCP:    Everett Tapia MD    PCP Phone Number:    [phone]     Follow Up Details: Follow-up in 3-5 days         Discharge Follow-up with Specified Provider: Dr. Fawad Christian; 2 Weeks   As directed      To: Dr. Fawad Christian    Follow Up: 2 Weeks               Pertinent  and/or Most Recent Results     PROCEDURES:   -Bronchoscopy (05/02/2023)    LAB RESULTS:      Lab 05/03/23 0454 05/01/23 0540 04/30/23 0129 04/30/23 0128   WBC 6.07 7.88  --  6.82   HEMOGLOBIN 12.1 11.4*  --  12.6   HEMATOCRIT 36.7 34.8  --  37.7   PLATELETS 247 230  --  225   NEUTROS ABS  --  7.10*  --  6.40   IMMATURE GRANS (ABS)  --  0.05  --  0.06*   LYMPHS ABS  --  0.39*  --  0.28*   MONOS ABS  --  0.33  --  0.08*   EOS ABS  --  0.00  --  0.00   MCV 85.2 86.1  --  84.5   PROCALCITONIN  --   --  0.10  --          Lab 05/03/23  0454 05/01/23  0540 04/30/23 0129 04/30/23 0128   SODIUM 137 140  --  138   POTASSIUM 3.6 4.5  --  3.3*   CHLORIDE 98 104  --  99   CO2 31.2* 26.3  --  26.5   ANION GAP 7.8 9.7  --  12.5   BUN 10 12  --  9   CREATININE 0.83 0.68   --  0.75   EGFR 83.9 103.6  --  94.7   GLUCOSE 94 126*  --  151*   CALCIUM 8.8 8.9  --  9.1   MAGNESIUM 1.9  --  1.9  --              Lab 04/30/23  0129   PROBNP 414.7                 Brief Urine Lab Results     None        Microbiology Results (last 10 days)     Procedure Component Value - Date/Time    Gram Stain - No Culture [[id number]] Collected: 05/02/23 1254    Lab Status: Final result Specimen: Brushing from Lung, Right Upper Lobe Updated: 05/02/23 1433     Gram Stain No organisms seen    AFB Stain - No Culture - Brushing, Lung, Right Upper Lobe [[id number]] Collected: 05/02/23 1254    Lab Status: Final result Specimen: Brushing from Lung, Right Upper Lobe Updated: 05/02/23 1416     AFB Stain No acid fast bacilli seen    AFB Culture - Wash, Bronchus [[id number]] Collected: 05/02/23 1250    Lab Status: Preliminary result Specimen: Wash from Bronchus Updated: 05/03/23 0944     AFB Stain No acid fast bacilli seen on direct smear      No acid fast bacilli seen on concentrated smear    Respiratory Culture - Wash, Bronchus [[id number]]  (Abnormal) Collected: 05/02/23 1250    Lab Status: Preliminary result Specimen: Wash from Bronchus Updated: 05/03/23 0830     Respiratory Culture Scant growth (1+) Staphylococcus aureus      Scant growth (1+) Yeast isolated     Gram Stain Few (2+) WBCs seen      Rare (1+) Gram positive cocci in clusters    AFB Culture - Lavage, Lung, Right Upper Lobe [[id number]] Collected: 05/02/23 1247    Lab Status: Preliminary result Specimen: Lavage from Lung, Right Upper Lobe Updated: 05/03/23 0944     AFB Stain No acid fast bacilli seen on direct smear      No acid fast bacilli seen on concentrated smear    BAL Culture, Quantitative - Lavage, Lung, Right Upper Lobe [[id number]] Collected: 05/02/23 1247    Lab Status: Preliminary result Specimen: Lavage from Lung, Right Upper Lobe Updated: 05/03/23 0830     BAL Culture No growth     Gram Stain Few (2+) WBCs seen      No organisms seen    Pneumonia  Panel - Lavage, Lung, Right Upper Lobe [[id number]]  (Abnormal) Collected: 05/02/23 1247    Lab Status: Final result Specimen: Lavage from Lung, Right Upper Lobe Updated: 05/02/23 1537     Escherichia coli PCR Not Detected     Acinetobacter calcoaceticus-baumannii complex PCR Not Detected     Enterobacter cloacae PCR Not Detected     Klebsiella oxytoca PCR Not Detected     Klebsiella pneumoniae group PCR Not Detected     Klebsiella aerogenes PCR Not Detected     Moraxella catarrhalis PCR Not Detected     Proteus species PCR Not Detected     Pseudomonas aeroginosa PCR Not Detected     Serratia marcescens PCR Not Detected     Staphylococcus aureus PCR Detected     Comment: 10^4 Bin Copies/mL        Streptococcus pyogenes PCR Not Detected     Haemophilus influenzae PCR Not Detected     Streptococcus agalactiae PCR Not Detected     Streptococcus pneumoniae PCR Not Detected     Chlamydophila pneumoniae PCR Not Detected     Legionella pneumophilia PCR Not Detected     Mycoplasma pneumo by PCR Not Detected     ADENOVIRUS, PCR Not Detected     CTX-M Gene N/A     IMP Gene N/A     KPC Gene N/A     mecA/C and MREJ Gene Detected     NDM Gene N/A     OXA-48-like Gene N/A     VIM Gene N/A     Coronavirus Not Detected     Human Metapneumovirus Not Detected     Human Rhinovirus/Enterovirus Not Detected     Influenza A PCR Not Detected     Influenza B PCR Not Detected     RSV, PCR Not Detected     Parainfluenza virus PCR Detected    Mycoplasma Pneumoniae Antibody, IgM - Blood, Arm, Left [[id number]]  (Abnormal) Collected: 05/01/23 0916    Lab Status: Final result Specimen: Blood from Arm, Left Updated: 05/01/23 1031     Mycoplasma pneumo IgM Positive    Respiratory Panel PCR w/COVID-19(SARS-CoV-2) KITTY/NOEMI/MEL/PAD/COR/MAD/IVAN In-House, NP Swab in UTM/VTM, 3-4 HR TAT - Swab, Nasopharynx [[id number]]  (Abnormal) Collected: 05/01/23 0906    Lab Status: Final result Specimen: Swab from Nasopharynx Updated: 05/01/23 1037     ADENOVIRUS, PCR  Not Detected     Coronavirus 229E Not Detected     Coronavirus HKU1 Not Detected     Coronavirus NL63 Not Detected     Coronavirus OC43 Not Detected     COVID19 Not Detected     Human Metapneumovirus Not Detected     Human Rhinovirus/Enterovirus Not Detected     Influenza B PCR Not Detected     Parainfluenza Virus 1 Detected     Parainfluenza Virus 2 Not Detected     Parainfluenza Virus 3 Not Detected     Parainfluenza Virus 4 Not Detected     RSV, PCR Not Detected     Bordetella pertussis pcr Not Detected     Bordetella parapertussis PCR Not Detected     Chlamydophila pneumoniae PCR Not Detected     Mycoplasma pneumo by PCR Not Detected    Narrative:      In the setting of a positive respiratory panel with a viral infection PLUS a negative procalcitonin without other underlying concern for bacterial infection, consider observing off antibiotics or discontinuation of antibiotics and continue supportive care. If the respiratory panel is positive for atypical bacterial infection (Bordetella pertussis, Chlamydophila pneumoniae, or Mycoplasma pneumoniae), consider antibiotic de-escalation to target atypical bacterial infection.    Legionella Antigen, Urine - Urine, Urine, Clean Catch [[id number]]  (Normal) Collected: 04/30/23 0611    Lab Status: Final result Specimen: Urine, Clean Catch Updated: 04/30/23 0723     LEGIONELLA ANTIGEN, URINE Negative    S. Pneumo Ag Urine or CSF - Urine, Urine, Clean Catch [[id number]]  (Normal) Collected: 04/30/23 0611    Lab Status: Final result Specimen: Urine, Clean Catch Updated: 04/30/23 0722     Strep Pneumo Ag Negative    Blood Culture - Blood, Arm, Left [[id number]]  (Normal) Collected: 04/30/23 0131    Lab Status: Preliminary result Specimen: Blood from Arm, Left Updated: 05/03/23 0145     Blood Culture No growth at 3 days    Blood Culture - Blood, Hand, Right [[id number]]  (Normal) Collected: 04/30/23 0130    Lab Status: Preliminary result Specimen: Blood from Hand, Right Updated:  05/03/23 0145     Blood Culture No growth at 3 days    Mycoplasma Pneumoniae Antibody, IgM - Blood, Arm, Left [[id number]]  (Abnormal) Collected: 04/30/23 0129    Lab Status: Final result Specimen: Blood from Arm, Left Updated: 04/30/23 0212     Mycoplasma pneumo IgM Positive                        Labs Pending at Discharge:  Pending Labs     Order Current Status    Fungus Culture - Lavage, Lung, Right Upper Lobe In process    Fungus Culture - Wash, Bronchus In process    Non-gynecologic Cytology In process    AFB Culture - Lavage, Lung, Right Upper Lobe Preliminary result    AFB Culture - Wash, Bronchus Preliminary result    BAL Culture, Quantitative - Lavage, Lung, Right Upper Lobe Preliminary result    Blood Culture - Blood, Arm, Left Preliminary result    Blood Culture - Blood, Hand, Right Preliminary result    Respiratory Culture - Wash, Bronchus Preliminary result          Time spent on Discharge including face to face service: Greater than 30 minutes    Electronically signed by Taz Jennings MD, 05/03/23, 12:50 PM EDT.

## 2023-05-03 NOTE — NURSING NOTE
Exercise Oximetry    Patient Name:Lamar Vee   MRN: [id number]   Date: 05/03/23             ROOM AIR BASELINE   SpO2%    Heart Rate   Blood Pressure      EXERCISE ON ROOM AIR SpO2% EXERCISE ON O2 @   LPM SpO2%   1 MINUTE 98 1 MINUTE  ROOM AIR 98   2 MINUTES 98 2 MINUTES ROOM AIR 98   3 MINUTES 96 3 MINUTES ROOM AIR 96   4 MINUTES 96 4 MINUTES ROOM AIR 96   5 MINUTES 96 5 MINUTES ROOM AIR 96   6 MINUTES 89 6 MINUTES ROOM AIR 89              Distance Walked 50FT Distance Walked 200FT   Dyspnea (Jerome Scale)   Dyspnea (Jerome Scale)   Fatigue (Jerome Scale)  Fatigue (Jerome Scale)   SpO2% Post Exercise 89% SpO2% Post Exercise  89%   HR Post Exercise   HR Post Exercise   Time to Recovery  1 MINUTE Time to Recovery 1 MINUTE     Comments: Patient ambulated around unit on room air and at the last minute of ambulation de-satted to 89%. Patient was placed on 1LPM 02 and immediately sats. Came up to 98%.

## 2023-05-03 NOTE — PLAN OF CARE
Goal Outcome Evaluation:   VSS. Remains on 2-3LNC. Still significant wheezes after bronch. Will continue current plan of care.

## 2023-05-03 NOTE — PLAN OF CARE
Goal Outcome Evaluation:  Patient discharged home accompanied by family.  Home medications returned to patient.

## 2023-05-03 NOTE — PROGRESS NOTES
Pulmonary / Critical Care Progress Note      Patient Name: Lamar Vee  : 1970  MRN: [id number]  Primary Care Physician:  Everett Tapia MD  Date of admission: 2023    Subjective   Subjective   Follow-up for recurrent pneumonia, sarcoidosis, eosinophilic asthma    Over past 24 hours, continue ceftriaxone and azithromycin, underwent bronchoscopy for recurrent pneumonia and mucous plugging, pneumonia panel positive for MRSA and parainfluenza, BAL cultures and cytology pending.    No acute events overnight.     This morning,   Lying awake in bed on 2 L nasal cannula  Reports feeling better  Wheezing improved  Dyspnea improved, worsens with activity  Congested cough  No fever/chills  No chest pain  Requesting to go home    Review of Systems  General:  No Fatigue, No Fever.   HEENT: No dysphagia, No Visual Changes, no rhinorrhea  Respiratory:  + Productive cough, + Dyspnea, clear phlegm, No Pleuritic Pain, + wheezing, no hemoptysis  Cardiovascular: Denies chest pain, denies palpitations, + CAN, No Chest Pressure  Gastrointestinal:  No Abdominal Pain, No Nausea, No Vomiting, No Diarrhea  Genitourinary:  No Dysuria, No Frequency, No Hesitancy  Musculoskeletal: No muscle pain or swelling  Endocrine:  No Heat Intolerance, No Cold Intolerance  Hematologic:  No Bleeding, No Bruising  Psychiatric:  No Anxiety, No Depression  Neurologic:  No Confusion, no Dysarthria, No Headaches  Skin:  No Rash, No Open Wounds      Objective   Objective     Vitals:   Temp:  [97.3 °F (36.3 °C)-98.1 °F (36.7 °C)] 97.3 °F (36.3 °C)  Heart Rate:  [] 83  Resp:  [18-20] 20  BP: (102-142)/(52-69) 121/68  Flow (L/min):  [2] 2     Physical Exam   Vital Signs Reviewed   General:  WDWN female, Alert, NAD on 2 L NC  HEENT:  PERRL, EOMI.  OP, nares clear, no sinus tenderness  Neck:  Supple, no JVD, no thyromegaly  Lymph: no axillary, cervical, supraclavicular lymphadenopathy noted bilaterally  Chest:  good aeration, scattered  rhonchi bilaterally, tympanic to percussion bilaterally, no work of breathing noted  CV: RRR, no MGR, pulses 2+, equal  Abd:  Soft, NT, ND, + BS, no HSM  EXT:  no clubbing, no cyanosis, no edema, no joint tenderness  Neuro:  A&Ox3, CN grossly intact, no focal deficits  Skin: No rashes or lesions noted      Result Review    Result Review:  I have personally reviewed the results from the time of this admission to 5/3/2023 14:23 EDT and agree with these findings:  [x]  Laboratory  [x]  Microbiology  [x]  Radiology  []  EKG/Telemetry   []  Cardiology/Vascular   []  Pathology  []  Old records  []  Other:  Most notable findings include:     Mycoplasma pneumonia - positive  Respiratory panel - parainfluenza  Pneumonia panel - MRSA and parainfluenza positive     5/2 BAL cultures and cytology pending        Lab 05/03/23  0454 05/01/23  0540 04/30/23  0128   WBC 6.07 7.88 6.82   HEMOGLOBIN 12.1 11.4* 12.6   HEMATOCRIT 36.7 34.8 37.7   PLATELETS 247 230 225   SODIUM 137 140 138   POTASSIUM 3.6 4.5 3.3*   CHLORIDE 98 104 99   CO2 31.2* 26.3 26.5   BUN 10 12 9   CREATININE 0.83 0.68 0.75   GLUCOSE 94 126* 151*   CALCIUM 8.8 8.9 9.1         Assessment & Plan   Assessment / Plan     Active Hospital Problems:  Active Hospital Problems    Diagnosis    • **Pneumonia    • Pulmonary sarcoidosis    • Eosinophilic asthma    • Pulmonary infiltrate    • Patellar instability of right knee    • Right knee pain      Impression:   Mycoplasma pneumonia  MRSA pneumonia  Parainfluenza  Acute hypoxic respiratory failure  Acute exacerbation of eosinophilic asthma: On Fasenra  Issues with airway clearance  Recent COVID: February 2023  Hx of sarcoidosis: 2013, previously on methotrexate  Hx of opioid abuse: On Subutex    Plan:   -Currently on 2 L nasal cannula, continue to wean to maintain SPO2 greater than 90%  -Will need 6MWT prior to discharge  -5/2 underwent bronchoscopy, BAL cultures and cytology pending, pneumonia panel MRSA and parainfluenza  positive  -Recommend Zyvox for MRSA pneumonia x14 days, will need to hold Lamictal while taking  -Continue guaifenesin AC and Tessalon Perles for cough  -Recommend prednisone 40 mg 2-week taper upon discharge  -Continue Lasix 20 mg p.o. daily, trend electrolytes and renal function  -Continue Brovana, Pulmicort, Xopenex and Yupelri nebs  -Recommend addition of Spiriva to home Symbicort at discharge, continue albuterol as needed  -Continue bronchopulmonary hygiene  -Continue MetaNeb  -Encourage use of I-S/flutter valve  -Encourage mobilization, out of bed to chair  -Okay to discharge home from pulmonary standpoint  -We will need follow-up with pulmonology in 1 to 2 weeks    DVT prophylaxis:  Medical DVT prophylaxis orders are present.    CODE STATUS:   Code Status (Patient has no pulse and is not breathing): CPR (Attempt to Resuscitate)  Medical Interventions (Patient has pulse or is breathing): Full Support      I personally reviewed pertinent labs, imaging and provider notes. Discussed with bedside nurse and will discuss with primary service.     Electronically signed by OSMAN Luis, 05/03/23, 2:22 PM EDT.      This visit was performed by BOTH a physician and an APC. I personally evaluated and examined the patient. I performed all aspects of MDM as documented. , I have reviewed and confirmed the accuracy of the patient's history as documented in this note. and I have reexamined the patient and the results are consistent with the previously documented exam. I have updated the documentation as necessary.     Electronically signed by Fawad Christian MD, 05/03/23, 4:41 PM EDT.     Electronically signed by OSMAN Luis, 5/3/2023, 14:23 EDT.

## 2023-05-04 ENCOUNTER — READMISSION MANAGEMENT (OUTPATIENT)
Dept: CALL CENTER | Facility: HOSPITAL | Age: 53
End: 2023-05-04
Payer: COMMERCIAL

## 2023-05-04 LAB
BACTERIA SPEC AEROBE CULT: NORMAL
CYTO UR: NORMAL
GRAM STN SPEC: NORMAL
GRAM STN SPEC: NORMAL
LAB AP CASE REPORT: NORMAL
LAB AP CLINICAL INFORMATION: NORMAL
LAB AP SPECIAL STAINS: NORMAL
PATH REPORT.FINAL DX SPEC: NORMAL
PATH REPORT.GROSS SPEC: NORMAL

## 2023-05-04 NOTE — OUTREACH NOTE
Prep Survey    Flowsheet Row Responses   [facility] facility patient discharged from? [facility]   Is LACE score < 7 ? No   Eligibility Readm Mgmt   Discharge diagnosis MRSA Pneumonia   Does the patient have one of the following disease processes/diagnoses(primary or secondary)? Pneumonia   Does the patient have Home health ordered? No   Is there a DME ordered? No   Prep survey completed? Yes          KIMANI PAIZ - Registered Nurse

## 2023-05-05 LAB
BACTERIA SPEC AEROBE CULT: NORMAL
BACTERIA SPEC AEROBE CULT: NORMAL
BACTERIA SPEC RESP CULT: ABNORMAL
GRAM STN SPEC: ABNORMAL
GRAM STN SPEC: ABNORMAL

## 2023-05-07 LAB
FUNGUS WND CULT: NORMAL
FUNGUS WND CULT: NORMAL
MYCOBACTERIUM SPEC CULT: NORMAL
MYCOBACTERIUM SPEC CULT: NORMAL
NIGHT BLUE STAIN TISS: NORMAL

## 2023-05-09 LAB
MYCOBACTERIUM SPEC CULT: NORMAL
NIGHT BLUE STAIN TISS: NORMAL
NIGHT BLUE STAIN TISS: NORMAL

## 2023-05-10 LAB
FUNGUS WND CULT: ABNORMAL
FUNGUS WND CULT: ABNORMAL

## 2023-05-15 ENCOUNTER — READMISSION MANAGEMENT (OUTPATIENT)
Dept: CALL CENTER | Facility: HOSPITAL | Age: 53
End: 2023-05-15
Payer: COMMERCIAL

## 2023-05-15 LAB
MYCOBACTERIUM SPEC CULT: ABNORMAL
NIGHT BLUE STAIN TISS: ABNORMAL

## 2023-05-15 NOTE — OUTREACH NOTE
COPD/PN Week 2 Survey    Flowsheet Row Responses   [facility] patient discharged from? [facility]   Does the patient have one of the following disease processes/diagnoses(primary or secondary)? Pneumonia   Week 2 attempt successful? Yes   Call start time 1720   Call end time 1723   Discharge diagnosis MRSA Pneumonia   Week 2 call completed? Yes   Is the patient interested in additional calls from an ambulatory ?  NOTE:  applies to high risk patients requiring additional follow-up. No   Wrap up additional comments She is doing well she says, has seen her MDs.          Chetna TRUONG - Registered Nurse

## 2023-05-15 NOTE — PROGRESS NOTES
Primary Care Provider  Everett Tapia MD   Referring Provider  No ref. provider found      Patient Complaint  Hospital Follow Up Visit (MRSA Pneumonia /Post Bronch/Requesting note for work)      Subjective          Lamar Vee presents to [facility] PULMONARY & CRITICAL CARE MEDICINE      History of Presenting Illness  Lamar Vee is a 52 y.o. female patient of Dr. Field here for management of MRSA pneumonia, pulmonary sarcoidosis, asthma, chronic cough, and seasonal allergies/allergic rhinitis, here for hospital follow-up.    Patient was hospitalized at [facility] 4/29/2023 through 5/3/2023 for MRSA and mycoplasma pneumonia, acute hypoxic respiratory failure, and acute exacerbation of eosinophilic asthma.  She was a direct admit from [place], had presented to their ED with shortness of breath, cough, and chest congestion.  CXR showed bilateral infiltrates, patient found to be hypoxic and required supplemental oxygen.  She was transferred to [facility] for pulmonology assistance.  She was started on empiric antibiotics, IV steroids, and scheduled inhaled bronchodilators.  Respiratory panel positive for parainfluenza virus.  Patient continued to have shortness of breath and cough despite treatment, underwent bronchoscopy with Dr. Christian 5/2/2023 (see full results below).  Positive for MRSA pneumonia, patient started on 14 days of antibiotic treatment with linezolid.  Patient was instructed to hold home amitriptyline and bupropion while on linezolid.  Per pulmonology recommendation, patient was also started on Spiriva and prescribed prednisone taper over 2 weeks.  She was instructed to continue her usual home maintenance and rescue inhalers.  She passed her 6-minute walk test prior to discharge.  Patient improved back to baseline after bronchoscopy, was discharged home with instructions to follow-up with her primary care provider and in the pulmonology  "clinic.    Bronchoscopy 5/2/2023: AFB culture showed \"abnormal stain\", sent to [facility] lab for identification.  Fungal culture heavy growth Candida albicans.  BAL culture no growth at 2 weeks.  Respiratory culture positive for MRSA.  Surgical pathology of right upper lobe biopsy showed fragments of benign bronchial mucosa and alveolar lung parenchyma.  BAL and bronchial brushing cytology of the right upper lobe negative for malignant cells, there was mild acute inflammation and atypical bronchial epithelial cells present, likely reactive per report.    Patient states she is doing well since being discharged from the hospital, she feels ready to return to work.  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  Patient denies any fevers or chills.  The results of her bronchoscopy were discussed at today's visit.  She continues to take the Linezolid that was prescribed to her while in the hospital, patient reports that today is her last day of antibiotic.  Of note, patient underwent previous outpatient bronchoscopy January 2023, continued to have wheezing and cough productive of yellow sputum.  She was treated with a long course of antibiotics and improved.  She also had COVID infection March 2023.  She uses Symbicort and Spiriva daily as well as her albuterol rescue inhaler as needed.  She also takes Zyrtec and Flonase for seasonal allergies and allergic rhinitis.  Patient continues to do Fasenra injections for severe asthma.  She is a never smoker.  Patient denies any productive cough, hemoptysis, swollen lymph nodes, weight loss, or night sweats.  Overall, patient is doing well and has no additional concerns at this time.  Patient is able to perform ADLs without difficulty.  I have personally reviewed the review of systems, past family, social, medical and surgical histories; and agree with their findings.      Review of Systems    Review of Systems   Constitutional: Positive for " "fatigue (improving). Negative for activity change, chills, fever, unexpected weight gain and unexpected weight loss.   HENT: Negative for congestion, ear discharge, ear pain, mouth sores, postnasal drip, rhinorrhea, sinus pressure, sore throat, swollen glands and trouble swallowing.    Eyes: Negative for blurred vision, pain, discharge, itching and visual disturbance.   Respiratory: Negative for apnea, cough, chest tightness, shortness of breath, wheezing and stridor.    Cardiovascular: Negative for chest pain, palpitations and leg swelling.   Gastrointestinal: Negative for abdominal distention, abdominal pain, constipation, diarrhea, nausea, vomiting, GERD and indigestion.   Musculoskeletal: Negative for arthralgias, joint swelling and myalgias.   Skin: Negative for color change.   Neurological: Negative for dizziness, weakness, light-headedness and headache.      Sleep: Negative for Excessive daytime sleepiness  Negative for morning headaches  Negative for Snoring      Family History   Problem Relation Age of Onset   • Cancer Mother    • Cancer Maternal Aunt    • Heart disease Maternal Aunt    • Malig Hyperthermia Neg Hx         Social History     Socioeconomic History   • Marital status:    Tobacco Use   • Smoking status: Never   • Smokeless tobacco: Never   Vaping Use   • Vaping Use: Never used   Substance and Sexual Activity   • Alcohol use: Not Currently   • Drug use: Not Currently     Comment: Pt states \"opiate addiction\".   • Sexual activity: Defer        Past Medical History:   Diagnosis Date   • Anxiety and depression    • Asthma    • Disease of thyroid gland    • H/O: substance abuse     opiates   • Hypertension    • Melanoma 1992   • Migraine    • Neuropathy    • Sarcoidosis    • Seasonal allergies         Immunization History   Administered Date(s) Administered   • COVID-19 (MODERNA) 1st,2nd,3rd Dose Monovalent 12/23/2020, 01/20/2021   • Flu Vaccine Quad PF >36MO 11/19/2014, 11/12/2015, " 09/13/2016, 09/19/2017   • FluLaval/Fluzone >6mos 11/19/2014, 11/12/2015, 09/13/2016, 09/19/2017   • Flublok 18+yrs 11/01/2022   • Pneumococcal Conjugate 13-Valent (PCV13) 09/13/2016, 09/19/2017   • Pneumococcal Polysaccharide (PPSV23) 11/19/2014       Allergies   Allergen Reactions   • Morphine Headache   • Naloxone    • Pentazocine Unknown - Low Severity   • Tetanus Toxoids    • Toradol [Ketorolac Tromethamine]    • Tyloxapol           Current Outpatient Medications:   •  albuterol sulfate  (90 Base) MCG/ACT inhaler, INHALE TWO PUFFS BY MOUTH EVERY 4 HOURS, Disp: 8.5 g, Rfl: 11  •  Benralizumab (Fasenra Pen) 30 MG/ML solution auto-injector, Inject 30 mg under the skin into the appropriate area as directed Every 2 (Two) Months., Disp: 1 mL, Rfl: 6  •  budesonide-formoterol (Symbicort) 160-4.5 MCG/ACT inhaler, Inhale 2 puffs 2 (Two) Times a Day., Disp: 3 each, Rfl: 3  •  buprenorphine (SUBUTEX) 8 MG sublingual tablet SL tablet, Dissolve THREE tablets UNDER THE TONGUE EVERY DAY, Disp: , Rfl: 0  •  clonazePAM (KlonoPIN) 1 MG tablet, Take 1 tablet by mouth 2 (Two) Times a Day., Disp: , Rfl: 0  •  fluticasone (Flonase) 50 MCG/ACT nasal spray, 2 sprays into the nostril(s) as directed by provider Daily., Disp: 1 g, Rfl: 3  •  furosemide (LASIX) 20 MG tablet, Take 1 tablet by mouth Daily., Disp: , Rfl:   •  ibuprofen (ADVIL,MOTRIN) 600 MG tablet, Take 1 tablet by mouth Every 8 (Eight) Hours As Needed for Mild Pain., Disp: , Rfl: 1  •  levothyroxine (SYNTHROID, LEVOTHROID) 100 MCG tablet, Take 1 tablet by mouth Every Morning., Disp: , Rfl:   •  linezolid (ZYVOX) 600 MG tablet, Take 1 tablet by mouth 2 (Two) Times a Day for 28 doses. Indications: Pneumonia, Disp: 28 tablet, Rfl: 0  •  Linzess 290 MCG capsule capsule, Take 1 capsule by mouth Daily., Disp: , Rfl:   •  ondansetron ODT (ZOFRAN-ODT) 8 MG disintegrating tablet, Place 1 tablet on the tongue Every 12 (Twelve) Hours As Needed for Nausea or Vomiting., Disp: ,  "Rfl:   •  predniSONE (DELTASONE) 10 MG tablet, Take 4 tablets by mouth Daily for 4 days, THEN 3 tablets Daily for 4 days, THEN 2 tablets Daily for 4 days, THEN 1 tablet Daily for 4 days., Disp: 40 tablet, Rfl: 0  •  Rimegepant Sulfate (Nurtec) 75 MG tablet dispersible tablet, Take 1 tablet by mouth As Needed (Migraine)., Disp: , Rfl:   •  tiotropium (SPIRIVA) 18 MCG per inhalation capsule, Place 1 capsule into inhaler and inhale Daily for 30 days., Disp: 30 capsule, Rfl: 0  •  tiZANidine (ZANAFLEX) 4 MG tablet, Take 2 tablets by mouth Every 8 (Eight) Hours As Needed for Muscle Spasms., Disp: , Rfl: 5  •  benzonatate (TESSALON) 200 MG capsule, Take 1 capsule by mouth 3 (Three) Times a Day As Needed. (Patient not taking: Reported on 5/16/2023), Disp: , Rfl:   •  cetirizine (zyrTEC) 10 MG tablet, Take 1 tablet by mouth Daily. (Patient not taking: Reported on 5/16/2023), Disp: , Rfl: 3  •  pantoprazole (PROTONIX) 40 MG EC tablet, Take 1 tablet by mouth Daily. (Patient not taking: Reported on 5/16/2023), Disp: , Rfl:   •  PROAIR RESPICLICK 108 (90 BASE) MCG/ACT inhaler, USE ONE PUFF FOUR TIMES DAILY (Patient not taking: Reported on 5/16/2023), Disp: , Rfl: 5  •  promethazine-dextromethorphan (PROMETHAZINE-DM) 6.25-15 MG/5ML syrup, 5 mL. (Patient not taking: Reported on 5/16/2023), Disp: , Rfl:   •  rOPINIRole (REQUIP) 2 MG tablet, TAKE ONE TABLET BY MOUTH AT BEDTIME (Patient not taking: Reported on 5/16/2023), Disp: , Rfl: 5     Objective     Vital Signs:   BP 98/52 (BP Location: Left arm, Patient Position: Sitting, Cuff Size: Adult)   Pulse 76   Temp 98.2 °F (36.8 °C) (Tympanic)   Resp 16   Ht 154.9 cm (61\")   Wt 69.5 kg (153 lb 3.2 oz)   SpO2 98% Comment: room air  BMI 28.95 kg/m²     Objective   Physical Exam  Constitutional:       General: She is not in acute distress.     Appearance: Normal appearance. She is normal weight. She is not ill-appearing.   HENT:      Right Ear: Tympanic membrane and ear canal " normal.      Left Ear: Tympanic membrane and ear canal normal.      Nose: Nose normal.      Mouth/Throat:      Mouth: Mucous membranes are moist.      Pharynx: Oropharynx is clear.   Eyes:      Extraocular Movements: Extraocular movements intact.      Conjunctiva/sclera: Conjunctivae normal.      Pupils: Pupils are equal, round, and reactive to light.   Cardiovascular:      Rate and Rhythm: Normal rate and regular rhythm.      Pulses: Normal pulses.      Heart sounds: Normal heart sounds.   Pulmonary:      Effort: Pulmonary effort is normal. No respiratory distress.      Breath sounds: No stridor. Wheezing (heard once, cleared with cough) present. No rhonchi or rales.   Abdominal:      General: Bowel sounds are normal.      Palpations: Abdomen is soft.   Musculoskeletal:         General: No swelling. Normal range of motion.      Cervical back: Normal range of motion and neck supple.      Right lower leg: No edema.      Left lower leg: No edema.   Skin:     General: Skin is warm and dry.   Neurological:      General: No focal deficit present.      Mental Status: She is alert and oriented to person, place, and time.      Motor: No weakness.   Psychiatric:         Mood and Affect: Mood normal.         Behavior: Behavior normal.        Result Review :   I have personally reviewed patient's labs and images.  I reviewed Dr. Christian's initial consult note 5/1/2023, Dr. Christian's bronchoscopy operative note 5/2/2023, Dr. Christian's final progress note 5/3/2023, and Dr. Jennings' discharge summary 5/3/2023.  I also reviewed Rebecca's last office note 1/27/2023.            Diagnoses and all orders for this visit:    1. Pneumonia due to methicillin resistant Staphylococcus aureus (MRSA), unspecified laterality, unspecified part of lung (Primary)    2. Moderate persistent asthma without complication    3. Eosinophilic asthma    4. Pulmonary sarcoidosis    5. Dyspnea on exertion    6. Chronic cough    7. Seasonal allergies    8. Allergic  rhinitis, unspecified seasonality, unspecified trigger       Impression and Plan    -Bronchoscopy positive for MRSA 5/2/2023, reminded patient that we will continue to watch these samples for a total of 6 weeks, will notify her if any other growth occurs.  -Continue taking Linezolid x 14 days as directed, wait to restart Lamictal until antibiotic completed  -Continue using Symbicort daily as prescribed, reminded patient to rinse mouth after each use  -Continue using Spiriva daily as prescribed  -Continue using albuterol rescue inhaler as needed  -Continue with Fasenra injections as directed  -Continue taking Zyrtec and Flonase for seasonal allergies and allergic rhinitis   -Continue taking guaifenesin and Tessalon as needed for cough  -Okay for patient to return to work, we will provide her with a work note today  -Patient to keep regularly scheduled appointment with Rebecca 6/16/2023, may return sooner if needed    Smoking status: Reviewed  Vaccination status: Patient reports she is up-to-date with her flu, pneumonia, and Covid vaccines.  Patient is advised to continue to follow CDC recommendations such as social distancing wearing a mask and washing hands for at least 20 seconds.  Medications personally reviewed    Follow Up   Return for Next scheduled follow up.  Patient was given instructions and counseling regarding her condition or for health maintenance advice. Please see specific information pulled into the AVS if appropriate.

## 2023-05-16 ENCOUNTER — OFFICE VISIT (OUTPATIENT)
Dept: PULMONOLOGY | Facility: CLINIC | Age: 53
End: 2023-05-16
Payer: COMMERCIAL

## 2023-05-16 VITALS
WEIGHT: 153.2 LBS | HEIGHT: 61 IN | OXYGEN SATURATION: 98 % | RESPIRATION RATE: 16 BRPM | HEART RATE: 76 BPM | DIASTOLIC BLOOD PRESSURE: 52 MMHG | SYSTOLIC BLOOD PRESSURE: 98 MMHG | BODY MASS INDEX: 28.92 KG/M2 | TEMPERATURE: 98.2 F

## 2023-05-16 DIAGNOSIS — D86.0 PULMONARY SARCOIDOSIS: ICD-10-CM

## 2023-05-16 DIAGNOSIS — R05.3 CHRONIC COUGH: ICD-10-CM

## 2023-05-16 DIAGNOSIS — J15.212 PNEUMONIA DUE TO METHICILLIN RESISTANT STAPHYLOCOCCUS AUREUS (MRSA), UNSPECIFIED LATERALITY, UNSPECIFIED PART OF LUNG: Primary | ICD-10-CM

## 2023-05-16 DIAGNOSIS — J45.40 MODERATE PERSISTENT ASTHMA WITHOUT COMPLICATION: ICD-10-CM

## 2023-05-16 DIAGNOSIS — J30.2 SEASONAL ALLERGIES: ICD-10-CM

## 2023-05-16 DIAGNOSIS — J82.83 EOSINOPHILIC ASTHMA: ICD-10-CM

## 2023-05-16 DIAGNOSIS — J30.9 ALLERGIC RHINITIS, UNSPECIFIED SEASONALITY, UNSPECIFIED TRIGGER: ICD-10-CM

## 2023-05-16 DIAGNOSIS — R06.09 DYSPNEA ON EXERTION: ICD-10-CM

## 2023-05-16 LAB
MYCOBACTERIUM SPEC CULT: NORMAL
NIGHT BLUE STAIN TISS: NORMAL
NIGHT BLUE STAIN TISS: NORMAL

## 2023-05-16 RX ORDER — DEXTROMETHORPHAN HYDROBROMIDE AND PROMETHAZINE HYDROCHLORIDE 15; 6.25 MG/5ML; MG/5ML
5 SYRUP ORAL
COMMUNITY
Start: 2023-01-10

## 2023-05-23 LAB
MYCOBACTERIUM SPEC CULT: NORMAL
NIGHT BLUE STAIN TISS: NORMAL
NIGHT BLUE STAIN TISS: NORMAL

## 2023-05-24 ENCOUNTER — TELEPHONE (OUTPATIENT)
Dept: PULMONOLOGY | Facility: CLINIC | Age: 53
End: 2023-05-24
Payer: COMMERCIAL

## 2023-05-24 NOTE — TELEPHONE ENCOUNTER
I have called and discussed with pt we are waiting on an AFB culture to come back from state [facility], it may take up to 12 days for an identification. Pt verbalized understanding.   Pt concerns is [facility] has contacted her advising her to get a CXR. Pt stated she spoke with mirella Lugo at [facility]. Marilou Hernandez is going to reach out to [facility].

## 2023-05-24 NOTE — TELEPHONE ENCOUNTER
Patient is upset, she stated that the [facility] Dept. Has called her about some test results and that she has not even heard about these test results om our office. She would like a phone call with the test results. She is scared, Thanks

## 2023-05-25 ENCOUNTER — TELEPHONE (OUTPATIENT)
Dept: PULMONOLOGY | Facility: CLINIC | Age: 53
End: 2023-05-25
Payer: COMMERCIAL

## 2023-05-25 DIAGNOSIS — A31.0 MYCOBACTERIUM AVIUM COMPLEX: Primary | ICD-10-CM

## 2023-05-25 LAB
MYCOBACTERIUM SPEC CULT: ABNORMAL
NIGHT BLUE STAIN TISS: ABNORMAL

## 2023-05-25 RX ORDER — RIFAMPIN 300 MG/1
600 CAPSULE ORAL 3 TIMES WEEKLY
Qty: 24 CAPSULE | Refills: 11 | Status: SHIPPED | OUTPATIENT
Start: 2023-05-26

## 2023-05-25 RX ORDER — AZITHROMYCIN 250 MG/1
500 TABLET, FILM COATED ORAL 3 TIMES WEEKLY
Qty: 30 TABLET | Refills: 11 | Status: SHIPPED | OUTPATIENT
Start: 2023-05-26

## 2023-05-25 RX ORDER — ETHAMBUTOL HYDROCHLORIDE 400 MG/1
1700 TABLET, FILM COATED ORAL 3 TIMES WEEKLY
Qty: 54 TABLET | Refills: 11 | Status: SHIPPED | OUTPATIENT
Start: 2023-05-26

## 2023-05-25 NOTE — TELEPHONE ENCOUNTER
Spoke with [facility] micro department.  MAC resulted from state ID.  Made patient aware.  OSMAN Barahona will initiate medication therapy.  Attempted to reach [facility] Department - left voice mail for return call.    Spoke to patient about results and plan of action.  Patient is agreeable.

## 2023-05-25 NOTE — TELEPHONE ENCOUNTER
Marilou has tried to call [facility] today and she was unable to speak with anyone. Marilou ask me to call pt back and let her know she she has MAC infection. I have called pt and informed. Pt is aware that Rosa has sent her medication to her pharmacy, rifampin,azithromycin and ethambutol.   Marilou stated Koko would call pt when she got back in office and explain the medication to her.   Pt verbalized understanding.

## 2023-05-25 NOTE — PROGRESS NOTES
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  Treatment discussed; azithromycin, ethambutol, and rifampin prescribed to be taken 3 times weekly for at least the next year.  Patient has regularly scheduled visit with Rebecca Cole 6/16/2023, will need to provide sputum sample and have labs done at that time.  Patient agreeable to this plan of care, verbalizes understanding.

## 2023-05-26 ENCOUNTER — TELEPHONE (OUTPATIENT)
Dept: PULMONOLOGY | Facility: CLINIC | Age: 53
End: 2023-05-26
Payer: COMMERCIAL

## 2023-05-26 NOTE — TELEPHONE ENCOUNTER
Called patient to inform her that 3 prescriptions had been e-scribed to her local preferred pharmacy. These 3 medications need to be taken 3 times per week for 1 year. Patient states she understands what to do, and she will go pick them up today. Instructed her to call me with any questions or concerns.

## 2023-05-30 LAB
MYCOBACTERIUM SPEC CULT: NORMAL
NIGHT BLUE STAIN TISS: NORMAL
NIGHT BLUE STAIN TISS: NORMAL

## 2023-05-31 LAB
FUNGUS WND CULT: ABNORMAL
FUNGUS WND CULT: ABNORMAL

## 2023-06-06 ENCOUNTER — TELEPHONE (OUTPATIENT)
Dept: PULMONOLOGY | Facility: CLINIC | Age: 53
End: 2023-06-06
Payer: COMMERCIAL

## 2023-06-06 LAB
MYCOBACTERIUM SPEC CULT: NORMAL
NIGHT BLUE STAIN TISS: NORMAL
NIGHT BLUE STAIN TISS: NORMAL

## 2023-06-06 NOTE — TELEPHONE ENCOUNTER
Spoke with patient. Patient had question about Bronch labs. She is on medication for MAC infection.

## 2023-06-13 LAB
MYCOBACTERIUM SPEC CULT: ABNORMAL
MYCOBACTERIUM SPEC CULT: NORMAL
NIGHT BLUE STAIN TISS: ABNORMAL
NIGHT BLUE STAIN TISS: NORMAL
NIGHT BLUE STAIN TISS: NORMAL

## 2023-06-14 ENCOUNTER — OFFICE VISIT (OUTPATIENT)
Dept: PULMONOLOGY | Facility: CLINIC | Age: 53
End: 2023-06-14
Payer: COMMERCIAL

## 2023-06-14 VITALS
SYSTOLIC BLOOD PRESSURE: 122 MMHG | DIASTOLIC BLOOD PRESSURE: 74 MMHG | BODY MASS INDEX: 28.36 KG/M2 | RESPIRATION RATE: 16 BRPM | HEIGHT: 61 IN | OXYGEN SATURATION: 95 % | TEMPERATURE: 98.1 F | WEIGHT: 150.2 LBS | HEART RATE: 87 BPM

## 2023-06-14 DIAGNOSIS — R09.89 CHEST CONGESTION: ICD-10-CM

## 2023-06-14 DIAGNOSIS — D86.0 PULMONARY SARCOIDOSIS: ICD-10-CM

## 2023-06-14 DIAGNOSIS — A31.0 MYCOBACTERIUM AVIUM COMPLEX: ICD-10-CM

## 2023-06-14 DIAGNOSIS — J82.83 EOSINOPHILIC ASTHMA: Primary | ICD-10-CM

## 2023-06-14 DIAGNOSIS — R06.09 DYSPNEA ON EXERTION: ICD-10-CM

## 2023-06-14 DIAGNOSIS — R06.2 WHEEZING: ICD-10-CM

## 2023-06-14 DIAGNOSIS — R05.3 CHRONIC COUGH: ICD-10-CM

## 2023-06-14 DIAGNOSIS — J30.2 SEASONAL ALLERGIES: ICD-10-CM

## 2023-06-14 DIAGNOSIS — J30.9 ALLERGIC RHINITIS, UNSPECIFIED SEASONALITY, UNSPECIFIED TRIGGER: ICD-10-CM

## 2023-06-14 RX ORDER — FLUCONAZOLE 150 MG/1
TABLET ORAL
COMMUNITY
Start: 2023-05-18 | End: 2023-06-14

## 2023-06-14 RX ORDER — PREDNISONE 50 MG/1
50 TABLET ORAL DAILY
Qty: 7 TABLET | Refills: 1 | Status: SHIPPED | OUTPATIENT
Start: 2023-06-14

## 2023-06-14 RX ORDER — TIOTROPIUM BROMIDE INHALATION SPRAY 3.12 UG/1
2 SPRAY, METERED RESPIRATORY (INHALATION)
Qty: 1 EACH | Refills: 11 | Status: SHIPPED | OUTPATIENT
Start: 2023-06-14

## 2023-06-14 RX ORDER — PREDNISONE 10 MG/1
TABLET ORAL
Qty: 45 TABLET | Refills: 0 | Status: CANCELLED | OUTPATIENT
Start: 2023-06-14

## 2023-06-14 RX ORDER — TIOTROPIUM BROMIDE INHALATION SPRAY 3.12 UG/1
2 SPRAY, METERED RESPIRATORY (INHALATION)
Qty: 2 EACH | Refills: 0 | COMMUNITY
Start: 2023-06-14

## 2023-06-14 RX ORDER — SACCHAROMYCES BOULARDII 250 MG
250 CAPSULE ORAL 2 TIMES DAILY
Qty: 30 CAPSULE | Refills: 11 | Status: SHIPPED | OUTPATIENT
Start: 2023-06-14

## 2023-06-14 NOTE — PROGRESS NOTES
Primary Care Provider  Everett Tapia MD     Referring Provider  No ref. provider found     Chief Complaint  Shortness of Breath, Cough, Wheezing, and Acute visit (X 2 days)    Subjective          Lamar Vee presents to [facility] PULMONARY & [facility]ARE MEDICINE  History of Present Illness  Lamar Vee is a 52 y.o. female patient of Dr. Field here for management of MAC infection, pulmonary sarcoidosis, asthma, chronic cough, and seasonal allergies/allergic rhinitis.    Patient dates that she is doing okay since her last visit.  Unfortunately she has had noticed more wheezing over the past several days.  She states that she did go to her primary care but they would not give her additional steroids due to her being on azithromycin, rifampin and ethambutol.  Patient does have a cough that is nonproductive.  She denies any current fevers or chills.  She continues to take Symbicort and says that she did stop the Spiriva HandiHaler because she did not like the taste of it.  She is open to trying Spiriva Respimat.  She has intermittently been using her albuterol, more often over the last several days.  He continues to take azithromycin, ethambutol and rifampin as scheduled.  She also continues her Fasenra injections.  Otherwise, the patient is doing well and has no additional concerns at this time.  She is able to perform her ADLs without difficulty.  She is up-to-date with her COVID, flu and pneumonia vaccines.     Her history of smoking is   Tobacco Use: Low Risk     Smoking Tobacco Use: Never    Smokeless Tobacco Use: Never    Passive Exposure: Never   .    Review of Systems   Constitutional:  Negative for chills, fatigue, fever, unexpected weight gain and unexpected weight loss.   HENT:  Negative for congestion (Nasal), hearing loss, mouth sores, nosebleeds, postnasal drip, sore throat and trouble swallowing.    Respiratory:  Positive for cough, shortness of breath and wheezing.  "Negative for apnea.         Negative for Hemoptysis     Cardiovascular:  Negative for chest pain, palpitations and leg swelling.   Gastrointestinal:  Negative for constipation, diarrhea, nausea, vomiting and GERD.   Skin:  Negative for color change.        Negative for cyanosis   Neurological:  Negative for syncope, weakness, numbness and headache.    Sleep: Negative for Excessive daytime sleepiness  Negative for morning headaches  Negative for Snoring    Family History   Problem Relation Age of Onset    Cancer Mother     Cancer Maternal Aunt     Heart disease Maternal Aunt     Malig Hyperthermia Neg Hx         Social History     Socioeconomic History    Marital status:    Tobacco Use    Smoking status: Never     Passive exposure: Never    Smokeless tobacco: Never   Vaping Use    Vaping Use: Never used   Substance and Sexual Activity    Alcohol use: Not Currently    Drug use: Not Currently     Comment: Pt states \"opiate addiction\".    Sexual activity: Defer        Past Medical History:   Diagnosis Date    Anxiety and depression     Asthma     Disease of thyroid gland     H/O: substance abuse     opiates    Hypertension     Melanoma 1992    Migraine     Neuropathy     Sarcoidosis     Seasonal allergies         Immunization History   Administered Date(s) Administered    COVID-19 (MODERNA) 1st,2nd,3rd Dose Monovalent 12/23/2020, 01/20/2021    Flu Vaccine Quad PF >36MO 11/19/2014, 11/12/2015, 09/13/2016, 09/19/2017    FluLaval/Fluzone >6mos 11/19/2014, 11/12/2015, 09/13/2016, 09/19/2017    Flublok 18+yrs 11/01/2022    Pneumococcal Conjugate 13-Valent (PCV13) 09/13/2016, 09/19/2017    Pneumococcal Polysaccharide (PPSV23) 11/19/2014         Allergies   Allergen Reactions    Morphine Headache    Naloxone     Pentazocine Unknown - Low Severity    Tetanus Toxoids     Toradol [Ketorolac Tromethamine]     Tyloxapol           Current Outpatient Medications:     albuterol sulfate  (90 Base) MCG/ACT inhaler, INHALE " TWO PUFFS BY MOUTH EVERY 4 HOURS, Disp: 8.5 g, Rfl: 11    azithromycin (ZITHROMAX) 250 MG tablet, Take 2 tablets by mouth 3 (Three) Times a Week., Disp: 30 tablet, Rfl: 11    Benralizumab (Fasenra Pen) 30 MG/ML solution auto-injector, Inject 30 mg under the skin into the appropriate area as directed Every 2 (Two) Months., Disp: 1 mL, Rfl: 6    budesonide-formoterol (Symbicort) 160-4.5 MCG/ACT inhaler, Inhale 2 puffs 2 (Two) Times a Day., Disp: 3 each, Rfl: 3    buprenorphine (SUBUTEX) 8 MG sublingual tablet SL tablet, Dissolve THREE tablets UNDER THE TONGUE EVERY DAY, Disp: , Rfl: 0    clonazePAM (KlonoPIN) 1 MG tablet, Take 1 tablet by mouth 2 (Two) Times a Day., Disp: , Rfl: 0    ethambutol (MYAMBUTOL) 400 MG tablet, Take 4.5 tablets by mouth 3 (Three) Times a Week., Disp: 54 tablet, Rfl: 11    fluticasone (Flonase) 50 MCG/ACT nasal spray, 2 sprays into the nostril(s) as directed by provider Daily., Disp: 1 g, Rfl: 3    furosemide (LASIX) 20 MG tablet, Take 1 tablet by mouth Daily., Disp: , Rfl:     ibuprofen (ADVIL,MOTRIN) 600 MG tablet, Take 1 tablet by mouth Every 8 (Eight) Hours As Needed for Mild Pain., Disp: , Rfl: 1    levothyroxine (SYNTHROID, LEVOTHROID) 100 MCG tablet, Take 1 tablet by mouth Every Morning., Disp: , Rfl:     Linzess 290 MCG capsule capsule, Take 1 capsule by mouth Daily., Disp: , Rfl:     ondansetron ODT (ZOFRAN-ODT) 8 MG disintegrating tablet, Place 1 tablet on the tongue Every 12 (Twelve) Hours As Needed for Nausea or Vomiting., Disp: , Rfl:     rifAMPin (Rifadin) 300 MG capsule, Take 2 capsules by mouth 3 (Three) Times a Week., Disp: 24 capsule, Rfl: 11    Rimegepant Sulfate (Nurtec) 75 MG tablet dispersible tablet, Take 1 tablet by mouth As Needed (Migraine)., Disp: , Rfl:     tiZANidine (ZANAFLEX) 4 MG tablet, Take 2 tablets by mouth Every 8 (Eight) Hours As Needed for Muscle Spasms., Disp: , Rfl: 5    benzonatate (TESSALON) 200 MG capsule, Take 1 capsule by mouth 3 (Three) Times a  Day As Needed. (Patient not taking: Reported on 5/16/2023), Disp: , Rfl:     cetirizine (zyrTEC) 10 MG tablet, Take 1 tablet by mouth Daily. (Patient not taking: Reported on 5/16/2023), Disp: , Rfl: 3    pantoprazole (PROTONIX) 40 MG EC tablet, Take 1 tablet by mouth Daily. (Patient not taking: Reported on 5/16/2023), Disp: , Rfl:     predniSONE (DELTASONE) 50 MG tablet, Take 1 tablet by mouth Daily., Disp: 7 tablet, Rfl: 1    PROAIR RESPICLICK 108 (90 BASE) MCG/ACT inhaler, USE ONE PUFF FOUR TIMES DAILY (Patient not taking: Reported on 5/16/2023), Disp: , Rfl: 5    promethazine-dextromethorphan (PROMETHAZINE-DM) 6.25-15 MG/5ML syrup, 5 mL. (Patient not taking: Reported on 5/16/2023), Disp: , Rfl:     rOPINIRole (REQUIP) 2 MG tablet, TAKE ONE TABLET BY MOUTH AT BEDTIME (Patient not taking: Reported on 5/16/2023), Disp: , Rfl: 5    saccharomyces boulardii (Florastor) 250 MG capsule, Take 1 capsule by mouth 2 (Two) Times a Day., Disp: 30 capsule, Rfl: 11    tiotropium (SPIRIVA) 18 MCG per inhalation capsule, Place 1 capsule into inhaler and inhale Daily for 30 days., Disp: 30 capsule, Rfl: 0    tiotropium bromide monohydrate (Spiriva Respimat) 2.5 MCG/ACT aerosol solution inhaler, Inhale 2 puffs Daily., Disp: 2 each, Rfl: 0    tiotropium bromide monohydrate (Spiriva Respimat) 2.5 MCG/ACT aerosol solution inhaler, Inhale 2 puffs Daily., Disp: 1 each, Rfl: 11     Objective   Physical Exam  Constitutional:       General: She is not in acute distress.     Appearance: Normal appearance. She is normal weight.   HENT:      Right Ear: Hearing normal.      Left Ear: Hearing normal.      Nose: No nasal tenderness or congestion.      Mouth/Throat:      Mouth: Mucous membranes are moist. No oral lesions.   Eyes:      Extraocular Movements: Extraocular movements intact.      Pupils: Pupils are equal, round, and reactive to light.   Neck:      Thyroid: No thyroid mass or thyromegaly.   Cardiovascular:      Rate and Rhythm: Normal  "rate and regular rhythm.      Pulses: Normal pulses.      Heart sounds: Normal heart sounds. No murmur heard.  Pulmonary:      Effort: Pulmonary effort is normal.      Breath sounds: Wheezing present. No rhonchi or rales.   Musculoskeletal:      Cervical back: Neck supple.      Right lower leg: No edema.      Left lower leg: No edema.   Lymphadenopathy:      Cervical: No cervical adenopathy.      Upper Body:      Right upper body: No axillary adenopathy.   Skin:     General: Skin is warm and dry.      Findings: No lesion or rash.   Neurological:      General: No focal deficit present.      Mental Status: She is alert and oriented to person, place, and time.   Psychiatric:         Mood and Affect: Affect normal. Mood is not anxious or depressed.       Vital Signs:   /74 (BP Location: Right arm, Patient Position: Sitting, Cuff Size: Adult)   Pulse 87   Temp 98.1 °F (36.7 °C) (Temporal)   Resp 16   Ht 154.9 cm (61\")   Wt 68.1 kg (150 lb 3.2 oz)   SpO2 95% Comment: room air  BMI 28.38 kg/m²        Result Review :   The following data was reviewed by: OSMAN Mcfadden on 06/14/2023:  CMP          4/30/2023    01:28 5/1/2023    05:40 5/3/2023    04:54   CMP   Glucose 151  126  94    BUN 9  12  10    Creatinine 0.75  0.68  0.83    EGFR 94.7  103.6  83.9    Sodium 138  140  137    Potassium 3.3  4.5  3.6    Chloride 99  104  98    Calcium 9.1  8.9  8.8    BUN/Creatinine Ratio 12.0  17.6  12.0    Anion Gap 12.5  9.7  7.8      CBC w/diff          4/30/2023    01:28 5/1/2023    05:40 5/3/2023    04:54   CBC w/Diff   WBC 6.82  7.88  6.07    RBC 4.46  4.04  4.31    Hemoglobin 12.6  11.4  12.1    Hematocrit 37.7  34.8  36.7    MCV 84.5  86.1  85.2    MCH 28.3  28.2  28.1    MCHC 33.4  32.8  33.0    RDW 12.4  12.7  12.3    Platelets 225  230  247    Neutrophil Rel % 93.8  90.2     Immature Granulocyte Rel % 0.9  0.6     Lymphocyte Rel % 4.1  4.9     Monocyte Rel % 1.2  4.2     Eosinophil Rel % 0.0  0.0   "   Basophil Rel % 0.0  0.1       Data reviewed : Dr. Christian bronchoscopy operative note 5/2/2023 and OSMAN Logan last office note    Procedures        Assessment and Plan    Diagnoses and all orders for this visit:    1. Eosinophilic asthma (Primary)  -     tiotropium bromide monohydrate (Spiriva Respimat) 2.5 MCG/ACT aerosol solution inhaler; Inhale 2 puffs Daily.  Dispense: 2 each; Refill: 0  -     tiotropium bromide monohydrate (Spiriva Respimat) 2.5 MCG/ACT aerosol solution inhaler; Inhale 2 puffs Daily.  Dispense: 1 each; Refill: 11    2. Mycobacterium avium complex  -     saccharomyces boulardii (Florastor) 250 MG capsule; Take 1 capsule by mouth 2 (Two) Times a Day.  Dispense: 30 capsule; Refill: 11  -     predniSONE (DELTASONE) 50 MG tablet; Take 1 tablet by mouth Daily.  Dispense: 7 tablet; Refill: 1    3. Chronic cough    4. Dyspnea on exertion    5. Seasonal allergies    6. Allergic rhinitis, unspecified seasonality, unspecified trigger    7. Chest congestion    8. Wheezing  -     predniSONE (DELTASONE) 50 MG tablet; Take 1 tablet by mouth Daily.  Dispense: 7 tablet; Refill: 1    9. Pulmonary sarcoidosis      10.  Continue using Symbicort and Spiriva as prescribed.  Rinse mouth out after each use.  11.  Continue using albuterol rescue inhaler as needed  12.  Continue azithromycin, ethambutol and rifampin as prescribed.  13.  Continue with Fasenra injections as directed  14.  Continue taking Zyrtec and Flonase for seasonal allergies and allergic rhinitis   15.  Follow-up in 3 to 4 months, sooner if needed.        Follow Up   Return in about 3 months (around 9/14/2023) for Recheck 3-4 months with Rebecca.  Patient was given instructions and counseling regarding her condition or for health maintenance advice. Please see specific information pulled into the AVS if appropriate.

## 2023-09-21 RX ORDER — PANTOPRAZOLE SODIUM 40 MG/1
TABLET, DELAYED RELEASE ORAL
Qty: 30 TABLET | Refills: 3 | Status: SHIPPED | OUTPATIENT
Start: 2023-09-21

## 2023-09-27 ENCOUNTER — OFFICE VISIT (OUTPATIENT)
Dept: PULMONOLOGY | Facility: CLINIC | Age: 53
End: 2023-09-27
Payer: COMMERCIAL

## 2023-09-27 VITALS
SYSTOLIC BLOOD PRESSURE: 140 MMHG | TEMPERATURE: 98.2 F | BODY MASS INDEX: 26.36 KG/M2 | HEIGHT: 61 IN | RESPIRATION RATE: 16 BRPM | WEIGHT: 139.6 LBS | DIASTOLIC BLOOD PRESSURE: 60 MMHG | OXYGEN SATURATION: 95 % | HEART RATE: 76 BPM

## 2023-09-27 DIAGNOSIS — A31.0 MYCOBACTERIUM AVIUM COMPLEX: ICD-10-CM

## 2023-09-27 DIAGNOSIS — Z23 ENCOUNTER FOR IMMUNIZATION: ICD-10-CM

## 2023-09-27 DIAGNOSIS — R06.09 DYSPNEA ON EXERTION: ICD-10-CM

## 2023-09-27 DIAGNOSIS — R06.2 WHEEZING: ICD-10-CM

## 2023-09-27 DIAGNOSIS — J82.83 EOSINOPHILIC ASTHMA: Primary | ICD-10-CM

## 2023-09-27 DIAGNOSIS — R09.89 CHEST CONGESTION: ICD-10-CM

## 2023-09-27 RX ORDER — GUAIFENESIN AND DEXTROMETHORPHAN HYDROBROMIDE 600; 30 MG/1; MG/1
2 TABLET, EXTENDED RELEASE ORAL 2 TIMES DAILY PRN
Qty: 120 EACH | Refills: 0 | Status: SHIPPED | OUTPATIENT
Start: 2023-09-27

## 2023-09-27 RX ORDER — LAMOTRIGINE 200 MG/1
1 TABLET ORAL DAILY
COMMUNITY
Start: 2023-07-17

## 2023-09-27 RX ORDER — LEVOTHYROXINE SODIUM 25 MCG
1 TABLET ORAL DAILY
COMMUNITY
Start: 2023-07-03

## 2023-09-27 RX ORDER — AMITRIPTYLINE HYDROCHLORIDE 100 MG/1
100 TABLET ORAL
COMMUNITY
Start: 2023-08-10

## 2023-09-27 RX ORDER — PREDNISONE 50 MG/1
50 TABLET ORAL DAILY
Qty: 7 TABLET | Refills: 0 | Status: SHIPPED | OUTPATIENT
Start: 2023-09-27

## 2023-09-27 NOTE — PROGRESS NOTES
Primary Care Provider  Everett Tapia MD     Referring Provider  No ref. provider found     Chief Complaint  Asthma, Shortness of Breath, Cough (Unable to cough anything up ), and Follow-up (3-4 month f/up )    Subjective          Lamar Vee presents to [facility] PULMONARY & CRITICAL CARE MEDICINE  History of Present Illness  Lamar Vee is a 52 y.o. female patient of Dr. Field here for management of MAC infection, pulmonary sarcoidosis, asthma, chronic cough, and seasonal allergies/allergic rhinitis.    She states she is doing okay since her last office visit.  She denies any additional antibiotics for her lungs.  She did use a course of steroids not too long ago.  She continues to take Symbicort and Spiriva as prescribed.  She states that she rarely needs to use her albuterol inhaler.  She is getting Fasenra injections as scheduled.  She continues to take azithromycin, ethambutol and rifampin for MAC infection.  Patient states that she does not have a productive cough and does have questions about how she will be retested in the future.  I have informed her that she would likely need a repeat bronchoscopy.  I told patient that I would speak with Dr. Field before her next office visit in 2 months.  Otherwise, she is doing okay and has no additional concerns at this time.  She is able to perform her ADLs without difficulty.  She is up-to-date with her COVID and would like to receive a flu and pneumonia vaccine today.     Her history of smoking is   Tobacco Use: Low Risk     Smoking Tobacco Use: Never    Smokeless Tobacco Use: Never    Passive Exposure: Never   .    Review of Systems   Constitutional:  Negative for chills, fatigue, fever, unexpected weight gain and unexpected weight loss.   HENT:  Negative for congestion (Nasal), hearing loss, mouth sores, nosebleeds, postnasal drip, sore throat and trouble swallowing.    Respiratory:  Positive for cough, shortness of breath and  "wheezing. Negative for apnea.         Negative for Hemoptysis     Cardiovascular:  Negative for chest pain, palpitations and leg swelling.   Gastrointestinal:  Negative for constipation, diarrhea, nausea, vomiting and GERD.   Skin:  Negative for color change.        Negative for cyanosis   Neurological:  Negative for syncope, weakness, numbness and headache.    Sleep: Negative for Excessive daytime sleepiness  Negative for morning headaches  Negative for Snoring    Family History   Problem Relation Age of Onset    Cancer Mother     Cancer Maternal Aunt     Heart disease Maternal Aunt     Malig Hyperthermia Neg Hx         Social History     Socioeconomic History    Marital status:    Tobacco Use    Smoking status: Never     Passive exposure: Never    Smokeless tobacco: Never   Vaping Use    Vaping Use: Never used   Substance and Sexual Activity    Alcohol use: Not Currently    Drug use: Not Currently     Comment: Pt states \"opiate addiction\".    Sexual activity: Defer        Past Medical History:   Diagnosis Date    Anxiety and depression     Asthma     Disease of thyroid gland     H/O: substance abuse     opiates    Hypertension     Melanoma 1992    Migraine     Neuropathy     Sarcoidosis     Seasonal allergies         Immunization History   Administered Date(s) Administered    COVID-19 (MODERNA) 1st,2nd,3rd Dose Monovalent 12/23/2020, 01/20/2021    Flu Vaccine Quad PF >36MO 11/19/2014, 11/12/2015, 09/13/2016, 09/19/2017    Flublok 18+yrs 11/01/2022    Fluzone (or Fluarix & Flulaval for VFC) >6mos 11/19/2014, 11/12/2015, 09/13/2016, 09/19/2017, 09/27/2023    Pneumococcal Conjugate 13-Valent (PCV13) 09/13/2016, 09/19/2017    Pneumococcal Conjugate 20-Valent (PCV20) 09/27/2023    Pneumococcal Polysaccharide (PPSV23) 11/19/2014         Allergies   Allergen Reactions    Morphine Headache    Naloxone     Pentazocine Unknown - Low Severity    Tetanus Toxoids     Toradol [Ketorolac Tromethamine]     Tyloxapol     "       Current Outpatient Medications:     albuterol sulfate  (90 Base) MCG/ACT inhaler, INHALE TWO PUFFS BY MOUTH EVERY 4 HOURS, Disp: 8.5 g, Rfl: 11    amitriptyline (ELAVIL) 100 MG tablet, Take 1 tablet by mouth every night at bedtime., Disp: , Rfl:     azithromycin (ZITHROMAX) 250 MG tablet, Take 2 tablets by mouth 3 (Three) Times a Week., Disp: 30 tablet, Rfl: 11    Benralizumab (Fasenra Pen) 30 MG/ML solution auto-injector, Inject 30 mg under the skin into the appropriate area as directed Every 2 (Two) Months., Disp: 1 mL, Rfl: 6    budesonide-formoterol (Symbicort) 160-4.5 MCG/ACT inhaler, Inhale 2 puffs 2 (Two) Times a Day., Disp: 3 each, Rfl: 3    buprenorphine (SUBUTEX) 8 MG sublingual tablet SL tablet, Dissolve THREE tablets UNDER THE TONGUE EVERY DAY, Disp: , Rfl: 0    clonazePAM (KlonoPIN) 1 MG tablet, Take 1 tablet by mouth 2 (Two) Times a Day., Disp: , Rfl: 0    ethambutol (MYAMBUTOL) 400 MG tablet, Take 4.5 tablets by mouth 3 (Three) Times a Week., Disp: 54 tablet, Rfl: 11    fluticasone (Flonase) 50 MCG/ACT nasal spray, 2 sprays into the nostril(s) as directed by provider Daily., Disp: 1 g, Rfl: 3    furosemide (LASIX) 20 MG tablet, Take 1 tablet by mouth Daily., Disp: , Rfl:     ibuprofen (ADVIL,MOTRIN) 600 MG tablet, Take 1 tablet by mouth Every 8 (Eight) Hours As Needed for Mild Pain., Disp: , Rfl: 1    lamoTRIgine (LaMICtal) 200 MG tablet, Take 1 tablet by mouth Daily., Disp: , Rfl:     levothyroxine (SYNTHROID, LEVOTHROID) 100 MCG tablet, Take 1 tablet by mouth Every Morning., Disp: , Rfl:     Linzess 290 MCG capsule capsule, Take 1 capsule by mouth Daily., Disp: , Rfl:     ondansetron ODT (ZOFRAN-ODT) 8 MG disintegrating tablet, Place 1 tablet on the tongue Every 12 (Twelve) Hours As Needed for Nausea or Vomiting., Disp: , Rfl:     pantoprazole (PROTONIX) 40 MG EC tablet, TAKE ONE TABLET BY MOUTH DAILY, Disp: 30 tablet, Rfl: 3    predniSONE (DELTASONE) 50 MG tablet, Take 1 tablet by  mouth Daily., Disp: 7 tablet, Rfl: 0    rifAMPin (Rifadin) 300 MG capsule, Take 2 capsules by mouth 3 (Three) Times a Week., Disp: 24 capsule, Rfl: 11    Rimegepant Sulfate (Nurtec) 75 MG tablet dispersible tablet, Take 1 tablet by mouth As Needed (Migraine)., Disp: , Rfl:     saccharomyces boulardii (Florastor) 250 MG capsule, Take 1 capsule by mouth 2 (Two) Times a Day., Disp: 30 capsule, Rfl: 11    Synthroid 25 MCG tablet, Take 1 tablet by mouth Daily., Disp: , Rfl:     tiotropium bromide monohydrate (Spiriva Respimat) 2.5 MCG/ACT aerosol solution inhaler, Inhale 2 puffs Daily., Disp: 2 each, Rfl: 0    tiotropium bromide monohydrate (Spiriva Respimat) 2.5 MCG/ACT aerosol solution inhaler, Inhale 2 puffs Daily., Disp: 1 each, Rfl: 11    tiZANidine (ZANAFLEX) 4 MG tablet, Take 2 tablets by mouth Every 8 (Eight) Hours As Needed for Muscle Spasms., Disp: , Rfl: 5    benzonatate (TESSALON) 200 MG capsule, Take 1 capsule by mouth 3 (Three) Times a Day As Needed. (Patient not taking: Reported on 5/16/2023), Disp: , Rfl:     cetirizine (zyrTEC) 10 MG tablet, Take 1 tablet by mouth Daily. (Patient not taking: Reported on 5/16/2023), Disp: , Rfl: 3    guaifenesin-dextromethorphan (MUCINEX DM)  MG tablet sustained-release 12 hour tablet, Take 2 tablets by mouth 2 (Two) Times a Day As Needed (congestion)., Disp: 120 each, Rfl: 0    PROAIR RESPICLICK 108 (90 BASE) MCG/ACT inhaler, USE ONE PUFF FOUR TIMES DAILY (Patient not taking: Reported on 5/16/2023), Disp: , Rfl: 5    promethazine-dextromethorphan (PROMETHAZINE-DM) 6.25-15 MG/5ML syrup, 5 mL. (Patient not taking: Reported on 5/16/2023), Disp: , Rfl:     rOPINIRole (REQUIP) 2 MG tablet, TAKE ONE TABLET BY MOUTH AT BEDTIME (Patient not taking: Reported on 5/16/2023), Disp: , Rfl: 5    tiotropium (SPIRIVA) 18 MCG per inhalation capsule, Place 1 capsule into inhaler and inhale Daily for 30 days., Disp: 30 capsule, Rfl: 0     Objective   Physical Exam  Constitutional:   "     General: She is not in acute distress.     Appearance: Normal appearance. She is normal weight.   HENT:      Right Ear: Hearing normal.      Left Ear: Hearing normal.      Nose: No nasal tenderness or congestion.      Mouth/Throat:      Mouth: Mucous membranes are moist. No oral lesions.   Eyes:      Extraocular Movements: Extraocular movements intact.      Pupils: Pupils are equal, round, and reactive to light.   Neck:      Thyroid: No thyroid mass or thyromegaly.   Cardiovascular:      Rate and Rhythm: Normal rate and regular rhythm.      Pulses: Normal pulses.      Heart sounds: Normal heart sounds. No murmur heard.  Pulmonary:      Effort: Pulmonary effort is normal.      Breath sounds: Wheezing present. No rhonchi or rales.   Musculoskeletal:      Cervical back: Neck supple.      Right lower leg: No edema.      Left lower leg: No edema.   Lymphadenopathy:      Cervical: No cervical adenopathy.      Upper Body:      Right upper body: No axillary adenopathy.   Skin:     General: Skin is warm and dry.      Findings: No lesion or rash.   Neurological:      General: No focal deficit present.      Mental Status: She is alert and oriented to person, place, and time.   Psychiatric:         Mood and Affect: Affect normal. Mood is not anxious or depressed.       Vital Signs:   /60 (BP Location: Right arm, Patient Position: Sitting, Cuff Size: Adult)   Pulse 76   Temp 98.2 °F (36.8 °C) (Temporal)   Resp 16   Ht 154.9 cm (61\")   Wt 63.3 kg (139 lb 9.6 oz)   SpO2 95% Comment: room air  BMI 26.38 kg/m²        Result Review :   The following data was reviewed by: OSMAN Mcfadden on 09/27/2023:  CMP          4/30/2023    01:28 5/1/2023    05:40 5/3/2023    04:54   CMP   Glucose 151  126  94    BUN 9  12  10    Creatinine 0.75  0.68  0.83    EGFR 94.7  103.6  83.9    Sodium 138  140  137    Potassium 3.3  4.5  3.6    Chloride 99  104  98    Calcium 9.1  8.9  8.8    BUN/Creatinine Ratio 12.0  17.6  12.0  "   Anion Gap 12.5  9.7  7.8      CBC w/diff          4/30/2023    01:28 5/1/2023    05:40 5/3/2023    04:54   CBC w/Diff   WBC 6.82  7.88  6.07    RBC 4.46  4.04  4.31    Hemoglobin 12.6  11.4  12.1    Hematocrit 37.7  34.8  36.7    MCV 84.5  86.1  85.2    MCH 28.3  28.2  28.1    MCHC 33.4  32.8  33.0    RDW 12.4  12.7  12.3    Platelets 225  230  247    Neutrophil Rel % 93.8  90.2     Immature Granulocyte Rel % 0.9  0.6     Lymphocyte Rel % 4.1  4.9     Monocyte Rel % 1.2  4.2     Eosinophil Rel % 0.0  0.0     Basophil Rel % 0.0  0.1       Data reviewed : Radiologic studies chest x-ray 8/17/2021, pulmonary function test 1/25/2019 and my last office note    Procedures        Assessment and Plan    Diagnoses and all orders for this visit:    1. Eosinophilic asthma (Primary)    2. Mycobacterium avium complex  -     predniSONE (DELTASONE) 50 MG tablet; Take 1 tablet by mouth Daily.  Dispense: 7 tablet; Refill: 0    3. Encounter for immunization  -     Pneumococcal Conjugate Vaccine 20-Valent (PCV20)  -     Fluzone >6 Months (1815-2961)    4. Wheezing  -     predniSONE (DELTASONE) 50 MG tablet; Take 1 tablet by mouth Daily.  Dispense: 7 tablet; Refill: 0  -     guaifenesin-dextromethorphan (MUCINEX DM)  MG tablet sustained-release 12 hour tablet; Take 2 tablets by mouth 2 (Two) Times a Day As Needed (congestion).  Dispense: 120 each; Refill: 0    5. Chest congestion  -     guaifenesin-dextromethorphan (MUCINEX DM)  MG tablet sustained-release 12 hour tablet; Take 2 tablets by mouth 2 (Two) Times a Day As Needed (congestion).  Dispense: 120 each; Refill: 0    6. Dyspnea on exertion    7.  Continue Symbicort and Spiriva as prescribed.  Rinse mouth out after each use.  8.  Continue albuterol as needed.  9.  Continue Fasenra as scheduled.  10.  Continue azithromycin, ethambutol and rifampin as scheduled.  11.  Follow-up in 2 months, sooner if needed.        Follow Up   Return in about 2 months (around 11/27/2023)  for Recheck with Rebecca.  Patient was given instructions and counseling regarding her condition or for health maintenance advice. Please see specific information pulled into the AVS if appropriate.        Statement Selected

## 2023-10-18 ENCOUNTER — OFFICE VISIT (OUTPATIENT)
Dept: PULMONOLOGY | Facility: CLINIC | Age: 53
End: 2023-10-18
Payer: COMMERCIAL

## 2023-10-18 VITALS
TEMPERATURE: 97.1 F | DIASTOLIC BLOOD PRESSURE: 72 MMHG | BODY MASS INDEX: 26.06 KG/M2 | HEART RATE: 86 BPM | WEIGHT: 138 LBS | OXYGEN SATURATION: 98 % | SYSTOLIC BLOOD PRESSURE: 130 MMHG | RESPIRATION RATE: 18 BRPM | HEIGHT: 61 IN

## 2023-10-18 DIAGNOSIS — D86.0 PULMONARY SARCOIDOSIS: ICD-10-CM

## 2023-10-18 DIAGNOSIS — J82.83 EOSINOPHILIC ASTHMA: ICD-10-CM

## 2023-10-18 DIAGNOSIS — A31.0 MAI (MYCOBACTERIUM AVIUM-INTRACELLULARE): Primary | ICD-10-CM

## 2023-10-18 DIAGNOSIS — A31.0 MYCOBACTERIUM AVIUM COMPLEX: ICD-10-CM

## 2023-10-18 DIAGNOSIS — J18.9 PNEUMONIA OF BOTH LUNGS DUE TO INFECTIOUS ORGANISM, UNSPECIFIED PART OF LUNG: ICD-10-CM

## 2023-10-18 RX ORDER — LEVOFLOXACIN 750 MG/1
750 TABLET ORAL DAILY
Qty: 5 TABLET | Refills: 0 | Status: SHIPPED | OUTPATIENT
Start: 2023-10-18

## 2023-10-20 NOTE — PROGRESS NOTES
"Chief Complaint  Pulmonary Infiltrate, Eosinophilic asthma, Pulmonary Sarcoidosis, Follow-up (Breathing problems, Started Monday night ), Shortness of Breath, and Cough (Phlegm- Green)    Subjective        Lamar Vee presents to [facility] PULMONARY & CRITICAL CARE MEDICINE  History of Present Illness  Follow-up for eosinophilic asthma and pneumonia  Increased cough  Increased shortness of breath  Cough productive of thick sputum  No fevers no chills  No hemoptysis  Objective   Vital Signs:  /72 (BP Location: Right arm, Patient Position: Sitting, Cuff Size: Adult)   Pulse 86   Temp 97.1 °F (36.2 °C) (Temporal)   Resp 18   Ht 154.9 cm (61\")   Wt 62.6 kg (138 lb)   SpO2 98% Comment: room air  BMI 26.07 kg/m²   Estimated body mass index is 26.07 kg/m² as calculated from the following:    Height as of this encounter: 154.9 cm (61\").    Weight as of this encounter: 62.6 kg (138 lb).             Physical Exam   Vital Signs Reviewed  General WDWN, Alert, NAD.    Chest:  good aeration, clear to auscultation bilaterally, tympanic to percussion bilaterally, no work of breathing noted  CV: RRR, no MGR, .  EXT:  no clubbing, no cyanosis, no edema, no joint tenderness  Neuro:  A&Ox3, CN grossly intact, no focal deficits.  Skin: No rashes or lesions noted  Result Review :                   Assessment and Plan   Diagnoses and all orders for this visit:    1. NUNU (mycobacterium avium-intracellulare) (Primary)  -     AFB Culture - Sputum, Bronchus; Future    2. Pneumonia of both lungs due to infectious organism, unspecified part of lung  Assessment & Plan:  Will treat with a course of Levaquin   Hold Zithromax  for 2 weeks while on levaquin      3. Pulmonary sarcoidosis  Assessment & Plan:  Continue corticosteroids      4. Eosinophilic asthma  Assessment & Plan:  Continue LABA  Continue LAMA  Continue inhaled corticosteroid  Continue Fasenra          5. Mycobacterium avium complex  Assessment & " Plan:  Repeat continue thrice weekly Zithromax ethambutol and rifampin AFB culture        Other orders  -     levoFLOXacin (Levaquin) 750 MG tablet; Take 1 tablet by mouth Daily.  Dispense: 5 tablet; Refill: 0             Follow Up   Return in about 1 month (around 11/18/2023).  Patient was given instructions and counseling regarding her condition or for health maintenance advice. Please see specific information pulled into the AVS if appropriate.

## 2023-11-11 DIAGNOSIS — A31.0 MYCOBACTERIUM AVIUM COMPLEX: ICD-10-CM

## 2023-11-11 DIAGNOSIS — R06.2 WHEEZING: ICD-10-CM

## 2023-11-13 RX ORDER — PREDNISONE 50 MG/1
50 TABLET ORAL DAILY
Qty: 7 TABLET | Refills: 0 | OUTPATIENT
Start: 2023-11-13

## 2023-11-16 NOTE — PROGRESS NOTES
Primary Care Provider  Everett Tapia MD     Referring Provider  No ref. provider found     Chief Complaint  Follow-up (2 month f/up - patient complains of nasal/head congestion ), Asthma, Sarcoidosis, and Cough    Subjective          Lamar Vee presents to [facility] PULMONARY & CRITICAL CARE MEDICINE  History of Present Illness  Lamar Vee is a 52 y.o. female patient of Dr. Field here for management of MAC infection, pulmonary sarcoidosis, asthma, chronic cough, and seasonal allergies/allergic rhinitis.     Patient states she is doing okay since her last office visit.  She did complete the course of Levaquin that was prescribed by Dr. Field.  She does complain of more sinus congestion over the past couple days related to changes in weather.  She continues to take Symbicort and Spiriva as prescribed.  She intermittently use her albuterol if needed.  She is getting Fasenra injections as scheduled.  States that she is unsure if she will be able to have Fasenra covered next year, but I have encouraged patient to try to reenroll in the assistance program through the company.  I have also given patient our nurse navigators contact information for any future further questions.  She continues to take azithromycin, ethambutol and rifampin for MAC infection.  Patient states that she has a nonproductive cough and is wondering how we will test for clearance of the MAC infection.  Patient can have a bronchoscopy after 1 year of treatment.  I have also discussed this with Dr. Field.  Otherwise, patient has no additional concerns at this time.  She is able to perform her ADLs without difficulty.  She is up-to-date with her COVID, flu and pneumonia vaccines.     Her history of smoking is   Tobacco Use: Low Risk  (11/17/2023)    Patient History     Smoking Tobacco Use: Never     Smokeless Tobacco Use: Never     Passive Exposure: Never   .    Review of Systems   Constitutional:  Negative for  "chills, fatigue, fever, unexpected weight gain and unexpected weight loss.   HENT:  Positive for congestion (Nasal).    Respiratory:  Positive for cough and shortness of breath. Negative for apnea and wheezing.         Negative for Hemoptysis     Cardiovascular:  Negative for chest pain, palpitations and leg swelling.   Skin:         Negative for cyanosis      Sleep: Negative for Excessive daytime sleepiness  Negative for morning headaches  Negative for Snoring    Family History   Problem Relation Age of Onset    Cancer Mother     Cancer Maternal Aunt     Heart disease Maternal Aunt     Malig Hyperthermia Neg Hx         Social History     Socioeconomic History    Marital status:    Tobacco Use    Smoking status: Never     Passive exposure: Never    Smokeless tobacco: Never   Vaping Use    Vaping Use: Never used   Substance and Sexual Activity    Alcohol use: Not Currently    Drug use: Not Currently     Comment: Pt states \"opiate addiction\".    Sexual activity: Defer        Past Medical History:   Diagnosis Date    Anxiety and depression     Asthma     Disease of thyroid gland     H/O: substance abuse     opiates    Hypertension     Melanoma 1992    Migraine     Neuropathy     Sarcoidosis     Seasonal allergies         Immunization History   Administered Date(s) Administered    COVID-19 (MODERNA) 1st,2nd,3rd Dose Monovalent 12/23/2020, 01/20/2021    Flu Vaccine Quad PF >36MO 11/19/2014, 11/12/2015, 09/13/2016, 09/19/2017    Flublok 18+yrs 11/01/2022    Fluzone (or Fluarix & Flulaval for VFC) >6mos 11/19/2014, 11/12/2015, 09/13/2016, 09/19/2017, 09/27/2023    Pneumococcal Conjugate 13-Valent (PCV13) 09/13/2016, 09/19/2017    Pneumococcal Conjugate 20-Valent (PCV20) 09/27/2023    Pneumococcal Polysaccharide (PPSV23) 11/19/2014         Allergies   Allergen Reactions    Morphine Headache    Naloxone     Pentazocine Unknown - Low Severity    Tetanus Toxoids     Toradol [Ketorolac Tromethamine]     Tyloxapol     "       Current Outpatient Medications:     albuterol sulfate  (90 Base) MCG/ACT inhaler, INHALE TWO PUFFS BY MOUTH EVERY 4 HOURS, Disp: 8.5 g, Rfl: 11    amitriptyline (ELAVIL) 100 MG tablet, Take 1 tablet by mouth every night at bedtime., Disp: , Rfl:     azithromycin (ZITHROMAX) 250 MG tablet, Take 2 tablets by mouth 3 (Three) Times a Week., Disp: 30 tablet, Rfl: 11    Benralizumab (Fasenra Pen) 30 MG/ML solution auto-injector, Inject 30 mg under the skin into the appropriate area as directed Every 2 (Two) Months., Disp: 1 mL, Rfl: 6    budesonide-formoterol (Symbicort) 160-4.5 MCG/ACT inhaler, Inhale 2 puffs 2 (Two) Times a Day., Disp: 3 each, Rfl: 3    buprenorphine (SUBUTEX) 8 MG sublingual tablet SL tablet, Dissolve THREE tablets UNDER THE TONGUE EVERY DAY, Disp: , Rfl: 0    clonazePAM (KlonoPIN) 1 MG tablet, Take 1 tablet by mouth 2 (Two) Times a Day., Disp: , Rfl: 0    ethambutol (MYAMBUTOL) 400 MG tablet, Take 4.5 tablets by mouth 3 (Three) Times a Week., Disp: 54 tablet, Rfl: 11    fluticasone (Flonase) 50 MCG/ACT nasal spray, 2 sprays into the nostril(s) as directed by provider Daily., Disp: 1 g, Rfl: 3    furosemide (LASIX) 20 MG tablet, Take 1 tablet by mouth Daily., Disp: , Rfl:     ibuprofen (ADVIL,MOTRIN) 600 MG tablet, Take 1 tablet by mouth Every 8 (Eight) Hours As Needed for Mild Pain., Disp: , Rfl: 1    lamoTRIgine (LaMICtal) 200 MG tablet, Take 1 tablet by mouth Daily., Disp: , Rfl:     levothyroxine (SYNTHROID, LEVOTHROID) 100 MCG tablet, Take 1 tablet by mouth Every Morning., Disp: , Rfl:     Linzess 290 MCG capsule capsule, Take 1 capsule by mouth Daily., Disp: , Rfl:     ondansetron ODT (ZOFRAN-ODT) 8 MG disintegrating tablet, Place 1 tablet on the tongue Every 12 (Twelve) Hours As Needed for Nausea or Vomiting., Disp: , Rfl:     pantoprazole (PROTONIX) 40 MG EC tablet, TAKE ONE TABLET BY MOUTH DAILY, Disp: 30 tablet, Rfl: 3    rifAMPin (Rifadin) 300 MG capsule, Take 2 capsules by  mouth 3 (Three) Times a Week., Disp: 24 capsule, Rfl: 11    Rimegepant Sulfate (Nurtec) 75 MG tablet dispersible tablet, Take 1 tablet by mouth As Needed (Migraine)., Disp: , Rfl:     saccharomyces boulardii (Florastor) 250 MG capsule, Take 1 capsule by mouth 2 (Two) Times a Day., Disp: 30 capsule, Rfl: 11    Synthroid 25 MCG tablet, Take 1 tablet by mouth Daily., Disp: , Rfl:     tiotropium bromide monohydrate (Spiriva Respimat) 2.5 MCG/ACT aerosol solution inhaler, Inhale 2 puffs Daily., Disp: 2 each, Rfl: 0    tiotropium bromide monohydrate (Spiriva Respimat) 2.5 MCG/ACT aerosol solution inhaler, Inhale 2 puffs Daily., Disp: 1 each, Rfl: 11    tiZANidine (ZANAFLEX) 4 MG tablet, Take 2 tablets by mouth Every 8 (Eight) Hours As Needed for Muscle Spasms., Disp: , Rfl: 5    benzonatate (TESSALON) 200 MG capsule, Take 1 capsule by mouth 3 (Three) Times a Day As Needed. (Patient not taking: Reported on 5/16/2023), Disp: , Rfl:     cetirizine (zyrTEC) 10 MG tablet, Take 1 tablet by mouth Daily. (Patient not taking: Reported on 5/16/2023), Disp: , Rfl: 3    guaifenesin-dextromethorphan (MUCINEX DM)  MG tablet sustained-release 12 hour tablet, Take 2 tablets by mouth 2 (Two) Times a Day As Needed (congestion). (Patient not taking: Reported on 11/17/2023), Disp: 120 each, Rfl: 0    predniSONE (DELTASONE) 50 MG tablet, Take 1 tablet by mouth Daily., Disp: 5 tablet, Rfl: 1    PROAIR RESPICLICK 108 (90 BASE) MCG/ACT inhaler, USE ONE PUFF FOUR TIMES DAILY (Patient not taking: Reported on 5/16/2023), Disp: , Rfl: 5    promethazine-dextromethorphan (PROMETHAZINE-DM) 6.25-15 MG/5ML syrup, 5 mL. (Patient not taking: Reported on 11/17/2023), Disp: , Rfl:     rOPINIRole (REQUIP) 2 MG tablet, TAKE ONE TABLET BY MOUTH AT BEDTIME (Patient not taking: Reported on 5/16/2023), Disp: , Rfl: 5    tiotropium (SPIRIVA) 18 MCG per inhalation capsule, Place 1 capsule into inhaler and inhale Daily for 30 days., Disp: 30 capsule, Rfl: 0  "    Objective   Physical Exam  Constitutional:       General: She is not in acute distress.     Appearance: Normal appearance. She is normal weight.   HENT:      Right Ear: Hearing normal.      Left Ear: Hearing normal.      Nose: No nasal tenderness or congestion.      Mouth/Throat:      Mouth: Mucous membranes are moist. No oral lesions.   Eyes:      Extraocular Movements: Extraocular movements intact.      Pupils: Pupils are equal, round, and reactive to light.   Neck:      Thyroid: No thyroid mass or thyromegaly.   Cardiovascular:      Rate and Rhythm: Normal rate and regular rhythm.      Pulses: Normal pulses.      Heart sounds: Normal heart sounds. No murmur heard.  Pulmonary:      Effort: Pulmonary effort is normal.      Breath sounds: Decreased breath sounds present. No wheezing, rhonchi or rales.   Musculoskeletal:      Cervical back: Neck supple.      Right lower leg: No edema.      Left lower leg: No edema.   Lymphadenopathy:      Cervical: No cervical adenopathy.      Upper Body:      Right upper body: No axillary adenopathy.   Skin:     General: Skin is warm and dry.      Findings: No lesion or rash.   Neurological:      General: No focal deficit present.      Mental Status: She is alert and oriented to person, place, and time.   Psychiatric:         Mood and Affect: Affect normal. Mood is not anxious or depressed.         Vital Signs:   /80 (BP Location: Left arm, Patient Position: Sitting, Cuff Size: Adult)   Pulse 73   Temp 97.8 °F (36.6 °C) (Temporal)   Resp 16   Ht 154.9 cm (61\")   Wt 62.6 kg (138 lb)   SpO2 94% Comment: RA  BMI 26.07 kg/m²        Result Review :   The following data was reviewed by: OSMAN Mcfadden on 11/17/2023:  CMP          4/30/2023    01:28 5/1/2023    05:40 5/3/2023    04:54   CMP   Glucose 151  126  94    BUN 9  12  10    Creatinine 0.75  0.68  0.83    EGFR 94.7  103.6  83.9    Sodium 138  140  137    Potassium 3.3  4.5  3.6    Chloride 99  104  98  "   Calcium 9.1  8.9  8.8    BUN/Creatinine Ratio 12.0  17.6  12.0    Anion Gap 12.5  9.7  7.8      CBC w/diff          4/30/2023    01:28 5/1/2023    05:40 5/3/2023    04:54   CBC w/Diff   WBC 6.82  7.88  6.07    RBC 4.46  4.04  4.31    Hemoglobin 12.6  11.4  12.1    Hematocrit 37.7  34.8  36.7    MCV 84.5  86.1  85.2    MCH 28.3  28.2  28.1    MCHC 33.4  32.8  33.0    RDW 12.4  12.7  12.3    Platelets 225  230  247    Neutrophil Rel % 93.8  90.2     Immature Granulocyte Rel % 0.9  0.6     Lymphocyte Rel % 4.1  4.9     Monocyte Rel % 1.2  4.2     Eosinophil Rel % 0.0  0.0     Basophil Rel % 0.0  0.1     Personally reviewed AFB culture showing Mycobacterium avium complex on 5/2/2023    Data reviewed : Radiologic studies chest x-ray 8/17/2021, pulmonary function test 1/25/2019 and Dr. Field's last office note    Procedures        Assessment and Plan    Diagnoses and all orders for this visit:    1. Eosinophilic asthma (Primary)  -     predniSONE (DELTASONE) 50 MG tablet; Take 1 tablet by mouth Daily.  Dispense: 5 tablet; Refill: 1    2. Pulmonary sarcoidosis    3. Mycobacterium avium complex    4. Dyspnea on exertion    5. Seasonal allergies    6. Allergic rhinitis, unspecified seasonality, unspecified trigger    7. Wheezing  -     predniSONE (DELTASONE) 50 MG tablet; Take 1 tablet by mouth Daily.  Dispense: 5 tablet; Refill: 1    8.  Continue Symbicort and Spiriva as prescribed.  Rinse mouth out after each use.  9.  Continue albuterol as needed.  10.  Continue Fasenra as scheduled.  11.  Continue azithromycin, ethambutol and rifampin as scheduled.  12.  Follow-up in 3 months, sooner if needed.        Follow Up   Return in about 3 months (around 2/17/2024) for Recheck with Tj.  Patient was given instructions and counseling regarding her condition or for health maintenance advice. Please see specific information pulled into the AVS if appropriate.

## 2023-11-17 ENCOUNTER — OFFICE VISIT (OUTPATIENT)
Dept: PULMONOLOGY | Facility: CLINIC | Age: 53
End: 2023-11-17
Payer: COMMERCIAL

## 2023-11-17 VITALS
RESPIRATION RATE: 16 BRPM | HEIGHT: 61 IN | TEMPERATURE: 97.8 F | DIASTOLIC BLOOD PRESSURE: 80 MMHG | BODY MASS INDEX: 26.06 KG/M2 | HEART RATE: 73 BPM | SYSTOLIC BLOOD PRESSURE: 119 MMHG | WEIGHT: 138 LBS | OXYGEN SATURATION: 94 %

## 2023-11-17 DIAGNOSIS — D86.0 PULMONARY SARCOIDOSIS: ICD-10-CM

## 2023-11-17 DIAGNOSIS — J30.9 ALLERGIC RHINITIS, UNSPECIFIED SEASONALITY, UNSPECIFIED TRIGGER: ICD-10-CM

## 2023-11-17 DIAGNOSIS — R06.09 DYSPNEA ON EXERTION: ICD-10-CM

## 2023-11-17 DIAGNOSIS — R06.2 WHEEZING: ICD-10-CM

## 2023-11-17 DIAGNOSIS — J82.83 EOSINOPHILIC ASTHMA: Primary | ICD-10-CM

## 2023-11-17 DIAGNOSIS — J30.2 SEASONAL ALLERGIES: ICD-10-CM

## 2023-11-17 DIAGNOSIS — A31.0 MYCOBACTERIUM AVIUM COMPLEX: ICD-10-CM

## 2023-11-17 RX ORDER — PREDNISONE 50 MG/1
50 TABLET ORAL DAILY
Qty: 5 TABLET | Refills: 1 | Status: SHIPPED | OUTPATIENT
Start: 2023-11-17

## 2023-12-31 NOTE — PROGRESS NOTES
Patient: YAMILA FREEMAN     Acct: [id number]     Report: #[id number]  UNIT #: [id number]     : 1970    Encounter Date:2019  PRIMARY CARE: EVERETT MARROQUIN  ***Signed***  --------------------------------------------------------------------------------------------------------------------  Chief Complaint      Encounter Date      Mar 4, 2019            Primary Care Provider      Everett Marroquin            Referring Provider      SELF,REFERRED            Patient Complaint      Patient is complaining of      1 Month F/U Per [facility]/Methacholine Results            VITALS      Height 5 ft 1 in / 154.94 cm      Weight 164 lbs 8 oz / 74.99808 kg      BSA 1.74 m2      BMI 31.1 kg/m2      Temperature 97.6 F / 36.44 C - Oral      Pulse 82      Respirations 14      Blood Pressure 109/62 Sitting, Right Arm      Pulse Oximetry 99%, roomair            HPI      The patient is a very pleasant 48 year old white female patient of Dr. Field's    who he last saw about 1.5 months ago. She has a history of biopsy proven     sarcoidosis.             Since her last office visit she had methacholine challenge test that I reviewed     with her that was consistent with asthma. She had a mildly decreased IgG level     and recurrent lung infections as well as recurrent bronchitis. It is unclear if     some of her symptoms such as dyspnea, coughing or wheezing is from her     sarcoidosis or her asthma. At her last office visit Dr. Field started on 7.5     mg of methotrexate weekly and folic acid. The plan was for her follow up with me    today, have blood work including CBC and comprehensive metabolic profile and     then follow up with him in 1 month. She had some of her blood work done that I     have reviewed with her today including CBC showing peripheral eosinophilia with     eosinophil percentage of 9. She had a normal IgE level at 19. Her ANCA was     negative. She also had hepatitis B testing which was negative. The  patient     states that that her breathing is doing about the same since her last office     visit. She denies worsening dyspnea, coughing or wheezing and denies worsening     dyspnea, coughing or wheezing and denies any significant improvement in her     symptoms since starting on the methotrexate. She denies hemoptysis, fevers or     chills. She felt like she had a sore throat last night and some wheezing today.     She says she has taken Levaquin in between in her last visit in mid January and     today but is not sure exactly when she took it. She is concerned that she will     need another antibiotic before she sees us next.             I have reviewed his Review of Systems medical, surgical and family history and     agree with those as entered.      Copies To:   Victor M Feild ;            DOREEN      Constitutional:  Denies: Fatigue, Fever, Weight gain, Weight loss, Chills,     Insomnia, Other      Respiratory/Breathing:  Complains of: Cough; Denies: Shortness of air, Wheezing,    Hemoptysis, Pleuritic pain, Other      Endocrine:  Denies: Polydipsia, Polyuria, Heat/cold intolerance, Abnorml     menstrual pattern, Diabetes, Other      Eyes:  Denies: Blurred vision, Vision Changes, Other      Ears, nose, mouth, throat:  Denies: Mouth lesions, Thrush, Throat pain,     Hoarseness, Allergies/Hay Fever, Post Nasal Drip, Headaches, Recent Head Injury,    Nose Bleeding, Neck Stiffness, Thyroid Mass, Hearing Loss, Ear Fullness, Dry     Mouth, Nasal or Sinus Pain, Dry Lips, Nasal discharge, Nasal congestion, Other      Cardiovascular:  Denies: Palpitations, Syncope, Claudication, Chest Pain, Wake     up Gasping for air, Leg Swelling, Irregular Heart Rate, Cyanosis, Dyspnea on     Exertion, Other      Gastrointestinal:  Denies: Nausea, Constipation, Diarrhea, Abdominal pain,     Vomiting, Difficulty Swallowing, Reflux/Heartburn, Dysphagia, Jaundice,     Bloating, Melena, Bloody stools, Other      Genitourinary:  Denies:  Urinary frequency, Incontinence, Hematuria, Urgency,     Nocturia, Dysuria, Testicular problems, Other      Musculoskeletal:  Denies: Joint Pain, Joint Stiffness, Joint Swelling, Myalgias,    Other      Hematologic/lymphatic:  DENIES: Lymphadenopathy, Bruising, Bleeding tendencies,     Other      Neurological:  Denies: Headache, Numbness, Weakness, Seizures, Other      Psychiatric:  Denies: Anxiety, Appropriate Effect, Depression, Other      Sleep:  No: Excessive daytime sleep, Morning Headache?, Snoring, Insomnia?, Stop    breathing at sleep?, Other      Integumentary:  Denies: Rash, Dry skin, Skin Warm to Touch, Other      Immunologic/Allergic:  Denies: Latex allergy, Seasonal allergies, Asthma, Ur    ticaria, Eczema, Other      Immunization status:  No: Up to date            FAMILY/SOCIAL/MEDICAL HX      Surgical History:  Yes: Appendectomy (2000), Bowel Surgery (COLONOSCOPY),     Cholecystectomy (2004), Oral Surgery (TONSILECTOMY), Orthopedic Surgery (BACK L4    AND L5, OTHER BACK SX, ROTATOR CUFF TO RT SHAVED), Throat Surgery (TONSILECTOMY     2004)      Diabetes - Family Hx:  Grandparent      Cancer/Type - Family Hx:  Mother      Other Family Medical History:  Grandparent      Is Father Still Living?:  Yes      Is Mother Still Living?:  Yes      Social History:  No Tobacco Use, No Alcohol Use, No Recreational Drug use      Smoking status:  Never smoker      Occupation:  disabled      Hysterectomy:  Yes      Anticoagulation Therapy:  No      Antibiotic Prophylaxis:  No      Medical History:  Yes: Chemotherapy/Cancer (MELANOMA RT LEG REMOVED), Congestive    Heart Failu, Depression (found out mother has cancer), Anxiety, Seizures     (possible in the past), Hemorrhoids/Rectal Prob (ABNORAML CT SCAN), High Blood     Pressure (AT TIMES), Reflux Disease, Shortness Of Breath (SARCOIDOSIS), Stroke     (unknown if this is definite or if something else ); No: Arthritis, Blood     Disease, Deafness or Ringing Ears,  Diabetes, Sinus Trouble, Miscellaneous     Medical/oth            PREVENTION      Date Influenza Vaccine Given:  Oct 1, 2018      Influenza Vaccine Declined:  No      2 or More Falls Past Year?:  No      Fall Past Year with Injury?:  No      Encouraged to follow-up with:  PCP regarding preventative exams.      Chart initiated by      Nicolette Victor ma            ALLERGIES/MEDICATIONS      Allergies:        Coded Allergies:             TETANUS TOXOID, ADSORBED (Verified  Allergy, Unknown, HIGH FEVER, TALKING     OUT OF HEAD, 3/4/19)           BUPRENORPHINE (Verified  Adverse Reaction, Unknown, PT TAKES BUPRENORPHINE     REGULARLY, 3/4/19)                  TAKES THE FORM WITHOUT THE MELOXONE           MORPHINE (Verified  Adverse Reaction, Unknown, MIGRAINE, RASH, 3/4/19)           NALOXONE (Verified  Adverse Reaction, Unknown, RASH, HEADACHE, 3/4/19)           PENTAZOCINE (Verified  Adverse Reaction, Unknown, UNK, 3/4/19)      Medications    Last Reconciled on 3/4/19 15:15 by RANDI FRANCISCO      Azithromycin Z-Arvin (Zithromax Z-Arvin) 250 Mg Tablet      250 MG PO ASDIR, #1 PKT         Prov: Linda Sandhu PA-C         3/4/19       Ondansetron Odt (ONDANSETRON ODT) 4 Mg Tab.rapdis      4 MG PO Q8H PRN for NAUSEA, #25 TAB.RAPDIS 2 Refills         Prov: Linda Sandhu PA-C         3/4/19       Folic Acid (Folic Acid*) 1 Mg Tablet      1 MG PO QDAY, #30 TAB 11 Refills         Prov: Victor M Field         1/15/19       Methotrexate (Methotrexate) 2.5 Mg Tablet      7.5 MG PO QSATURDAY, #30 TAB 2 Refills         Prov: Victor M Field         1/15/19       Methotrexate (Methotrexate) 2.5 Mg Tablet      0 PO QSATURDAY, TAB         Reported         12/4/18       Fluticasone/Umeclidin/Vilanter (Trelegy Ellipta 100-62.5-25) 1 Each Blst.w.dev      1 PUFF INH RTQDAY, #1 INH 5 Refills         Prov: Linda Sandhu PA-C         12/4/18       Folic Acid (Folic Acid*) 1 Mg Tablet      1 MG PO QDAY, #30 TAB 0 Refills          Reported         12/4/18       Albuterol (Proair HFA) 8.5 Gm Inh      1 PUFFS INH RTQ4H for 30 Days, #1 INH 3 Refills         Prov: Victor M Field         6/27/18       Cetirizine/Pseudoephedrine (ZyrTEC-D) 1 Each Tab.er.12h      1 TAB PO BID, #60 TAB 6 Refills         Prov: Victor M Field         6/26/18       Demetrius-Fluticasone (Fluticasone 50 mcg) 16 Gm Spray.susp      2 PUFFS NARE EACH QDAY, #1 BOTTLE 11 Refills         Prov: Victor M Field         4/12/18       Montelukast Sodium (Singulair*) 10 Mg Tab      10 MG PO HS, #30 TAB 11 Refills         Prov: Victor M Field         4/12/18       Omeprazole (Omeprazole*) 40 Mg Capsule      40 MG PO BID, #60 CAP 2 Refills         Prov: Victor M Field         1/8/18       Albuterol/Ipratropium (Duoneb) 3 Ml Ampul.neb      3 ML INH Q4H PRN for SHORTNESS OF BREATH, #120 NEB 6 Refills         Prov: Victor M Field         4/17/17       Demetrius-Mometasone (Nasonex*) 17 Gm Naspr      2 PUFFS NARE EACH QDAY, #1 BOTTLE 6 Refills         Prov: Victor M Field         12/29/16       Nebulizer/Compressor (Nebulizer) 1 Each Each      1 EACH XX ONCE, #1 EACH 0 Refills         Prov: Victor M Field         12/21/16       Buprenorphine Hcl (BUPRENORPHINE HCL) 8 Mg Tab.subl      24 MG SL QDAY, #30 TAB.SL         Reported         4/8/16       SUMAtriptan Succinate (Imitrex) 100 Mg Tab      100 MG PO QDAY PRN for MIGRAINES, TAB         Reported         4/8/16       Cetirizine Hcl (CETIRIZINE HCL) 10 Mg Tablet      10 MG PO QDAY, #30 TAB 0 Refills         Reported         4/8/16       Montelukast Sodium (Singulair*) 10 Mg Tab      10 MG PO QDAY, #30 TAB 6 Refills         Prov: Victor M Field         12/1/15       Desvenlafaxine Succinate (Pristiq) 100 Mg Tab.sr.24h      100 MG PO QDAY, TAB.SR         Reported         12/1/15       tiZANidine HCl (tiZANidine HCl) 4 Mg Tab      4 MG PO TID PRN for MUSCLE SPASMS, TAB         Reported         12/1/15       Amitriptyline HCl (Amitriptyline HCl)  50 Mg Tablet      50 MG PO HS, #30 TAB         Reported         11/24/14       rOPINIRole HCl (Requip) 2 Mg Tab      2 MG PO HS, TAB         Reported         7/14/14      Current Medications      Current Medications Reviewed 3/4/19            EXAM      GEN-patient appears stated age resting comfortable in no acute distress      Eyes-PERRL,  conjunctiva are normal in appearance extraocular muscles are     intact, no scleral icterus      Nasal-both nares are patent turbinates appear normal no polyps seen no nasal     discharge or ulcerations      Ears-tympanic membranes are normal no erythema no bulging, normal to inspection      Lymphatic-no swollen or enlarged cervical nodes, or axillary node, or femoral     nodes, or supraclavicular nodes      Mouth normal dentition, no erythema no ulcerations oropharynx appears normal no     exudate no evidence of postnasal drip, MP(default value)      Neck-there are no palpable supraclavicular or cervical adenopathy, thyroid is     normal in appearance no apparent nodules, there is no inspiratory or expiratory     stridor      Respiratory-trace expiratory wheezing, no rhonchi or crackles appreciated,     normal work of breathing noted.       Cardiovascular-the heart rate is normal and regular S1 and S2 present with no     murmur or extra heart sounds, there is no JVD or pedal edema present      GI-the abdomen is normal in appearance, bowel sounds present and normal in all     quadrants no hepatosplenomegaly or masses felt      Extremities-no clubbing is present, pulses present in all extremities, capillary    refill time is normal      Musculoskeletal-Normal strength in upper and lower extremities, inspection shows    no evidence of muscle atrophy      Skin-skin is normal in appearance it is warm and dry, no rashes present, no     evidence of cyanosis, palpation reveals no masses      Neurological-the patient is alert and oriented to time place and person, moves     all 4  extremities, normal gait, normal affect and mood, CN2-12 intact      Psych-normal judgment and insight is good, normal mood and affect, alert and     oriented to person, place, and time, and date      Vtials      Vitals:             Height 5 ft 1 in / 154.94 cm           Weight 164 lbs 8 oz / 74.79572 kg           BSA 1.74 m2           BMI 31.1 kg/m2           Temperature 97.6 F / 36.44 C - Oral           Pulse 82           Respirations 14           Blood Pressure 109/62 Sitting, Right Arm           Pulse Oximetry 99%, roomair            REVIEW      Results Reviewed      P[facility] Results Reviewed?:  Yes Prev Lab Results, Yes Prev Radiology Results, Yes     Previous [facility] Records            Assessment      Sarcoidosis - D86.9            Mediastinal adenopathy - R59.0            Moderate persistent asthma - J45.40            Notes      New Medications      * Azithromycin Z-Arvin (Zithromax Z-Arvin) 250 MG TABLET: 250 MG PO ASDIR #1         Instructions: Take 500 mg (two tablets) by mouth the first day, then 250 mg        (one tablet) daily until all taken.      Renewed Medications      * ONDANSETRON ODT 4 MG TAB.RAPDIS: 4 MG PO Q8H PRN NAUSEA #25      New Diagnostics      * CBC, As Soon As Possible         Dx: Sarcoidosis - D86.9      * Comp Metabolic Panel, As Soon As Possible         Dx: Sarcoidosis - D86.9      ASSESSMENT:      1. Sarcoidosis now on methotrexate 7.5 mg weekly with folic acid.       2. Moderate persistent asthma with recent positive methacholine challenge test.       3. Dyspnea.       4. Wheezing.       5. Hypogammaglobulinemia      6. Toxic drug monitoring.             PLAN:      1. I will have the patient continue on her current dose of methotrexate and     folic acid for now.      2. I will check a comprehensive metabolic profile and CBC today for monitoring     while on methotrexate, and if her renal function and LFTs are stable, I will     have her increase the dose to 10mg weekly.       3. I have  given her a Z-pack to use for worsening dyspnea, coughing, wheezing or    purulent sputum production between now and her next visit. I have instructed her    to not take any Zofran while she is on Azithromycin of she needs to take given     the risk of QT prolongation.       4. Follow up with Dr. Field in 1 month, sooner if needed.            Patient Education      Patient Education Provided:  How to use an Inhaler, Sarcoidosis      Time Spent:  > 50% /[facility]            Patient Education:        Sarcoidosis                 Disclaimer: Converted document may not contain table formatting or lab diagrams. Please see [facility] for the authenticated document.   122

## 2024-02-16 ENCOUNTER — OFFICE VISIT (OUTPATIENT)
Dept: PULMONOLOGY | Facility: CLINIC | Age: 54
End: 2024-02-16
Payer: COMMERCIAL

## 2024-02-16 VITALS
BODY MASS INDEX: 25.22 KG/M2 | OXYGEN SATURATION: 97 % | TEMPERATURE: 96.9 F | DIASTOLIC BLOOD PRESSURE: 82 MMHG | HEART RATE: 66 BPM | HEIGHT: 61 IN | WEIGHT: 133.6 LBS | SYSTOLIC BLOOD PRESSURE: 110 MMHG | RESPIRATION RATE: 18 BRPM

## 2024-02-16 DIAGNOSIS — R06.09 DYSPNEA ON EXERTION: ICD-10-CM

## 2024-02-16 DIAGNOSIS — D86.0 PULMONARY SARCOIDOSIS: ICD-10-CM

## 2024-02-16 DIAGNOSIS — F17.211 NICOTINE DEPENDENCE, CIGARETTES, IN REMISSION: ICD-10-CM

## 2024-02-16 DIAGNOSIS — J82.83 EOSINOPHILIC ASTHMA: Primary | ICD-10-CM

## 2024-02-16 DIAGNOSIS — A31.0 MYCOBACTERIUM AVIUM COMPLEX: ICD-10-CM

## 2024-02-16 DIAGNOSIS — R06.2 WHEEZING: ICD-10-CM

## 2024-02-16 RX ORDER — CLINDAMYCIN HYDROCHLORIDE 150 MG/1
CAPSULE ORAL
COMMUNITY
Start: 2024-02-08

## 2024-02-16 RX ORDER — PREDNISONE 50 MG/1
50 TABLET ORAL DAILY
Qty: 5 TABLET | Refills: 1 | Status: SHIPPED | OUTPATIENT
Start: 2024-02-16

## 2024-03-07 DIAGNOSIS — J45.50 SEVERE PERSISTENT ASTHMA, UNSPECIFIED WHETHER COMPLICATED: ICD-10-CM

## 2024-03-07 DIAGNOSIS — D86.0 PULMONARY SARCOIDOSIS: ICD-10-CM

## 2024-03-07 DIAGNOSIS — J82.83 EOSINOPHILIC ASTHMA: ICD-10-CM

## 2024-03-07 RX ORDER — BENRALIZUMAB 30 MG/ML
30 INJECTION, SOLUTION SUBCUTANEOUS
Qty: 1 ML | Refills: 6 | Status: SHIPPED | OUTPATIENT
Start: 2024-03-07

## 2024-03-14 ENCOUNTER — TELEPHONE (OUTPATIENT)
Dept: PULMONOLOGY | Facility: CLINIC | Age: 54
End: 2024-03-14

## 2024-03-15 ENCOUNTER — TELEPHONE (OUTPATIENT)
Dept: PULMONOLOGY | Facility: CLINIC | Age: 54
End: 2024-03-15

## 2024-03-15 NOTE — TELEPHONE ENCOUNTER
Caller: Lamar Vee    Relationship to patient: Self    Best call back number: [phone]     Patient is needing: PATIENT WAS TOLD SHE IS NOT LONGER ELIGIBLE THROUGH THE COMPANY FOR FASENRA - PLEASE CALL BACK AND ADVISE ABOUT ALTERNATIVES OF IF SHE CAN GET THIS MEDICATION THROUGH THE PRACTICE. THANK YOU.

## 2024-04-17 ENCOUNTER — TELEPHONE (OUTPATIENT)
Dept: PULMONOLOGY | Facility: CLINIC | Age: 54
End: 2024-04-17

## 2024-04-17 NOTE — TELEPHONE ENCOUNTER
Caller: Lamar Vee      Relationship:SELF     Best call back number: [phone]      Who are you requesting to speak with (clinical staff, provider,  specific staff member):      What was the call regarding: PT CALLED WANTING TO SPEAK WITH SOMEONE REGARDING NOT BEING ABLE TO AFFORD Benralizumab (Fasenra Pen) 30 MG/ML solution auto-injector [[id number]] (Order [id number]). DOES WANT TO RECEIVE A CALL BACK REGARDING HER OTHER OPTIONS.

## 2024-04-18 NOTE — TELEPHONE ENCOUNTER
I called and spoke with patient, informed of needing to contact the pharmacy she gets the medication from for patient assistance or [facility] I provided her the number. Patient verbalized understanding and stated she would let us know.

## 2024-04-30 ENCOUNTER — HOSPITAL ENCOUNTER (OUTPATIENT)
Dept: GENERAL RADIOLOGY | Facility: HOSPITAL | Age: 54
Discharge: HOME | End: 2024-04-30
Payer: COMMERCIAL

## 2024-04-30 ENCOUNTER — TELEPHONE (OUTPATIENT)
Dept: PULMONOLOGY | Facility: CLINIC | Age: 54
End: 2024-04-30

## 2024-04-30 ENCOUNTER — LAB (OUTPATIENT)
Dept: LAB | Facility: HOSPITAL | Age: 54
End: 2024-04-30
Payer: COMMERCIAL

## 2024-04-30 ENCOUNTER — OFFICE VISIT (OUTPATIENT)
Dept: PULMONOLOGY | Facility: CLINIC | Age: 54
End: 2024-04-30
Payer: COMMERCIAL

## 2024-04-30 VITALS
WEIGHT: 132 LBS | SYSTOLIC BLOOD PRESSURE: 123 MMHG | RESPIRATION RATE: 16 BRPM | BODY MASS INDEX: 24.92 KG/M2 | TEMPERATURE: 97.4 F | DIASTOLIC BLOOD PRESSURE: 57 MMHG | HEIGHT: 61 IN | HEART RATE: 67 BPM | OXYGEN SATURATION: 99 %

## 2024-04-30 DIAGNOSIS — R09.89 CHEST CONGESTION: ICD-10-CM

## 2024-04-30 DIAGNOSIS — R06.09 DOE (DYSPNEA ON EXERTION): ICD-10-CM

## 2024-04-30 DIAGNOSIS — J45.50 SEVERE PERSISTENT ASTHMA, UNSPECIFIED WHETHER COMPLICATED: Primary | Chronic | ICD-10-CM

## 2024-04-30 DIAGNOSIS — R06.89 AIRWAY CLEARANCE IMPAIRMENT: ICD-10-CM

## 2024-04-30 DIAGNOSIS — J45.50 SEVERE PERSISTENT ASTHMA, UNSPECIFIED WHETHER COMPLICATED: ICD-10-CM

## 2024-04-30 DIAGNOSIS — A31.0 MYCOBACTERIUM AVIUM COMPLEX: ICD-10-CM

## 2024-04-30 LAB
BASOPHILS # BLD AUTO: 0.01 10*3/MM3 (ref 0–0.2)
BASOPHILS NFR BLD AUTO: 0.1 % (ref 0–1.5)
DEPRECATED RDW RBC AUTO: 39.9 FL (ref 37–54)
EOSINOPHIL # BLD AUTO: 0 10*3/MM3 (ref 0–0.4)
EOSINOPHIL NFR BLD AUTO: 0 % (ref 0.3–6.2)
ERYTHROCYTE [DISTWIDTH] IN BLOOD BY AUTOMATED COUNT: 12.5 % (ref 12.3–15.4)
HCT VFR BLD AUTO: 38 % (ref 34–46.6)
HGB BLD-MCNC: 12.7 G/DL (ref 12–15.9)
IMM GRANULOCYTES # BLD AUTO: 0.02 10*3/MM3 (ref 0–0.05)
IMM GRANULOCYTES NFR BLD AUTO: 0.3 % (ref 0–0.5)
LYMPHOCYTES # BLD AUTO: 1.04 10*3/MM3 (ref 0.7–3.1)
LYMPHOCYTES NFR BLD AUTO: 15.4 % (ref 19.6–45.3)
MCH RBC QN AUTO: 28.9 PG (ref 26.6–33)
MCHC RBC AUTO-ENTMCNC: 33.4 G/DL (ref 31.5–35.7)
MCV RBC AUTO: 86.6 FL (ref 79–97)
MONOCYTES # BLD AUTO: 0.42 10*3/MM3 (ref 0.1–0.9)
MONOCYTES NFR BLD AUTO: 6.2 % (ref 5–12)
NEUTROPHILS NFR BLD AUTO: 5.26 10*3/MM3 (ref 1.7–7)
NEUTROPHILS NFR BLD AUTO: 78 % (ref 42.7–76)
NRBC BLD AUTO-RTO: 0 /100 WBC (ref 0–0.2)
PLATELET # BLD AUTO: 202 10*3/MM3 (ref 140–450)
PMV BLD AUTO: 11 FL (ref 6–12)
RBC # BLD AUTO: 4.39 10*6/MM3 (ref 3.77–5.28)
WBC NRBC COR # BLD AUTO: 6.75 10*3/MM3 (ref 3.4–10.8)

## 2024-04-30 PROCEDURE — 85025 COMPLETE CBC W/AUTO DIFF WBC: CPT

## 2024-04-30 PROCEDURE — 82785 ASSAY OF IGE: CPT

## 2024-04-30 PROCEDURE — 36415 COLL VENOUS BLD VENIPUNCTURE: CPT

## 2024-04-30 PROCEDURE — 71046 X-RAY EXAM CHEST 2 VIEWS: CPT

## 2024-04-30 RX ORDER — HYDROCODONE BITARTRATE AND ACETAMINOPHEN 5; 325 MG/1; MG/1
TABLET ORAL
COMMUNITY
Start: 2024-03-14

## 2024-04-30 RX ORDER — RIFAMPIN 300 MG/1
600 CAPSULE ORAL 3 TIMES WEEKLY
Qty: 24 CAPSULE | Refills: 2 | Status: SHIPPED | OUTPATIENT
Start: 2024-05-01

## 2024-04-30 RX ORDER — PREDNISONE 20 MG/1
40 TABLET ORAL DAILY
Qty: 14 TABLET | Refills: 0 | Status: ON HOLD | OUTPATIENT
Start: 2024-04-30 | End: 2024-05-03

## 2024-04-30 RX ORDER — LEVOFLOXACIN 500 MG/1
1 TABLET, FILM COATED ORAL DAILY
COMMUNITY
Start: 2024-02-26 | End: 2024-04-30

## 2024-04-30 NOTE — TELEPHONE ENCOUNTER
Spoke with Anabella perez to get patient scheduled for 5/3 arrive at 7:30am. Patient received instruction sheet at check out.

## 2024-04-30 NOTE — H&P (VIEW-ONLY)
Primary Care Provider  Everett Tapia MD     Referring Provider  No ref. provider found     Chief Complaint  Shortness of Breath (With exertion ), Cough (Unable to cough anything up ), and Acute Visit (X 2-3 days )    Subjective          Lamar Vee presents to [facility]  History of Present Illness  Lamar Vee is a 53 y.o. female patient of Dr. Field here for management of MAC infection, pulmonary sarcoidosis, asthma, chronic cough, and seasonal allergies/allergic rhinitis.  She is here for an acute visit.    Patient states that for the past few days she has had more shortness of breath and a nonproductive cough.  She has been undergoing therapy for MAC infection for the past year.  Given patient's inability to expectorate any sputum, we will have her set up for a repeat bronchoscopy with Dr. Field on 5/3/2024.  This time, she continues to take ethambutol, azithromycin and rifampin.  She is also taking Symbicort and Spiriva as prescribed.  She is intermittently using her albuterol as needed.  She states that she has been off Fasenra for the past year or so and can tell a difference.  She states that she has needed multiple rounds of steroids.  I have explained the process of getting biologic therapy restarted for the patient.  We will need updated blood work along with a methacholine challenge test.  Will plan for her to have the methacholine challenge test when she is feeling well.  I encouraged her to get her blood work today before she starts any additional steroid treatment.  Otherwise, she has no additional concerns at this time.  She is still able to perform her ADLs without difficulty.  He is up-to-date with her flu and pneumonia vaccines.     Her history of smoking is   Tobacco Use: Low Risk  (4/30/2024)    Patient History     Smoking Tobacco Use: Never     Smokeless Tobacco Use: Never     Passive Exposure: Never   .    Review of Systems  "  Constitutional:  Negative for chills, fatigue, fever, unexpected weight gain and unexpected weight loss.   HENT:  Congestion: Nasal.    Respiratory:  Positive for cough and shortness of breath. Negative for apnea and wheezing.         Negative for Hemoptysis     Cardiovascular:  Negative for chest pain, palpitations and leg swelling.   Skin:         Negative for cyanosis      Sleep: Negative for Excessive daytime sleepiness  Negative for morning headaches  Negative for Snoring    Family History   Problem Relation Age of Onset    Cancer Mother     Cancer Maternal Aunt     Heart disease Maternal Aunt     Malig Hyperthermia Neg Hx         Social History     Socioeconomic History    Marital status:    Tobacco Use    Smoking status: Never     Passive exposure: Never    Smokeless tobacco: Never   Vaping Use    Vaping status: Never Used   Substance and Sexual Activity    Alcohol use: Not Currently    Drug use: Not Currently     Comment: Pt states \"opiate addiction\".    Sexual activity: Defer        Past Medical History:   Diagnosis Date    Anxiety and depression     Asthma     Disease of thyroid gland     H/O: substance abuse     opiates    Hypertension     Melanoma 1992    Migraine     Neuropathy     Sarcoidosis     Seasonal allergies         Immunization History   Administered Date(s) Administered    COVID-19 (MODERNA) 1st,2nd,3rd Dose Monovalent 12/23/2020, 01/20/2021    Flu Vaccine Quad PF >36MO 11/19/2014, 11/12/2015, 09/13/2016, 09/19/2017    Flublok 18+yrs 11/01/2022    Fluzone (or Fluarix & Flulaval for VFC) >6mos 11/19/2014, 11/12/2015, 09/13/2016, 09/19/2017, 09/27/2023    Pneumococcal Conjugate 13-Valent (PCV13) 09/13/2016, 09/19/2017    Pneumococcal Conjugate 20-Valent (PCV20) 09/27/2023    Pneumococcal Polysaccharide (PPSV23) 11/19/2014         Allergies   Allergen Reactions    Morphine Headache    Naloxone     Pentazocine Unknown - Low Severity    Tetanus Toxoids     Toradol [Ketorolac Tromethamine] "     Tyloxapol           Current Outpatient Medications:     albuterol sulfate  (90 Base) MCG/ACT inhaler, INHALE TWO PUFFS BY MOUTH EVERY 4 HOURS, Disp: 8.5 g, Rfl: 11    amitriptyline (ELAVIL) 100 MG tablet, Take 1 tablet by mouth every night at bedtime., Disp: , Rfl:     azithromycin (ZITHROMAX) 250 MG tablet, Take 2 tablets by mouth 3 (Three) Times a Week., Disp: 30 tablet, Rfl: 11    Benralizumab (Fasenra Pen) 30 MG/ML solution auto-injector, Inject 30 mg under the skin into the appropriate area as directed Every 2 (Two) Months., Disp: 1 mL, Rfl: 6    budesonide-formoterol (Symbicort) 160-4.5 MCG/ACT inhaler, Inhale 2 puffs 2 (Two) Times a Day., Disp: 3 each, Rfl: 3    buprenorphine (SUBUTEX) 8 MG sublingual tablet SL tablet, Dissolve THREE tablets UNDER THE TONGUE EVERY DAY, Disp: , Rfl: 0    clindamycin (CLEOCIN) 150 MG capsule, TAKE ONE CAPSULE BY MOUTH THREE TIMES DAILY UNTIL GONE, Disp: , Rfl:     clonazePAM (KlonoPIN) 1 MG tablet, Take 1 tablet by mouth 2 (Two) Times a Day., Disp: , Rfl: 0    ethambutol (MYAMBUTOL) 400 MG tablet, Take 4.5 tablets by mouth 3 (Three) Times a Week., Disp: 54 tablet, Rfl: 11    fluticasone (Flonase) 50 MCG/ACT nasal spray, 2 sprays into the nostril(s) as directed by provider Daily., Disp: 1 g, Rfl: 3    furosemide (LASIX) 20 MG tablet, Take 1 tablet by mouth Daily., Disp: , Rfl:     HYDROcodone-acetaminophen (NORCO) 5-325 MG per tablet, TAKE ONE TABLET BY MOUTH FOUR TIMES DAILY AS NEEDED FOR THREE DAYS, Disp: , Rfl:     lamoTRIgine (LaMICtal) 200 MG tablet, Take 1 tablet by mouth Daily., Disp: , Rfl:     Linzess 290 MCG capsule capsule, Take 1 capsule by mouth Daily., Disp: , Rfl:     ondansetron ODT (ZOFRAN-ODT) 8 MG disintegrating tablet, Place 1 tablet on the tongue Every 12 (Twelve) Hours As Needed for Nausea or Vomiting., Disp: , Rfl:     pantoprazole (PROTONIX) 40 MG EC tablet, TAKE ONE TABLET BY MOUTH DAILY, Disp: 30 tablet, Rfl: 3    [START ON 5/1/2024] rifAMPin  (Rifadin) 300 MG capsule, Take 2 capsules by mouth 3 (Three) Times a Week., Disp: 24 capsule, Rfl: 2    Rimegepant Sulfate (Nurtec) 75 MG tablet dispersible tablet, Take 1 tablet by mouth As Needed (Migraine)., Disp: , Rfl:     Synthroid 25 MCG tablet, Take 1 tablet by mouth Daily., Disp: , Rfl:     tiZANidine (ZANAFLEX) 4 MG tablet, Take 2 tablets by mouth Every 8 (Eight) Hours As Needed for Muscle Spasms., Disp: , Rfl: 5    benzonatate (TESSALON) 200 MG capsule, Take 1 capsule by mouth 3 (Three) Times a Day As Needed. (Patient not taking: Reported on 5/16/2023), Disp: , Rfl:     cetirizine (zyrTEC) 10 MG tablet, Take 1 tablet by mouth Daily. (Patient not taking: Reported on 5/16/2023), Disp: , Rfl: 3    guaifenesin-dextromethorphan (MUCINEX DM)  MG tablet sustained-release 12 hour tablet, Take 2 tablets by mouth 2 (Two) Times a Day As Needed (congestion). (Patient not taking: Reported on 11/17/2023), Disp: 120 each, Rfl: 0    ibuprofen (ADVIL,MOTRIN) 600 MG tablet, Take 1 tablet by mouth Every 8 (Eight) Hours As Needed for Mild Pain. (Patient not taking: Reported on 2/16/2024), Disp: , Rfl: 1    levothyroxine (SYNTHROID, LEVOTHROID) 100 MCG tablet, Take 1 tablet by mouth Every Morning. (Patient not taking: Reported on 2/16/2024), Disp: , Rfl:     predniSONE (DELTASONE) 20 MG tablet, Take 2 tablets by mouth Daily for 7 days., Disp: 14 tablet, Rfl: 0    PROAIR RESPICLICK 108 (90 BASE) MCG/ACT inhaler, USE ONE PUFF FOUR TIMES DAILY (Patient not taking: Reported on 5/16/2023), Disp: , Rfl: 5    promethazine-dextromethorphan (PROMETHAZINE-DM) 6.25-15 MG/5ML syrup, 5 mL. (Patient not taking: Reported on 11/17/2023), Disp: , Rfl:     rOPINIRole (REQUIP) 2 MG tablet, TAKE ONE TABLET BY MOUTH AT BEDTIME (Patient not taking: Reported on 5/16/2023), Disp: , Rfl: 5    saccharomyces boulardii (Florastor) 250 MG capsule, Take 1 capsule by mouth 2 (Two) Times a Day. (Patient not taking: Reported on 2/16/2024), Disp: 30  "capsule, Rfl: 11    tiotropium (SPIRIVA) 18 MCG per inhalation capsule, Place 1 capsule into inhaler and inhale Daily for 30 days., Disp: 30 capsule, Rfl: 0    tiotropium bromide monohydrate (Spiriva Respimat) 2.5 MCG/ACT aerosol solution inhaler, Inhale 2 puffs Daily. (Patient not taking: Reported on 2/16/2024), Disp: 2 each, Rfl: 0    tiotropium bromide monohydrate (Spiriva Respimat) 2.5 MCG/ACT aerosol solution inhaler, Inhale 2 puffs Daily. (Patient not taking: Reported on 2/16/2024), Disp: 1 each, Rfl: 11     Objective   Physical Exam  Constitutional:       General: She is not in acute distress.     Appearance: Normal appearance. She is normal weight.   HENT:      Right Ear: Hearing normal.      Left Ear: Hearing normal.      Nose: No nasal tenderness or congestion.      Mouth/Throat:      Mouth: Mucous membranes are moist. No oral lesions.   Eyes:      Extraocular Movements: Extraocular movements intact.      Pupils: Pupils are equal, round, and reactive to light.   Cardiovascular:      Rate and Rhythm: Normal rate and regular rhythm.      Pulses: Normal pulses.      Heart sounds: Normal heart sounds. No murmur heard.  Pulmonary:      Effort: Pulmonary effort is normal.      Breath sounds: Wheezing present. No rhonchi or rales.   Musculoskeletal:      Right lower leg: No edema.      Left lower leg: No edema.   Skin:     General: Skin is warm and dry.      Findings: No lesion or rash.   Neurological:      General: No focal deficit present.      Mental Status: She is alert and oriented to person, place, and time.   Psychiatric:         Mood and Affect: Affect normal. Mood is not anxious or depressed.         Vital Signs:   /57 (BP Location: Right arm, Patient Position: Sitting, Cuff Size: Adult)   Pulse 67   Temp 97.4 °F (36.3 °C) (Oral)   Resp 16   Ht 154.9 cm (61\")   Wt 59.9 kg (132 lb)   SpO2 99% Comment: RA  BMI 24.94 kg/m²        Result Review :   The following data was reviewed by: Rebecca " OSMAN Cole on 04/30/2024:  Personally reviewed respiratory culture, AFB culture, fungus culture and cytology from 5/2/2023    Data reviewed : Radiologic studies chest x-ray 8/17/2021, pulmonary function test 1/25/2019 and my last office note    Procedures        Assessment and Plan    Diagnoses and all orders for this visit:    1. Severe persistent asthma, unspecified whether complicated (Primary)  Comments:  Continue Symbicort and Spiriva  Orders:  -     XR Chest 2 View; Future  -     CBC & Differential; Future  -     IgE Level; Future  -     Bronchial Challenge With Methacholine; Future  -     Case Request; Standing  -     Follow Anesthesia Guidlines / Standing Orders; Standing  -     Obtain Informed Consent; Standing  -     Case Request  -     predniSONE (DELTASONE) 20 MG tablet; Take 2 tablets by mouth Daily for 7 days.  Dispense: 14 tablet; Refill: 0    2. Chest congestion  Comments:  Bronchoscopy ordered  Orders:  -     XR Chest 2 View; Future  -     Case Request; Standing  -     Follow Anesthesia Guidlines / Standing Orders; Standing  -     Obtain Informed Consent; Standing  -     Case Request  -     predniSONE (DELTASONE) 20 MG tablet; Take 2 tablets by mouth Daily for 7 days.  Dispense: 14 tablet; Refill: 0    3. CAN (dyspnea on exertion)  Comments:  Continue albuterol  Orders:  -     XR Chest 2 View; Future    4. Mycobacterium avium complex  Comments:  Continue azithromycin, ethambutol and rifampin.  Bronchoscopy ordered  Orders:  -     Case Request; Standing  -     Follow Anesthesia Guidlines / Standing Orders; Standing  -     Obtain Informed Consent; Standing  -     Case Request  -     rifAMPin (Rifadin) 300 MG capsule; Take 2 capsules by mouth 3 (Three) Times a Week.  Dispense: 24 capsule; Refill: 2    5. Airway clearance impairment  Comments:  Bronchoscopy ordered    I have obtained consent for bronchoscopy with brushings, biopsies, and bronchioloalveolar lavage.  Risks and benefits of bronchoscopy  discussed with patient today.  Risks include pneumothorax, hemothorax, bleeding, hypoxia, required mechanical ventilation and death.  The patient recognizes these findings, acknowledges these findings and is agreeable to the procedure.     I spent 43 minutes caring for Lamar on this date of service. This time includes time spent by me in the following activities:preparing for the visit, reviewing tests, obtaining and/or reviewing a separately obtained history, performing a medically appropriate examination and/or evaluation , counseling and educating the patient/family/caregiver, ordering medications, tests, or procedures, referring and communicating with other health care professionals , documenting information in the medical record, and care coordination    Follow Up   Return in about 24 days (around 5/24/2024) for bronch follow up with Rebecca.  Patient was given instructions and counseling regarding her condition or for health maintenance advice. Please see specific information pulled into the AVS if appropriate.

## 2024-04-30 NOTE — PROGRESS NOTES
Primary Care Provider  Everett Tapia MD     Referring Provider  No ref. provider found     Chief Complaint  Shortness of Breath (With exertion ), Cough (Unable to cough anything up ), and Acute Visit (X 2-3 days )    Subjective          Lamar Vee presents to [facility]  History of Present Illness  Lamar Vee is a 53 y.o. female patient of Dr. Field here for management of MAC infection, pulmonary sarcoidosis, asthma, chronic cough, and seasonal allergies/allergic rhinitis.  She is here for an acute visit.    Patient states that for the past few days she has had more shortness of breath and a nonproductive cough.  She has been undergoing therapy for MAC infection for the past year.  Given patient's inability to expectorate any sputum, we will have her set up for a repeat bronchoscopy with Dr. Field on 5/3/2024.  This time, she continues to take ethambutol, azithromycin and rifampin.  She is also taking Symbicort and Spiriva as prescribed.  She is intermittently using her albuterol as needed.  She states that she has been off Fasenra for the past year or so and can tell a difference.  She states that she has needed multiple rounds of steroids.  I have explained the process of getting biologic therapy restarted for the patient.  We will need updated blood work along with a methacholine challenge test.  Will plan for her to have the methacholine challenge test when she is feeling well.  I encouraged her to get her blood work today before she starts any additional steroid treatment.  Otherwise, she has no additional concerns at this time.  She is still able to perform her ADLs without difficulty.  He is up-to-date with her flu and pneumonia vaccines.     Her history of smoking is   Tobacco Use: Low Risk  (4/30/2024)    Patient History     Smoking Tobacco Use: Never     Smokeless Tobacco Use: Never     Passive Exposure: Never   .    Review of Systems  "  Constitutional:  Negative for chills, fatigue, fever, unexpected weight gain and unexpected weight loss.   HENT:  Congestion: Nasal.    Respiratory:  Positive for cough and shortness of breath. Negative for apnea and wheezing.         Negative for Hemoptysis     Cardiovascular:  Negative for chest pain, palpitations and leg swelling.   Skin:         Negative for cyanosis      Sleep: Negative for Excessive daytime sleepiness  Negative for morning headaches  Negative for Snoring    Family History   Problem Relation Age of Onset    Cancer Mother     Cancer Maternal Aunt     Heart disease Maternal Aunt     Malig Hyperthermia Neg Hx         Social History     Socioeconomic History    Marital status:    Tobacco Use    Smoking status: Never     Passive exposure: Never    Smokeless tobacco: Never   Vaping Use    Vaping status: Never Used   Substance and Sexual Activity    Alcohol use: Not Currently    Drug use: Not Currently     Comment: Pt states \"opiate addiction\".    Sexual activity: Defer        Past Medical History:   Diagnosis Date    Anxiety and depression     Asthma     Disease of thyroid gland     H/O: substance abuse     opiates    Hypertension     Melanoma 1992    Migraine     Neuropathy     Sarcoidosis     Seasonal allergies         Immunization History   Administered Date(s) Administered    COVID-19 (MODERNA) 1st,2nd,3rd Dose Monovalent 12/23/2020, 01/20/2021    Flu Vaccine Quad PF >36MO 11/19/2014, 11/12/2015, 09/13/2016, 09/19/2017    Flublok 18+yrs 11/01/2022    Fluzone (or Fluarix & Flulaval for VFC) >6mos 11/19/2014, 11/12/2015, 09/13/2016, 09/19/2017, 09/27/2023    Pneumococcal Conjugate 13-Valent (PCV13) 09/13/2016, 09/19/2017    Pneumococcal Conjugate 20-Valent (PCV20) 09/27/2023    Pneumococcal Polysaccharide (PPSV23) 11/19/2014         Allergies   Allergen Reactions    Morphine Headache    Naloxone     Pentazocine Unknown - Low Severity    Tetanus Toxoids     Toradol [Ketorolac Tromethamine] "     Tyloxapol           Current Outpatient Medications:     albuterol sulfate  (90 Base) MCG/ACT inhaler, INHALE TWO PUFFS BY MOUTH EVERY 4 HOURS, Disp: 8.5 g, Rfl: 11    amitriptyline (ELAVIL) 100 MG tablet, Take 1 tablet by mouth every night at bedtime., Disp: , Rfl:     azithromycin (ZITHROMAX) 250 MG tablet, Take 2 tablets by mouth 3 (Three) Times a Week., Disp: 30 tablet, Rfl: 11    Benralizumab (Fasenra Pen) 30 MG/ML solution auto-injector, Inject 30 mg under the skin into the appropriate area as directed Every 2 (Two) Months., Disp: 1 mL, Rfl: 6    budesonide-formoterol (Symbicort) 160-4.5 MCG/ACT inhaler, Inhale 2 puffs 2 (Two) Times a Day., Disp: 3 each, Rfl: 3    buprenorphine (SUBUTEX) 8 MG sublingual tablet SL tablet, Dissolve THREE tablets UNDER THE TONGUE EVERY DAY, Disp: , Rfl: 0    clindamycin (CLEOCIN) 150 MG capsule, TAKE ONE CAPSULE BY MOUTH THREE TIMES DAILY UNTIL GONE, Disp: , Rfl:     clonazePAM (KlonoPIN) 1 MG tablet, Take 1 tablet by mouth 2 (Two) Times a Day., Disp: , Rfl: 0    ethambutol (MYAMBUTOL) 400 MG tablet, Take 4.5 tablets by mouth 3 (Three) Times a Week., Disp: 54 tablet, Rfl: 11    fluticasone (Flonase) 50 MCG/ACT nasal spray, 2 sprays into the nostril(s) as directed by provider Daily., Disp: 1 g, Rfl: 3    furosemide (LASIX) 20 MG tablet, Take 1 tablet by mouth Daily., Disp: , Rfl:     HYDROcodone-acetaminophen (NORCO) 5-325 MG per tablet, TAKE ONE TABLET BY MOUTH FOUR TIMES DAILY AS NEEDED FOR THREE DAYS, Disp: , Rfl:     lamoTRIgine (LaMICtal) 200 MG tablet, Take 1 tablet by mouth Daily., Disp: , Rfl:     Linzess 290 MCG capsule capsule, Take 1 capsule by mouth Daily., Disp: , Rfl:     ondansetron ODT (ZOFRAN-ODT) 8 MG disintegrating tablet, Place 1 tablet on the tongue Every 12 (Twelve) Hours As Needed for Nausea or Vomiting., Disp: , Rfl:     pantoprazole (PROTONIX) 40 MG EC tablet, TAKE ONE TABLET BY MOUTH DAILY, Disp: 30 tablet, Rfl: 3    [START ON 5/1/2024] rifAMPin  (Rifadin) 300 MG capsule, Take 2 capsules by mouth 3 (Three) Times a Week., Disp: 24 capsule, Rfl: 2    Rimegepant Sulfate (Nurtec) 75 MG tablet dispersible tablet, Take 1 tablet by mouth As Needed (Migraine)., Disp: , Rfl:     Synthroid 25 MCG tablet, Take 1 tablet by mouth Daily., Disp: , Rfl:     tiZANidine (ZANAFLEX) 4 MG tablet, Take 2 tablets by mouth Every 8 (Eight) Hours As Needed for Muscle Spasms., Disp: , Rfl: 5    benzonatate (TESSALON) 200 MG capsule, Take 1 capsule by mouth 3 (Three) Times a Day As Needed. (Patient not taking: Reported on 5/16/2023), Disp: , Rfl:     cetirizine (zyrTEC) 10 MG tablet, Take 1 tablet by mouth Daily. (Patient not taking: Reported on 5/16/2023), Disp: , Rfl: 3    guaifenesin-dextromethorphan (MUCINEX DM)  MG tablet sustained-release 12 hour tablet, Take 2 tablets by mouth 2 (Two) Times a Day As Needed (congestion). (Patient not taking: Reported on 11/17/2023), Disp: 120 each, Rfl: 0    ibuprofen (ADVIL,MOTRIN) 600 MG tablet, Take 1 tablet by mouth Every 8 (Eight) Hours As Needed for Mild Pain. (Patient not taking: Reported on 2/16/2024), Disp: , Rfl: 1    levothyroxine (SYNTHROID, LEVOTHROID) 100 MCG tablet, Take 1 tablet by mouth Every Morning. (Patient not taking: Reported on 2/16/2024), Disp: , Rfl:     predniSONE (DELTASONE) 20 MG tablet, Take 2 tablets by mouth Daily for 7 days., Disp: 14 tablet, Rfl: 0    PROAIR RESPICLICK 108 (90 BASE) MCG/ACT inhaler, USE ONE PUFF FOUR TIMES DAILY (Patient not taking: Reported on 5/16/2023), Disp: , Rfl: 5    promethazine-dextromethorphan (PROMETHAZINE-DM) 6.25-15 MG/5ML syrup, 5 mL. (Patient not taking: Reported on 11/17/2023), Disp: , Rfl:     rOPINIRole (REQUIP) 2 MG tablet, TAKE ONE TABLET BY MOUTH AT BEDTIME (Patient not taking: Reported on 5/16/2023), Disp: , Rfl: 5    saccharomyces boulardii (Florastor) 250 MG capsule, Take 1 capsule by mouth 2 (Two) Times a Day. (Patient not taking: Reported on 2/16/2024), Disp: 30  "capsule, Rfl: 11    tiotropium (SPIRIVA) 18 MCG per inhalation capsule, Place 1 capsule into inhaler and inhale Daily for 30 days., Disp: 30 capsule, Rfl: 0    tiotropium bromide monohydrate (Spiriva Respimat) 2.5 MCG/ACT aerosol solution inhaler, Inhale 2 puffs Daily. (Patient not taking: Reported on 2/16/2024), Disp: 2 each, Rfl: 0    tiotropium bromide monohydrate (Spiriva Respimat) 2.5 MCG/ACT aerosol solution inhaler, Inhale 2 puffs Daily. (Patient not taking: Reported on 2/16/2024), Disp: 1 each, Rfl: 11     Objective   Physical Exam  Constitutional:       General: She is not in acute distress.     Appearance: Normal appearance. She is normal weight.   HENT:      Right Ear: Hearing normal.      Left Ear: Hearing normal.      Nose: No nasal tenderness or congestion.      Mouth/Throat:      Mouth: Mucous membranes are moist. No oral lesions.   Eyes:      Extraocular Movements: Extraocular movements intact.      Pupils: Pupils are equal, round, and reactive to light.   Cardiovascular:      Rate and Rhythm: Normal rate and regular rhythm.      Pulses: Normal pulses.      Heart sounds: Normal heart sounds. No murmur heard.  Pulmonary:      Effort: Pulmonary effort is normal.      Breath sounds: Wheezing present. No rhonchi or rales.   Musculoskeletal:      Right lower leg: No edema.      Left lower leg: No edema.   Skin:     General: Skin is warm and dry.      Findings: No lesion or rash.   Neurological:      General: No focal deficit present.      Mental Status: She is alert and oriented to person, place, and time.   Psychiatric:         Mood and Affect: Affect normal. Mood is not anxious or depressed.         Vital Signs:   /57 (BP Location: Right arm, Patient Position: Sitting, Cuff Size: Adult)   Pulse 67   Temp 97.4 °F (36.3 °C) (Oral)   Resp 16   Ht 154.9 cm (61\")   Wt 59.9 kg (132 lb)   SpO2 99% Comment: RA  BMI 24.94 kg/m²        Result Review :   The following data was reviewed by: Rebecca " OSMAN Cole on 04/30/2024:  Personally reviewed respiratory culture, AFB culture, fungus culture and cytology from 5/2/2023    Data reviewed : Radiologic studies chest x-ray 8/17/2021, pulmonary function test 1/25/2019 and my last office note    Procedures        Assessment and Plan    Diagnoses and all orders for this visit:    1. Severe persistent asthma, unspecified whether complicated (Primary)  Comments:  Continue Symbicort and Spiriva  Orders:  -     XR Chest 2 View; Future  -     CBC & Differential; Future  -     IgE Level; Future  -     Bronchial Challenge With Methacholine; Future  -     Case Request; Standing  -     Follow Anesthesia Guidlines / Standing Orders; Standing  -     Obtain Informed Consent; Standing  -     Case Request  -     predniSONE (DELTASONE) 20 MG tablet; Take 2 tablets by mouth Daily for 7 days.  Dispense: 14 tablet; Refill: 0    2. Chest congestion  Comments:  Bronchoscopy ordered  Orders:  -     XR Chest 2 View; Future  -     Case Request; Standing  -     Follow Anesthesia Guidlines / Standing Orders; Standing  -     Obtain Informed Consent; Standing  -     Case Request  -     predniSONE (DELTASONE) 20 MG tablet; Take 2 tablets by mouth Daily for 7 days.  Dispense: 14 tablet; Refill: 0    3. CAN (dyspnea on exertion)  Comments:  Continue albuterol  Orders:  -     XR Chest 2 View; Future    4. Mycobacterium avium complex  Comments:  Continue azithromycin, ethambutol and rifampin.  Bronchoscopy ordered  Orders:  -     Case Request; Standing  -     Follow Anesthesia Guidlines / Standing Orders; Standing  -     Obtain Informed Consent; Standing  -     Case Request  -     rifAMPin (Rifadin) 300 MG capsule; Take 2 capsules by mouth 3 (Three) Times a Week.  Dispense: 24 capsule; Refill: 2    5. Airway clearance impairment  Comments:  Bronchoscopy ordered    I have obtained consent for bronchoscopy with brushings, biopsies, and bronchioloalveolar lavage.  Risks and benefits of bronchoscopy  discussed with patient today.  Risks include pneumothorax, hemothorax, bleeding, hypoxia, required mechanical ventilation and death.  The patient recognizes these findings, acknowledges these findings and is agreeable to the procedure.     I spent 43 minutes caring for Lamar on this date of service. This time includes time spent by me in the following activities:preparing for the visit, reviewing tests, obtaining and/or reviewing a separately obtained history, performing a medically appropriate examination and/or evaluation , counseling and educating the patient/family/caregiver, ordering medications, tests, or procedures, referring and communicating with other health care professionals , documenting information in the medical record, and care coordination    Follow Up   Return in about 24 days (around 5/24/2024) for bronch follow up with Rebecca.  Patient was given instructions and counseling regarding her condition or for health maintenance advice. Please see specific information pulled into the AVS if appropriate.

## 2024-05-02 ENCOUNTER — ANESTHESIA EVENT (OUTPATIENT)
Dept: GASTROENTEROLOGY | Facility: HOSPITAL | Age: 54
End: 2024-05-02
Payer: COMMERCIAL

## 2024-05-02 NOTE — ANESTHESIA PREPROCEDURE EVALUATION
Anesthesia Evaluation     Patient summary reviewed and Nursing notes reviewed   NPO Solid Status: > 8 hours  NPO Liquid Status: > 8 hours           Airway   Mallampati: II  TM distance: >3 FB  Neck ROM: full  No difficulty expected  Dental - normal exam     Pulmonary    (+) pneumonia , asthma (inhalers daily),rhonchi, wheezes  Cardiovascular - normal exam  Exercise tolerance: good (4-7 METS)    Rhythm: regular  Rate: normal    (+) hypertension well controlled      Neuro/Psych  (+) headaches, psychiatric history Anxiety and Depression  GI/Hepatic/Renal/Endo    (+) thyroid problem     Musculoskeletal     Abdominal    Substance History      OB/GYN          Other      history of cancer    ROS/Med Hx Other: Mycobacterium, severe chest congestion       Phys Exam Other: Duoneb in preop    96% on RA              Anesthesia Plan    ASA 3     general   total IV anesthesia  (Total IV Anesthesia    Patient understands anesthesia not responsible for dental damage.  )  intravenous induction     Anesthetic plan, risks, benefits, and alternatives have been provided, discussed and informed consent has been obtained with: patient.  Pre-procedure education provided  Plan discussed with CRNA.      CODE STATUS:

## 2024-05-03 ENCOUNTER — HOSPITAL ENCOUNTER (OUTPATIENT)
Facility: HOSPITAL | Age: 54
Setting detail: HOSPITAL OUTPATIENT SURGERY
Discharge: HOME | End: 2024-05-03
Attending: INTERNAL MEDICINE | Admitting: INTERNAL MEDICINE
Payer: COMMERCIAL

## 2024-05-03 ENCOUNTER — ANESTHESIA (OUTPATIENT)
Dept: GASTROENTEROLOGY | Facility: HOSPITAL | Age: 54
End: 2024-05-03
Payer: COMMERCIAL

## 2024-05-03 VITALS
HEART RATE: 81 BPM | SYSTOLIC BLOOD PRESSURE: 118 MMHG | RESPIRATION RATE: 18 BRPM | OXYGEN SATURATION: 96 % | WEIGHT: 132.28 LBS | TEMPERATURE: 98 F | HEIGHT: 61 IN | BODY MASS INDEX: 24.97 KG/M2 | DIASTOLIC BLOOD PRESSURE: 56 MMHG

## 2024-05-03 DIAGNOSIS — J45.50 SEVERE PERSISTENT ASTHMA, UNSPECIFIED WHETHER COMPLICATED: ICD-10-CM

## 2024-05-03 DIAGNOSIS — A31.0 MYCOBACTERIUM AVIUM COMPLEX: ICD-10-CM

## 2024-05-03 DIAGNOSIS — R09.89 CHEST CONGESTION: ICD-10-CM

## 2024-05-03 LAB
ACB CMPLX DNA BAL NAA+NON-PRB-NCNCRNG: NOT DETECTED
BLACTX-M ISLT/SPM QL: NORMAL
BLAIMP ISLT/SPM QL: NORMAL
BLAKPC ISLT/SPM QL: NORMAL
BLAOXA-48-LIKE ISLT/SPM QL: NORMAL
BLAVIM ISLT/SPM QL: NORMAL
C PNEUM DNA NPH QL NAA+NON-PROBE: NOT DETECTED
E CLOAC COMP DNA BAL NAA+NON-PRB-NCNCRNG: NOT DETECTED
E COLI DNA BAL NAA+NON-PRB-NCNCRNG: NOT DETECTED
FLUAV SUBTYP SPEC NAA+PROBE: NOT DETECTED
FLUBV RNA ISLT QL NAA+PROBE: NOT DETECTED
GP B STREP DNA BAL NAA+NON-PRB-NCNCRNG: NOT DETECTED
HADV DNA SPEC NAA+PROBE: NOT DETECTED
HAEM INFLU DNA BAL NAA+NON-PRB-NCNCRNG: NOT DETECTED
HCOV RNA LOWER RESP QL NAA+NON-PROBE: NOT DETECTED
HMPV RNA NPH QL NAA+NON-PROBE: NOT DETECTED
HPIV RNA LOWER RESP QL NAA+NON-PROBE: NOT DETECTED
K AEROGENES DNA BAL NAA+NON-PRB-NCNCRNG: NOT DETECTED
K OXYTOCA DNA BAL NAA+NON-PRB-NCNCRNG: NOT DETECTED
K PNEU GRP DNA BAL NAA+NON-PRB-NCNCRNG: NOT DETECTED
L PNEUMO DNA LOWER RESP QL NAA+NON-PROBE: NOT DETECTED
M CATARRHALIS DNA BAL NAA+NON-PRB-NCNCRNG: NOT DETECTED
M PNEUMO IGG SER IA-ACNC: NOT DETECTED
MECA+MECC ISLT/SPM QL: NORMAL
NDM GENE: NORMAL
P AERUGINOSA DNA BAL NAA+NON-PRB-NCNCRNG: NOT DETECTED
PROTEUS SP DNA BAL NAA+NON-PRB-NCNCRNG: NOT DETECTED
RHINOVIRUS RNA SPEC NAA+PROBE: NOT DETECTED
RSV RNA NPH QL NAA+NON-PROBE: NOT DETECTED
S AUREUS DNA BAL NAA+NON-PRB-NCNCRNG: NOT DETECTED
S MARCESCENS DNA BAL NAA+NON-PRB-NCNCRNG: NOT DETECTED
S PNEUM DNA BAL NAA+NON-PRB-NCNCRNG: NOT DETECTED
S PYO DNA BAL NAA+NON-PRB-NCNCRNG: NOT DETECTED

## 2024-05-03 PROCEDURE — 87205 SMEAR GRAM STAIN: CPT | Performed by: INTERNAL MEDICINE

## 2024-05-03 PROCEDURE — 25810000003 LACTATED RINGERS PER 1000 ML

## 2024-05-03 PROCEDURE — 87633 RESP VIRUS 12-25 TARGETS: CPT | Performed by: INTERNAL MEDICINE

## 2024-05-03 PROCEDURE — 87071 CULTURE AEROBIC QUANT OTHER: CPT | Performed by: INTERNAL MEDICINE

## 2024-05-03 PROCEDURE — 88108 CYTOPATH CONCENTRATE TECH: CPT | Performed by: INTERNAL MEDICINE

## 2024-05-03 PROCEDURE — 87102 FUNGUS ISOLATION CULTURE: CPT | Performed by: INTERNAL MEDICINE

## 2024-05-03 PROCEDURE — 87116 MYCOBACTERIA CULTURE: CPT | Performed by: INTERNAL MEDICINE

## 2024-05-03 PROCEDURE — 25010000002 PROPOFOL 10 MG/ML EMULSION: Performed by: NURSE ANESTHETIST, CERTIFIED REGISTERED

## 2024-05-03 PROCEDURE — 31624 DX BRONCHOSCOPE/LAVAGE: CPT | Performed by: INTERNAL MEDICINE

## 2024-05-03 PROCEDURE — 87206 SMEAR FLUORESCENT/ACID STAI: CPT | Performed by: INTERNAL MEDICINE

## 2024-05-03 RX ORDER — IPRATROPIUM BROMIDE AND ALBUTEROL SULFATE 2.5; .5 MG/3ML; MG/3ML
3 SOLUTION RESPIRATORY (INHALATION) ONCE
Status: COMPLETED | OUTPATIENT
Start: 2024-05-03 | End: 2024-05-03

## 2024-05-03 RX ORDER — FLUCONAZOLE 200 MG/1
200 TABLET ORAL DAILY
Qty: 5 TABLET | Refills: 0 | Status: SHIPPED | OUTPATIENT
Start: 2024-05-03

## 2024-05-03 RX ORDER — SODIUM CHLORIDE, SODIUM LACTATE, POTASSIUM CHLORIDE, CALCIUM CHLORIDE 600; 310; 30; 20 MG/100ML; MG/100ML; MG/100ML; MG/100ML
30 INJECTION, SOLUTION INTRAVENOUS CONTINUOUS
Status: DISCONTINUED | OUTPATIENT
Start: 2024-05-03 | End: 2024-05-03 | Stop reason: HOSPADM

## 2024-05-03 RX ORDER — PROPOFOL 10 MG/ML
VIAL (ML) INTRAVENOUS AS NEEDED
Status: DISCONTINUED | OUTPATIENT
Start: 2024-05-03 | End: 2024-05-03 | Stop reason: SURG

## 2024-05-03 RX ORDER — LIDOCAINE HYDROCHLORIDE 40 MG/ML
INJECTION, SOLUTION RETROBULBAR AS NEEDED
Status: DISCONTINUED | OUTPATIENT
Start: 2024-05-03 | End: 2024-05-03 | Stop reason: HOSPADM

## 2024-05-03 RX ORDER — MAGNESIUM HYDROXIDE 1200 MG/15ML
LIQUID ORAL AS NEEDED
Status: DISCONTINUED | OUTPATIENT
Start: 2024-05-03 | End: 2024-05-03 | Stop reason: HOSPADM

## 2024-05-03 RX ORDER — LIDOCAINE HYDROCHLORIDE 20 MG/ML
INJECTION, SOLUTION EPIDURAL; INFILTRATION; INTRACAUDAL; PERINEURAL AS NEEDED
Status: DISCONTINUED | OUTPATIENT
Start: 2024-05-03 | End: 2024-05-03 | Stop reason: SURG

## 2024-05-03 RX ADMIN — SODIUM CHLORIDE, POTASSIUM CHLORIDE, SODIUM LACTATE AND CALCIUM CHLORIDE 30 ML/HR: 600; 310; 30; 20 INJECTION, SOLUTION INTRAVENOUS at 08:38

## 2024-05-03 RX ADMIN — LIDOCAINE HYDROCHLORIDE 80 MG: 20 INJECTION, SOLUTION INTRAVENOUS at 09:23

## 2024-05-03 RX ADMIN — PROPOFOL 100 MG: 10 INJECTION, EMULSION INTRAVENOUS at 09:23

## 2024-05-03 RX ADMIN — PROPOFOL 200 MCG/KG/MIN: 10 INJECTION, EMULSION INTRAVENOUS at 09:25

## 2024-05-03 RX ADMIN — IPRATROPIUM BROMIDE AND ALBUTEROL SULFATE 3 ML: .5; 3 SOLUTION RESPIRATORY (INHALATION) at 07:52

## 2024-05-03 NOTE — INTERVAL H&P NOTE
Allergies   Allergen Reactions    Morphine Headache    Naloxone     Pentazocine Unknown - Low Severity    Tetanus Toxoids     Tyloxapol       H&P reviewed. The patient was examined and there are no changes to the H&P.

## 2024-05-03 NOTE — ANESTHESIA POSTPROCEDURE EVALUATION
Patient: Lamar Vee    Procedure Summary       Date: 05/03/24 Room / Location: [facility] 3 / [facility]    Anesthesia Start: 0919 Anesthesia Stop: 0939    Procedure: BRONCHOSCOPY WITH BRONCHIOALVEOLAR LAVAGE (Bilateral: Bronchus) Diagnosis:       Chest congestion      Severe persistent asthma, unspecified whether complicated      Mycobacterium avium complex      (Chest congestion [R09.89])      (Severe persistent asthma, unspecified whether complicated [J45.50])      (Mycobacterium avium complex [A31.0])    Surgeons: Victor M Field DO Provider: Kelly Charles CRNA    Anesthesia Type: general ASA Status: 3            Anesthesia Type: general    Vitals  Vitals Value Taken Time   /56 05/03/24 0953   Temp 36.7 °C (98 °F) 05/03/24 0939   Pulse 77 05/03/24 0956   Resp 18 05/03/24 0953   SpO2 95 % 05/03/24 0956   Vitals shown include unfiled device data.        Post Anesthesia Care and Evaluation    Post-procedure mental status: acceptable.  Pain management: satisfactory to patient    Airway patency: patent  Anesthetic complications: No anesthetic complications    Cardiovascular status: acceptable  Respiratory status: acceptable    Comments: Per chart review

## 2024-05-03 NOTE — OP NOTE
Procedure name: bronchoscopy with bronchoalveolar lavage     Indication: Mycobacterium avium complex     Sedation-IV MAC per anesthesia service     Procedure details:  Patient was brought back to the bronchoscopy suite, a bite block was placed in the oral cavity, and a therapeutic bronchoscope was then used to intubate the trachea. The vocal cords inspected and appeared to have normal motion with inhalation and ventilation.  Inspection was performed of the left tracheobronchial tree and there was no endobronchial lesion seen to the segmental level.  Inspection of the right tracheobronchial tree showed no endobronchial lesions to the segmental level.  A bronchoalveolar lavage was obtained from right middle lobe bronchus        Estimated blood loss:  none     Postoperative diagnosis: Anterior tracheal malacia, MAC infection    Patient tolerated procedure well, no complications        Plan  Patient is to follow up in my office as scheduled to go over the results of the pathology/cytology and microbiology    Electronically signed by Victor M Field DO, 05/03/24, 9:32 AM EDT.

## 2024-05-05 LAB
BACTERIA SPEC AEROBE CULT: NORMAL
GRAM STN SPEC: NORMAL
GRAM STN SPEC: NORMAL

## 2024-05-07 LAB
CYTO UR: NORMAL
LAB AP CASE REPORT: NORMAL
LAB AP CLINICAL INFORMATION: NORMAL
PATH REPORT.FINAL DX SPEC: NORMAL
PATH REPORT.GROSS SPEC: NORMAL

## 2024-05-08 LAB
FUNGUS WND CULT: ABNORMAL
IGE SERPL-ACNC: 40 IU/ML (ref 6–495)

## 2024-05-10 LAB
MYCOBACTERIUM SPEC CULT: NORMAL
NIGHT BLUE STAIN TISS: NORMAL
NIGHT BLUE STAIN TISS: NORMAL

## 2024-05-17 LAB
MYCOBACTERIUM SPEC CULT: NORMAL
NIGHT BLUE STAIN TISS: NORMAL
NIGHT BLUE STAIN TISS: NORMAL

## 2024-05-20 DIAGNOSIS — A31.0 MYCOBACTERIUM AVIUM COMPLEX: ICD-10-CM

## 2024-05-21 RX ORDER — ETHAMBUTOL HYDROCHLORIDE 400 MG/1
1800 TABLET, FILM COATED ORAL 3 TIMES WEEKLY
Qty: 54 TABLET | Refills: 3 | Status: SHIPPED | OUTPATIENT
Start: 2024-05-22

## 2024-05-23 ENCOUNTER — OFFICE VISIT (OUTPATIENT)
Dept: PULMONOLOGY | Facility: CLINIC | Age: 54
End: 2024-05-23
Payer: COMMERCIAL

## 2024-05-23 VITALS
WEIGHT: 132 LBS | BODY MASS INDEX: 24.92 KG/M2 | HEIGHT: 61 IN | RESPIRATION RATE: 16 BRPM | SYSTOLIC BLOOD PRESSURE: 147 MMHG | TEMPERATURE: 97.6 F | DIASTOLIC BLOOD PRESSURE: 50 MMHG | HEART RATE: 80 BPM | OXYGEN SATURATION: 98 %

## 2024-05-23 DIAGNOSIS — D86.0 PULMONARY SARCOIDOSIS: ICD-10-CM

## 2024-05-23 DIAGNOSIS — A31.0 MYCOBACTERIUM AVIUM COMPLEX: ICD-10-CM

## 2024-05-23 DIAGNOSIS — J82.83 EOSINOPHILIC ASTHMA: ICD-10-CM

## 2024-05-23 DIAGNOSIS — J45.50 SEVERE PERSISTENT ASTHMA WITHOUT COMPLICATION: Primary | ICD-10-CM

## 2024-05-23 DIAGNOSIS — R06.09 DYSPNEA ON EXERTION: ICD-10-CM

## 2024-05-23 NOTE — PROGRESS NOTES
Primary Care Provider  Everett Tapia MD     Referring Provider  No ref. provider found     Chief Complaint  Asthma and Follow-up (6 month f/up - post bronch. )    Subjective          Lamar Vee presents to [facility]  History of Present Illness  Lamar Vee is a 53 y.o. female patient of Dr. Field here for management of MAC infection, pulmonary sarcoidosis, asthma and chronic cough.    Patient recently had a bronchoscopy with BAL by Dr. Field on 5/3/2024.  All cultures are negative to date.  Patient's fungus culture is showing a mouth contaminant of Candida albicans.  No malignant cells were seen.  These findings were discussed with patient today in office.  I have educated her that the AFB culture will take up to 6 weeks to result.  During this time she is to continue taking her ethambutol, azithromycin and rifampin. She is also taking Symbicort and Spiriva as prescribed. She is intermittently using her albuterol as needed.  She has required several rounds of steroids recently.  She was previously on Fasenra and has been off his medication for quite some time.  After speaking with our nurse navigator, patient is still approved for the medication and is to contact the company.  I will have our nurse navigator speak with the patient as well.  Otherwise, patient states that she tolerated the procedure well.  She states her breathing is at baseline.  She does still notice intermittent wheezing.  She is able to perform her ADLs without difficulty.  I am a needing her to repeat blood work in approximately 1 month and reschedule her methacholine challenge.  She is up-to-date with her COVID, flu and pneumonia vaccines.     Her history of smoking is   Tobacco Use: Low Risk  (5/24/2024)    Patient History     Smoking Tobacco Use: Never     Smokeless Tobacco Use: Never     Passive Exposure: Never   .    Review of Systems   Constitutional:  Negative for  "chills, fatigue, fever, unexpected weight gain and unexpected weight loss.   HENT:  Congestion: Nasal.    Respiratory:  Positive for cough, shortness of breath and wheezing. Negative for apnea.         Negative for Hemoptysis     Cardiovascular:  Negative for chest pain, palpitations and leg swelling.   Skin:         Negative for cyanosis      Sleep: Negative for Excessive daytime sleepiness  Negative for morning headaches  Negative for Snoring    Family History   Problem Relation Age of Onset    Cancer Mother     Cancer Maternal Aunt     Heart disease Maternal Aunt     Malig Hyperthermia Neg Hx         Social History     Socioeconomic History    Marital status:    Tobacco Use    Smoking status: Never     Passive exposure: Never    Smokeless tobacco: Never   Vaping Use    Vaping status: Never Used   Substance and Sexual Activity    Alcohol use: Not Currently    Drug use: Not Currently     Comment: Pt states \"opiate addiction\".    Sexual activity: Defer        Past Medical History:   Diagnosis Date    Anxiety and depression     Asthma     Disease of thyroid gland     H/O: substance abuse     opiates    Hypertension     Melanoma 1992    Migraine     Neuropathy     Sarcoidosis     Seasonal allergies         Immunization History   Administered Date(s) Administered    COVID-19 (MODERNA) 1st,2nd,3rd Dose Monovalent 12/23/2020, 01/20/2021    Flu Vaccine Quad PF >36MO 11/19/2014, 11/12/2015, 09/13/2016, 09/19/2017    Flublok 18+yrs 11/01/2022    Fluzone (or Fluarix & Flulaval for VFC) >6mos 11/19/2014, 11/12/2015, 09/13/2016, 09/19/2017, 09/27/2023    Pneumococcal Conjugate 13-Valent (PCV13) 09/13/2016, 09/19/2017    Pneumococcal Conjugate 20-Valent (PCV20) 09/27/2023    Pneumococcal Polysaccharide (PPSV23) 11/19/2014         Allergies   Allergen Reactions    Morphine Headache    Naloxone     Pentazocine Unknown - Low Severity    Tetanus Toxoids     Tyloxapol           Current Outpatient Medications:     albuterol " sulfate  (90 Base) MCG/ACT inhaler, INHALE TWO PUFFS BY MOUTH EVERY 4 HOURS, Disp: 8.5 g, Rfl: 11    amitriptyline (ELAVIL) 100 MG tablet, Take 1 tablet by mouth every night at bedtime., Disp: , Rfl:     azithromycin (ZITHROMAX) 250 MG tablet, Take 2 tablets by mouth 3 (Three) Times a Week., Disp: 30 tablet, Rfl: 11    Benralizumab (Fasenra Pen) 30 MG/ML solution auto-injector, Inject 30 mg under the skin into the appropriate area as directed Every 2 (Two) Months., Disp: 1 mL, Rfl: 6    budesonide-formoterol (Symbicort) 160-4.5 MCG/ACT inhaler, Inhale 2 puffs 2 (Two) Times a Day., Disp: 3 each, Rfl: 3    buprenorphine (SUBUTEX) 8 MG sublingual tablet SL tablet, Dissolve THREE tablets UNDER THE TONGUE EVERY DAY, Disp: , Rfl: 0    clindamycin (CLEOCIN) 150 MG capsule, TAKE ONE CAPSULE BY MOUTH THREE TIMES DAILY UNTIL GONE, Disp: , Rfl:     clonazePAM (KlonoPIN) 1 MG tablet, Take 1 tablet by mouth 2 (Two) Times a Day., Disp: , Rfl: 0    ethambutol (MYAMBUTOL) 400 MG tablet, Take 4.5 tablets by mouth 3 (Three) Times a Week., Disp: 54 tablet, Rfl: 3    fluticasone (Flonase) 50 MCG/ACT nasal spray, 2 sprays into the nostril(s) as directed by provider Daily., Disp: 1 g, Rfl: 3    furosemide (LASIX) 20 MG tablet, Take 1 tablet by mouth Daily., Disp: , Rfl:     HYDROcodone-acetaminophen (NORCO) 5-325 MG per tablet, TAKE ONE TABLET BY MOUTH FOUR TIMES DAILY AS NEEDED FOR THREE DAYS, Disp: , Rfl:     Linzess 290 MCG capsule capsule, Take 1 capsule by mouth Daily., Disp: , Rfl:     ondansetron ODT (ZOFRAN-ODT) 8 MG disintegrating tablet, Place 1 tablet on the tongue Every 12 (Twelve) Hours As Needed for Nausea or Vomiting., Disp: , Rfl:     pantoprazole (PROTONIX) 40 MG EC tablet, TAKE ONE TABLET BY MOUTH DAILY, Disp: 30 tablet, Rfl: 3    rifAMPin (Rifadin) 300 MG capsule, Take 2 capsules by mouth 3 (Three) Times a Week., Disp: 24 capsule, Rfl: 2    Rimegepant Sulfate (Nurtec) 75 MG tablet dispersible tablet, Take 1  tablet by mouth As Needed (Migraine)., Disp: , Rfl:     Synthroid 25 MCG tablet, Take 1 tablet by mouth Daily., Disp: , Rfl:     tiZANidine (ZANAFLEX) 4 MG tablet, Take 2 tablets by mouth Every 8 (Eight) Hours As Needed for Muscle Spasms., Disp: , Rfl: 5    benzonatate (TESSALON) 200 MG capsule, Take 1 capsule by mouth 3 (Three) Times a Day As Needed. (Patient not taking: Reported on 5/16/2023), Disp: , Rfl:     cetirizine (zyrTEC) 10 MG tablet, Take 1 tablet by mouth Daily. (Patient not taking: Reported on 5/16/2023), Disp: , Rfl: 3    guaifenesin-dextromethorphan (MUCINEX DM)  MG tablet sustained-release 12 hour tablet, Take 2 tablets by mouth 2 (Two) Times a Day As Needed (congestion). (Patient not taking: Reported on 11/17/2023), Disp: 120 each, Rfl: 0    ibuprofen (ADVIL,MOTRIN) 600 MG tablet, Take 1 tablet by mouth Every 8 (Eight) Hours As Needed for Mild Pain. (Patient not taking: Reported on 2/16/2024), Disp: , Rfl: 1    levothyroxine (SYNTHROID, LEVOTHROID) 100 MCG tablet, Take 1 tablet by mouth Every Morning. (Patient not taking: Reported on 2/16/2024), Disp: , Rfl:     PROAIR RESPICLICK 108 (90 BASE) MCG/ACT inhaler, USE ONE PUFF FOUR TIMES DAILY (Patient not taking: Reported on 5/16/2023), Disp: , Rfl: 5    promethazine-dextromethorphan (PROMETHAZINE-DM) 6.25-15 MG/5ML syrup, 5 mL. (Patient not taking: Reported on 11/17/2023), Disp: , Rfl:     rOPINIRole (REQUIP) 2 MG tablet, TAKE ONE TABLET BY MOUTH AT BEDTIME (Patient not taking: Reported on 5/16/2023), Disp: , Rfl: 5    saccharomyces boulardii (Florastor) 250 MG capsule, Take 1 capsule by mouth 2 (Two) Times a Day. (Patient not taking: Reported on 2/16/2024), Disp: 30 capsule, Rfl: 11    tiotropium (SPIRIVA) 18 MCG per inhalation capsule, Place 1 capsule into inhaler and inhale Daily for 30 days., Disp: 30 capsule, Rfl: 0    tiotropium bromide monohydrate (Spiriva Respimat) 2.5 MCG/ACT aerosol solution inhaler, Inhale 2 puffs Daily. (Patient not  "taking: Reported on 2/16/2024), Disp: 2 each, Rfl: 0    tiotropium bromide monohydrate (Spiriva Respimat) 2.5 MCG/ACT aerosol solution inhaler, Inhale 2 puffs Daily. (Patient not taking: Reported on 2/16/2024), Disp: 1 each, Rfl: 11     Objective   Physical Exam  Constitutional:       General: She is not in acute distress.     Appearance: Normal appearance. She is normal weight.   HENT:      Right Ear: Hearing normal.      Left Ear: Hearing normal.      Nose: No nasal tenderness or congestion.      Mouth/Throat:      Mouth: Mucous membranes are moist. No oral lesions.   Eyes:      Extraocular Movements: Extraocular movements intact.      Pupils: Pupils are equal, round, and reactive to light.   Cardiovascular:      Rate and Rhythm: Normal rate and regular rhythm.      Pulses: Normal pulses.      Heart sounds: Normal heart sounds. No murmur heard.  Pulmonary:      Effort: Pulmonary effort is normal.      Breath sounds: Wheezing present. No rhonchi or rales.   Musculoskeletal:      Right lower leg: No edema.      Left lower leg: No edema.   Skin:     General: Skin is warm and dry.      Findings: No lesion or rash.   Neurological:      General: No focal deficit present.      Mental Status: She is alert and oriented to person, place, and time.   Psychiatric:         Mood and Affect: Affect normal. Mood is not anxious or depressed.         Vital Signs:   /50 (BP Location: Left arm, Patient Position: Sitting, Cuff Size: Adult)   Pulse 80   Temp 97.6 °F (36.4 °C) (Oral)   Resp 16   Ht 154.9 cm (61\")   Wt 59.9 kg (132 lb)   SpO2 98% Comment: RA  BMI 24.94 kg/m²        Result Review :   The following data was reviewed by: OSMAN Mcfadden on 05/23/2024:    CBC w/diff          4/30/2024    12:29   CBC w/Diff   WBC 6.75    RBC 4.39    Hemoglobin 12.7    Hematocrit 38.0    MCV 86.6    MCH 28.9    MCHC 33.4    RDW 12.5    Platelets 202    Neutrophil Rel % 78.0    Immature Granulocyte Rel % 0.3    Lymphocyte Rel " % 15.4    Monocyte Rel % 6.2    Eosinophil Rel % 0.0    Basophil Rel % 0.1    Personally reviewed IgE level 40 on 4/30/2024  Personally reviewed pneumonia panel, BAL culture, AFB culture, fungus culture and cytology from 5/3/2024    Data reviewed : Radiologic studies chest x-ray 4/30/2024, pulmonary function test 1/25/2019, Recent hospitalization notes Dr. Field's bronchoscopy operative note 5/3/2024, and my last office note    Procedures        Assessment and Plan    Diagnoses and all orders for this visit:    1. Severe persistent asthma without complication (Primary)  Comments:  Continue Symbicort and Spiriva  Orders:  -     CBC & Differential; Future    2. Mycobacterium avium complex  Comments:  Continue ethambutol, azithromycin and rifampin    3. Pulmonary sarcoidosis  Comments:  Stable    4. Eosinophilic asthma  Comments:  Restart Fasenra  Orders:  -     CBC & Differential; Future    5. Dyspnea on exertion  Comments:  Continue albuterol      I spent 32 minutes caring for Lamar on this date of service. This time includes time spent by me in the following activities:preparing for the visit, reviewing tests, obtaining and/or reviewing a separately obtained history, performing a medically appropriate examination and/or evaluation , counseling and educating the patient/family/caregiver, ordering medications, tests, or procedures, and documenting information in the medical record    Follow Up   Return in about 1 month (around 6/23/2024) for Recheck with Rebecca.  Patient was given instructions and counseling regarding her condition or for health maintenance advice. Please see specific information pulled into the AVS if appropriate.

## 2024-05-24 LAB
MYCOBACTERIUM SPEC CULT: NORMAL
NIGHT BLUE STAIN TISS: NORMAL
NIGHT BLUE STAIN TISS: NORMAL

## 2024-05-29 LAB — FUNGUS WND CULT: ABNORMAL

## 2024-05-31 LAB
MYCOBACTERIUM SPEC CULT: NORMAL
NIGHT BLUE STAIN TISS: NORMAL
NIGHT BLUE STAIN TISS: NORMAL

## 2024-06-07 LAB
MYCOBACTERIUM SPEC CULT: NORMAL
NIGHT BLUE STAIN TISS: NORMAL
NIGHT BLUE STAIN TISS: NORMAL

## 2024-06-14 LAB
MYCOBACTERIUM SPEC CULT: NORMAL
NIGHT BLUE STAIN TISS: NORMAL
NIGHT BLUE STAIN TISS: NORMAL

## 2024-06-27 ENCOUNTER — OFFICE VISIT (OUTPATIENT)
Dept: PULMONOLOGY | Facility: CLINIC | Age: 54
End: 2024-06-27
Payer: COMMERCIAL

## 2024-06-27 VITALS
WEIGHT: 133.6 LBS | HEIGHT: 61 IN | SYSTOLIC BLOOD PRESSURE: 120 MMHG | TEMPERATURE: 97.6 F | DIASTOLIC BLOOD PRESSURE: 60 MMHG | BODY MASS INDEX: 25.22 KG/M2 | OXYGEN SATURATION: 96 % | RESPIRATION RATE: 16 BRPM | HEART RATE: 57 BPM

## 2024-06-27 DIAGNOSIS — R06.09 DYSPNEA ON EXERTION: ICD-10-CM

## 2024-06-27 DIAGNOSIS — J45.50 SEVERE PERSISTENT ASTHMA WITHOUT COMPLICATION: Primary | ICD-10-CM

## 2024-06-27 DIAGNOSIS — D86.0 PULMONARY SARCOIDOSIS: ICD-10-CM

## 2024-06-27 DIAGNOSIS — J82.83 EOSINOPHILIC ASTHMA: ICD-10-CM

## 2024-06-27 DIAGNOSIS — A31.0 MYCOBACTERIUM AVIUM COMPLEX: ICD-10-CM

## 2024-06-27 PROCEDURE — 99213 OFFICE O/P EST LOW 20 MIN: CPT | Performed by: NURSE PRACTITIONER

## 2024-06-27 RX ORDER — LAMOTRIGINE 200 MG/1
1 TABLET ORAL DAILY
COMMUNITY
Start: 2024-06-17

## 2024-06-27 RX ORDER — LEVOFLOXACIN 750 MG/1
1 TABLET, FILM COATED ORAL DAILY
COMMUNITY
Start: 2024-06-24

## 2024-06-27 NOTE — PROGRESS NOTES
Primary Care Provider  Everett Tapia MD     Referring Provider  No ref. provider found     Chief Complaint  Asthma and Follow-up (1 month f/up )    Subjective          Lamar Vee presents to [facility] PULMON[facility] CRIT[facility]ARE MEDICINE  History of Present Illness  Lamar Vee is a 53 y.o. female patient of Dr. Field here for management of MAC infection, pulmonary sarcoidosis, asthma and chronic cough.    Patient states that she is doing okay.  She denies any current fever or chills.  She is currently on a course of Levaquin by urgent care.  She has not been on any steroids.  She is also taking Symbicort and Spiriva as prescribed. She is intermittently using her albuterol as needed.  She has been continuing to take ethambutol, rifampin and azithromycin for previous MAC infection.  Patient's most recent bronchoscopy from 5/3/2024 did show no growth in her AFB culture.  I will speak with Dr. Field next week to see if she can discontinue her triple antibiotic therapy.  Patient is also been on Fasenra for 2 months and states that she is doing well for this medication.  She states that overall she is doing well and has no additional concerns at this time.  She is scheduled to have a methacholine challenge test in July 2024.  She is able to perform her ADLs without difficulty.  She is up-to-date with her COVID, flu and pneumonia vaccines.     Her history of smoking is   Tobacco Use: Low Risk  (6/27/2024)    Patient History     Smoking Tobacco Use: Never     Smokeless Tobacco Use: Never     Passive Exposure: Never   .    Review of Systems   Constitutional:  Negative for chills, fatigue, fever, unexpected weight gain and unexpected weight loss.   HENT:  Congestion: Nasal.    Respiratory:  Positive for shortness of breath. Negative for apnea, cough and wheezing.         Negative for Hemoptysis     Cardiovascular:  Negative for chest pain, palpitations and leg swelling.   Skin:          "Negative for cyanosis      Sleep: Negative for Excessive daytime sleepiness  Negative for morning headaches  Negative for Snoring    Family History   Problem Relation Age of Onset    Cancer Mother     Cancer Maternal Aunt     Heart disease Maternal Aunt     Malig Hyperthermia Neg Hx         Social History     Socioeconomic History    Marital status:    Tobacco Use    Smoking status: Never     Passive exposure: Never    Smokeless tobacco: Never   Vaping Use    Vaping status: Never Used   Substance and Sexual Activity    Alcohol use: Not Currently    Drug use: Not Currently     Comment: Pt states \"opiate addiction\".    Sexual activity: Defer        Past Medical History:   Diagnosis Date    Anxiety and depression     Asthma     Disease of thyroid gland     H/O: substance abuse     opiates    Hypertension     Melanoma 1992    Migraine     Neuropathy     Sarcoidosis     Seasonal allergies         Immunization History   Administered Date(s) Administered    COVID-19 (MODERNA) 1st,2nd,3rd Dose Monovalent 12/23/2020, 01/20/2021    Flu Vaccine Quad PF >36MO 11/19/2014, 11/12/2015, 09/13/2016, 09/19/2017    Flublok 18+yrs 11/01/2022    Fluzone (or Fluarix & Flulaval for VFC) >6mos 11/19/2014, 11/12/2015, 09/13/2016, 09/19/2017, 09/27/2023    Pneumococcal Conjugate 13-Valent (PCV13) 09/13/2016, 09/19/2017    Pneumococcal Conjugate 20-Valent (PCV20) 09/27/2023    Pneumococcal Polysaccharide (PPSV23) 11/19/2014         Allergies   Allergen Reactions    Morphine Headache    Naloxone     Pentazocine Unknown - Low Severity    Tetanus Toxoids     Tyloxapol           Current Outpatient Medications:     albuterol sulfate  (90 Base) MCG/ACT inhaler, INHALE TWO PUFFS BY MOUTH EVERY 4 HOURS, Disp: 8.5 g, Rfl: 11    amitriptyline (ELAVIL) 100 MG tablet, Take 1 tablet by mouth every night at bedtime., Disp: , Rfl:     azithromycin (ZITHROMAX) 250 MG tablet, Take 2 tablets by mouth 3 (Three) Times a Week., Disp: 30 tablet, " Rfl: 11    Benralizumab (Fasenra Pen) 30 MG/ML solution auto-injector, Inject 30 mg under the skin into the appropriate area as directed Every 2 (Two) Months., Disp: 1 mL, Rfl: 6    budesonide-formoterol (Symbicort) 160-4.5 MCG/ACT inhaler, Inhale 2 puffs 2 (Two) Times a Day., Disp: 3 each, Rfl: 3    buprenorphine (SUBUTEX) 8 MG sublingual tablet SL tablet, Dissolve THREE tablets UNDER THE TONGUE EVERY DAY, Disp: , Rfl: 0    clonazePAM (KlonoPIN) 1 MG tablet, Take 1 tablet by mouth 2 (Two) Times a Day., Disp: , Rfl: 0    ethambutol (MYAMBUTOL) 400 MG tablet, Take 4.5 tablets by mouth 3 (Three) Times a Week., Disp: 54 tablet, Rfl: 3    fluticasone (Flonase) 50 MCG/ACT nasal spray, 2 sprays into the nostril(s) as directed by provider Daily., Disp: 1 g, Rfl: 3    furosemide (LASIX) 20 MG tablet, Take 1 tablet by mouth Daily., Disp: , Rfl:     HYDROcodone-acetaminophen (NORCO) 5-325 MG per tablet, TAKE ONE TABLET BY MOUTH FOUR TIMES DAILY AS NEEDED FOR THREE DAYS, Disp: , Rfl:     lamoTRIgine (LaMICtal) 200 MG tablet, Take 1 tablet by mouth Daily., Disp: , Rfl:     levoFLOXacin (LEVAQUIN) 750 MG tablet, Take 1 tablet by mouth Daily., Disp: , Rfl:     Linzess 290 MCG capsule capsule, Take 1 capsule by mouth Daily., Disp: , Rfl:     ondansetron ODT (ZOFRAN-ODT) 8 MG disintegrating tablet, Place 1 tablet on the tongue Every 12 (Twelve) Hours As Needed for Nausea or Vomiting., Disp: , Rfl:     pantoprazole (PROTONIX) 40 MG EC tablet, TAKE ONE TABLET BY MOUTH DAILY, Disp: 30 tablet, Rfl: 3    rifAMPin (Rifadin) 300 MG capsule, Take 2 capsules by mouth 3 (Three) Times a Week., Disp: 24 capsule, Rfl: 2    Rimegepant Sulfate (Nurtec) 75 MG tablet dispersible tablet, Take 1 tablet by mouth As Needed (Migraine)., Disp: , Rfl:     Synthroid 25 MCG tablet, Take 1 tablet by mouth Daily., Disp: , Rfl:     tiZANidine (ZANAFLEX) 4 MG tablet, Take 2 tablets by mouth Every 8 (Eight) Hours As Needed for Muscle Spasms., Disp: , Rfl: 5     benzonatate (TESSALON) 200 MG capsule, Take 1 capsule by mouth 3 (Three) Times a Day As Needed. (Patient not taking: Reported on 5/16/2023), Disp: , Rfl:     cetirizine (zyrTEC) 10 MG tablet, Take 1 tablet by mouth Daily. (Patient not taking: Reported on 5/16/2023), Disp: , Rfl: 3    guaifenesin-dextromethorphan (MUCINEX DM)  MG tablet sustained-release 12 hour tablet, Take 2 tablets by mouth 2 (Two) Times a Day As Needed (congestion). (Patient not taking: Reported on 11/17/2023), Disp: 120 each, Rfl: 0    ibuprofen (ADVIL,MOTRIN) 600 MG tablet, Take 1 tablet by mouth Every 8 (Eight) Hours As Needed for Mild Pain. (Patient not taking: Reported on 2/16/2024), Disp: , Rfl: 1    levothyroxine (SYNTHROID, LEVOTHROID) 100 MCG tablet, Take 1 tablet by mouth Every Morning. (Patient not taking: Reported on 2/16/2024), Disp: , Rfl:     PROAIR RESPICLICK 108 (90 BASE) MCG/ACT inhaler, USE ONE PUFF FOUR TIMES DAILY (Patient not taking: Reported on 5/16/2023), Disp: , Rfl: 5    promethazine-dextromethorphan (PROMETHAZINE-DM) 6.25-15 MG/5ML syrup, 5 mL. (Patient not taking: Reported on 11/17/2023), Disp: , Rfl:     rOPINIRole (REQUIP) 2 MG tablet, TAKE ONE TABLET BY MOUTH AT BEDTIME (Patient not taking: Reported on 5/16/2023), Disp: , Rfl: 5    saccharomyces boulardii (Florastor) 250 MG capsule, Take 1 capsule by mouth 2 (Two) Times a Day. (Patient not taking: Reported on 2/16/2024), Disp: 30 capsule, Rfl: 11    tiotropium (SPIRIVA) 18 MCG per inhalation capsule, Place 1 capsule into inhaler and inhale Daily for 30 days., Disp: 30 capsule, Rfl: 0    tiotropium bromide monohydrate (Spiriva Respimat) 2.5 MCG/ACT aerosol solution inhaler, Inhale 2 puffs Daily. (Patient not taking: Reported on 2/16/2024), Disp: 2 each, Rfl: 0    tiotropium bromide monohydrate (Spiriva Respimat) 2.5 MCG/ACT aerosol solution inhaler, Inhale 2 puffs Daily. (Patient not taking: Reported on 2/16/2024), Disp: 1 each, Rfl: 11     Objective   Physical  "Exam  Constitutional:       General: She is not in acute distress.     Appearance: Normal appearance. She is normal weight.   HENT:      Right Ear: Hearing normal.      Left Ear: Hearing normal.      Nose: No nasal tenderness or congestion.      Mouth/Throat:      Mouth: Mucous membranes are moist. No oral lesions.   Eyes:      Extraocular Movements: Extraocular movements intact.      Pupils: Pupils are equal, round, and reactive to light.   Cardiovascular:      Rate and Rhythm: Normal rate and regular rhythm.      Pulses: Normal pulses.      Heart sounds: Normal heart sounds. No murmur heard.  Pulmonary:      Effort: Pulmonary effort is normal.      Breath sounds: Normal breath sounds. No wheezing, rhonchi or rales.   Musculoskeletal:      Right lower leg: No edema.      Left lower leg: No edema.   Skin:     General: Skin is warm and dry.      Findings: No lesion or rash.   Neurological:      General: No focal deficit present.      Mental Status: She is alert and oriented to person, place, and time.   Psychiatric:         Mood and Affect: Affect normal. Mood is not anxious or depressed.         Vital Signs:   /60 (BP Location: Right arm, Patient Position: Sitting, Cuff Size: Adult)   Pulse 57   Temp 97.6 °F (36.4 °C) (Oral)   Resp 16   Ht 154.9 cm (61\")   Wt 60.6 kg (133 lb 9.6 oz)   SpO2 96% Comment: RA  BMI 25.24 kg/m²        Result Review :   The following data was reviewed by: OSMAN Mcfadden on 06/27/2024:    CBC w/diff          4/30/2024    12:29   CBC w/Diff   WBC 6.75    RBC 4.39    Hemoglobin 12.7    Hematocrit 38.0    MCV 86.6    MCH 28.9    MCHC 33.4    RDW 12.5    Platelets 202    Neutrophil Rel % 78.0    Immature Granulocyte Rel % 0.3    Lymphocyte Rel % 15.4    Monocyte Rel % 6.2    Eosinophil Rel % 0.0    Basophil Rel % 0.1    Personally reviewed pneumonia panel, BAL culture, AFB culture, fungus culture and cytology from 5/3/2024    Data reviewed : Radiologic studies chest x-ray " 4/30/2024, pulmonary function test 1/25/2019 and my last office note    Procedures        Assessment and Plan    Diagnoses and all orders for this visit:    1. Severe persistent asthma without complication (Primary)  Comments:  Continue Symbicort and Spiriva.  Continue Fasenra    2. Eosinophilic asthma  Comments:  Continue Symbicort and Spiriva. Continue Fasenra    3. Pulmonary sarcoidosis  Comments:  Stable    4. Mycobacterium avium complex  Comments:  Most recent bronchoscopy negative.  Will speak with Dr. Field next week.    5. Dyspnea on exertion  Comments:  Continue albuterol or DuoNebs.          Follow Up   Return in about 4 months (around 10/27/2024) for Recheck with Tj.  Patient was given instructions and counseling regarding her condition or for health maintenance advice. Please see specific information pulled into the AVS if appropriate.

## 2024-07-15 RX ORDER — METHACHOLINE CHLORIDE 48 MG/3 ML
3 VIAL, NEBULIZER (ML) INHALATION
Status: ACTIVE | OUTPATIENT
Start: 2024-07-19 | End: 2024-07-19

## 2024-07-15 RX ORDER — ALBUTEROL SULFATE 2.5 MG/3ML
2.5 SOLUTION RESPIRATORY (INHALATION) ONCE
Status: COMPLETED | OUTPATIENT
Start: 2024-07-19 | End: 2024-07-19

## 2024-07-15 RX ORDER — METHACHOLINE CHLORIDE 3 MG/3 ML
3 VIAL, NEBULIZER (ML) INHALATION
Status: ACTIVE | OUTPATIENT
Start: 2024-07-19 | End: 2024-07-19

## 2024-07-15 RX ORDER — ALBUTEROL SULFATE 2.5 MG/3ML
2.5 SOLUTION RESPIRATORY (INHALATION) ONCE AS NEEDED
Status: COMPLETED | OUTPATIENT
Start: 2024-07-19 | End: 2024-07-19

## 2024-07-15 RX ORDER — METHACHOLINE CHLORIDE 0.75/3ML
3 VIAL, NEBULIZER (ML) INHALATION
Status: COMPLETED | OUTPATIENT
Start: 2024-07-19 | End: 2024-07-19

## 2024-07-15 RX ORDER — METHACHOLINE CHLORIDE 12 MG/3 ML
3 VIAL, NEBULIZER (ML) INHALATION
Status: ACTIVE | OUTPATIENT
Start: 2024-07-19 | End: 2024-07-19

## 2024-07-15 RX ORDER — METHACHOLINE CHLORIDE 0 MG/3 ML
3 VIAL, NEBULIZER (ML) INHALATION
Status: COMPLETED | OUTPATIENT
Start: 2024-07-19 | End: 2024-07-19

## 2024-07-15 RX ORDER — METHACHOLINE CHLORIDE 0.1875/3ML
3 VIAL, NEBULIZER (ML) INHALATION
Status: COMPLETED | OUTPATIENT
Start: 2024-07-19 | End: 2024-07-19

## 2024-07-15 RX ORDER — SODIUM CHLORIDE FOR INHALATION 0.9 %
3 VIAL, NEBULIZER (ML) INHALATION ONCE AS NEEDED
Status: DISCONTINUED | OUTPATIENT
Start: 2024-07-19 | End: 2024-07-20 | Stop reason: HOSPADM

## 2024-07-16 ENCOUNTER — TRANSCRIBE ORDERS (OUTPATIENT)
Dept: ADMINISTRATIVE | Facility: HOSPITAL | Age: 54
End: 2024-07-16
Payer: COMMERCIAL

## 2024-07-16 DIAGNOSIS — Z12.31 VISIT FOR SCREENING MAMMOGRAM: Primary | ICD-10-CM

## 2024-07-16 DIAGNOSIS — Z78.0 ASYMPTOMATIC MENOPAUSAL STATE: Primary | ICD-10-CM

## 2024-07-19 ENCOUNTER — HOSPITAL ENCOUNTER (OUTPATIENT)
Dept: RESPIRATORY THERAPY | Facility: HOSPITAL | Age: 54
Discharge: HOME | End: 2024-07-19
Payer: COMMERCIAL

## 2024-07-19 DIAGNOSIS — J45.50 SEVERE PERSISTENT ASTHMA, UNSPECIFIED WHETHER COMPLICATED: Chronic | ICD-10-CM

## 2024-07-19 PROCEDURE — 94070 EVALUATION OF WHEEZING: CPT

## 2024-07-19 RX ADMIN — Medication: at 08:10

## 2024-07-19 RX ADMIN — Medication 0.19 MG: at 08:19

## 2024-07-19 RX ADMIN — ALBUTEROL SULFATE 2.5 MG: 2.5 SOLUTION RESPIRATORY (INHALATION) at 08:51

## 2024-07-19 RX ADMIN — Medication 0.75 MG: at 08:26

## 2024-07-19 RX ADMIN — ALBUTEROL SULFATE 2.5 MG: 2.5 SOLUTION RESPIRATORY (INHALATION) at 08:34

## 2024-07-30 ENCOUNTER — TELEPHONE (OUTPATIENT)
Dept: GASTROENTEROLOGY | Facility: CLINIC | Age: 54
End: 2024-07-30
Payer: COMMERCIAL

## 2024-07-30 DIAGNOSIS — D86.0 PULMONARY SARCOIDOSIS: ICD-10-CM

## 2024-07-30 DIAGNOSIS — J45.50 SEVERE PERSISTENT ASTHMA, UNSPECIFIED WHETHER COMPLICATED: ICD-10-CM

## 2024-07-30 DIAGNOSIS — J82.83 EOSINOPHILIC ASTHMA: Primary | ICD-10-CM

## 2024-07-30 RX ORDER — BENRALIZUMAB 30 MG/ML
30 INJECTION, SOLUTION SUBCUTANEOUS
Qty: 1 ML | Refills: 5 | Status: SHIPPED | OUTPATIENT
Start: 2024-07-30

## 2024-07-30 NOTE — TELEPHONE ENCOUNTER
Attempted to shawanda patient for DA appt today at 11:00AM , patients phone rang till I got the vm.

## 2024-08-15 ENCOUNTER — PREP FOR SURGERY (OUTPATIENT)
Dept: OTHER | Facility: HOSPITAL | Age: 54
End: 2024-08-15
Payer: COMMERCIAL

## 2024-08-15 ENCOUNTER — CLINICAL SUPPORT (OUTPATIENT)
Dept: GASTROENTEROLOGY | Facility: CLINIC | Age: 54
End: 2024-08-15
Payer: COMMERCIAL

## 2024-08-15 DIAGNOSIS — K21.9 GASTROESOPHAGEAL REFLUX DISEASE, UNSPECIFIED WHETHER ESOPHAGITIS PRESENT: Primary | ICD-10-CM

## 2024-08-15 NOTE — PROGRESS NOTES
Lamar Vee  1970  53 y.o.    Reason for call: GERD    Prep prescribed: N/A  Prep instructions reviewed with patient and sent to patient via regular mail to the home address on file    Clearance needed? Yes --- Pulmonary/Dr. Field     Is the patient currently on any injectable medications for weight loss or diabetes? No      Family history of colon cancer? Yes  If yes, indicate relative:  Mother    The patient has been scheduled for: EGD  Procedure Date: 9/27/24  Family History   Problem Relation Age of Onset    Colon cancer Mother     Cancer Mother     Cancer Maternal Aunt     Heart disease Maternal Aunt     Malig Hyperthermia Neg Hx      Past Medical History:   Diagnosis Date    Anxiety and depression     Asthma     Disease of thyroid gland     H/O: substance abuse     opiates    Hypertension     Melanoma 1992    Migraine     Neuropathy     Sarcoidosis     Seasonal allergies      Allergies   Allergen Reactions    Morphine Headache    Naloxone     Pentazocine Unknown - Low Severity    Tetanus Toxoids     Tyloxapol      Past Surgical History:   Procedure Laterality Date    APPENDECTOMY      BACK SURGERY      lumbar    BRONCHOSCOPY N/A 1/20/2023    Procedure: BRONCHOSCOPY WITH BIOPSIES AND BAL;  Surgeon: Victor M Field DO;  Location: [facility] ENDOSCOPY;  Service: Pulmonary;  Laterality: N/A;  SARCOIDOSIS AND RECURRENT LUNG INFECTIONS    BRONCHOSCOPY N/A 5/2/2023    Procedure: BRONCHOSCOPY WITH BRONCHOALVEOLAR LAVAGE, BIOPSIES, BRUSHING, BRONCHIAL WASHING;  Surgeon: Fawad Christian MD;  Location: [facility] ENDOSCOPY;  Service: Pulmonary;  Laterality: N/A;  RECURRENT PNEUMONIA    BRONCHOSCOPY Bilateral 5/3/2024    Procedure: BRONCHOSCOPY WITH BRONCHIOALVEOLAR LAVAGE;  Surgeon: Victor M Field DO;  Location: [facility] ENDOSCOPY;  Service: Pulmonary;  Laterality: Bilateral;  MYCROBACTERIUM AVIUM    CHOLECYSTECTOMY      COLONOSCOPY      FACIAL COSMETIC SURGERY      HYSTERECTOMY      SHOULDER  "SURGERY      spur removal    TONSILLECTOMY AND ADENOIDECTOMY      WISDOM TOOTH EXTRACTION       Social History     Socioeconomic History    Marital status:    Tobacco Use    Smoking status: Never     Passive exposure: Never    Smokeless tobacco: Never   Vaping Use    Vaping status: Never Used   Substance and Sexual Activity    Alcohol use: Not Currently    Drug use: Not Currently     Comment: Pt states \"opiate addiction\".    Sexual activity: Defer       Current Outpatient Medications:     albuterol sulfate  (90 Base) MCG/ACT inhaler, INHALE TWO PUFFS BY MOUTH EVERY 4 HOURS, Disp: 8.5 g, Rfl: 11    amitriptyline (ELAVIL) 100 MG tablet, Take 1 tablet by mouth every night at bedtime., Disp: , Rfl:     Benralizumab (Fasenra Pen) 30 MG/ML solution auto-injector, Inject 1 mL under the skin into the appropriate area as directed Every 2 (Two) Months., Disp: 1 mL, Rfl: 5    budesonide-formoterol (Symbicort) 160-4.5 MCG/ACT inhaler, Inhale 2 puffs 2 (Two) Times a Day., Disp: 3 each, Rfl: 3    buprenorphine (SUBUTEX) 8 MG sublingual tablet SL tablet, Dissolve THREE tablets UNDER THE TONGUE EVERY DAY, Disp: , Rfl: 0    clonazePAM (KlonoPIN) 1 MG tablet, Take 1 tablet by mouth 2 (Two) Times a Day., Disp: , Rfl: 0    esomeprazole (nexIUM) 20 MG capsule, Take 1 capsule by mouth Every 12 (Twelve) Hours., Disp: , Rfl:     fluticasone (Flonase) 50 MCG/ACT nasal spray, 2 sprays into the nostril(s) as directed by provider Daily., Disp: 1 g, Rfl: 3    furosemide (LASIX) 20 MG tablet, Take 1 tablet by mouth Daily., Disp: , Rfl:     HYDROcodone-acetaminophen (NORCO) 5-325 MG per tablet, TAKE ONE TABLET BY MOUTH FOUR TIMES DAILY AS NEEDED FOR THREE DAYS, Disp: , Rfl:     ibuprofen (ADVIL,MOTRIN) 600 MG tablet, Take 1 tablet by mouth Every 8 (Eight) Hours As Needed for Mild Pain., Disp: , Rfl: 1    lamoTRIgine (LaMICtal) 200 MG tablet, Take 1 tablet by mouth Daily., Disp: , Rfl:     levoFLOXacin (LEVAQUIN) 750 MG tablet, Take " 1 tablet by mouth Daily., Disp: , Rfl:     Linzess 290 MCG capsule capsule, Take 1 capsule by mouth Daily., Disp: , Rfl:     ondansetron ODT (ZOFRAN-ODT) 8 MG disintegrating tablet, Place 1 tablet on the tongue Every 12 (Twelve) Hours As Needed for Nausea or Vomiting., Disp: , Rfl:     Rimegepant Sulfate (Nurtec) 75 MG tablet dispersible tablet, Take 1 tablet by mouth As Needed (Migraine)., Disp: , Rfl:     tiotropium bromide monohydrate (Spiriva Respimat) 2.5 MCG/ACT aerosol solution inhaler, Inhale 2 puffs Daily., Disp: 2 each, Rfl: 0    tiZANidine (ZANAFLEX) 4 MG tablet, Take 2 tablets by mouth Every 8 (Eight) Hours As Needed for Muscle Spasms., Disp: , Rfl: 5    azithromycin (ZITHROMAX) 250 MG tablet, Take 2 tablets by mouth 3 (Three) Times a Week. (Patient not taking: Reported on 8/15/2024), Disp: 30 tablet, Rfl: 11    benzonatate (TESSALON) 200 MG capsule, Take 1 capsule by mouth 3 (Three) Times a Day As Needed. (Patient not taking: Reported on 5/16/2023), Disp: , Rfl:     cetirizine (zyrTEC) 10 MG tablet, Take 1 tablet by mouth Daily. (Patient not taking: Reported on 5/16/2023), Disp: , Rfl: 3    ethambutol (MYAMBUTOL) 400 MG tablet, Take 4.5 tablets by mouth 3 (Three) Times a Week. (Patient not taking: Reported on 8/15/2024), Disp: 54 tablet, Rfl: 3    guaifenesin-dextromethorphan (MUCINEX DM)  MG tablet sustained-release 12 hour tablet, Take 2 tablets by mouth 2 (Two) Times a Day As Needed (congestion). (Patient not taking: Reported on 11/17/2023), Disp: 120 each, Rfl: 0    levothyroxine (SYNTHROID, LEVOTHROID) 100 MCG tablet, Take 1 tablet by mouth Every Morning. (Patient not taking: Reported on 2/16/2024), Disp: , Rfl:     pantoprazole (PROTONIX) 40 MG EC tablet, TAKE ONE TABLET BY MOUTH DAILY (Patient not taking: Reported on 8/15/2024), Disp: 30 tablet, Rfl: 3    PROAIR RESPICLICK 108 (90 BASE) MCG/ACT inhaler, USE ONE PUFF FOUR TIMES DAILY (Patient not taking: Reported on 5/16/2023), Disp: , Rfl:  5    promethazine-dextromethorphan (PROMETHAZINE-DM) 6.25-15 MG/5ML syrup, 5 mL. (Patient not taking: Reported on 11/17/2023), Disp: , Rfl:     rifAMPin (Rifadin) 300 MG capsule, Take 2 capsules by mouth 3 (Three) Times a Week. (Patient not taking: Reported on 8/15/2024), Disp: 24 capsule, Rfl: 2    rOPINIRole (REQUIP) 2 MG tablet, TAKE ONE TABLET BY MOUTH AT BEDTIME (Patient not taking: Reported on 5/16/2023), Disp: , Rfl: 5    saccharomyces boulardii (Florastor) 250 MG capsule, Take 1 capsule by mouth 2 (Two) Times a Day. (Patient not taking: Reported on 2/16/2024), Disp: 30 capsule, Rfl: 11    Synthroid 25 MCG tablet, Take 1 tablet by mouth Daily. (Patient not taking: Reported on 8/15/2024), Disp: , Rfl:     tiotropium (SPIRIVA) 18 MCG per inhalation capsule, Place 1 capsule into inhaler and inhale Daily for 30 days., Disp: 30 capsule, Rfl: 0    tiotropium bromide monohydrate (Spiriva Respimat) 2.5 MCG/ACT aerosol solution inhaler, Inhale 2 puffs Daily. (Patient not taking: Reported on 2/16/2024), Disp: 1 each, Rfl: 11

## 2024-08-16 PROBLEM — K21.9 GASTROESOPHAGEAL REFLUX DISEASE: Status: ACTIVE | Noted: 2024-08-15

## 2024-08-20 ENCOUNTER — TELEPHONE (OUTPATIENT)
Dept: GASTROENTEROLOGY | Facility: CLINIC | Age: 54
End: 2024-08-20
Payer: COMMERCIAL

## 2024-08-20 NOTE — TELEPHONE ENCOUNTER
2024    Dear Dr. Field,      Patient Name: Lamar Vee  : 1970      This patient is waiting to have a Colonoscopy and/or Esophagogastroduodenoscopy which I will perform at [facility] on _.24_. Please respond to this request noting your recommendations regarding clearance from a pulmonary standpoint.  You may contact our office at [phone] (option 2) with any questions. I appreciate your prompt response in this matter. Please return this form to our office as soon as possible to [phone].    ____ I approve my patient from a pulmonary standpoint    ____ I do NOT approve my patient from a pulmonary standpoint at this time      Approving physician name (please print): _____________________________________________      Approving physician signature: ________________________________ Date:________________  Sincerely,  [facility] - Gastroenterology - [address]            Please fax approval or denial to our office as soon as possible.

## 2024-09-19 NOTE — PRE-PROCEDURE INSTRUCTIONS
"Instructed pt on date 09/27/24  and arrival time of   0930.  Instructed that arrival time is not procedure time but allows time to prepare for procedure.  Come to [facility] En[facility] \"[facility]\".  Must have a  over the age of 18 to drive home.  May have 2 visitors; however, children under the age of 12 must stay in the waiting room with an adult. Discussed diet Nothing by Mouth after midnight.  May take home medications as usual with a sip of water except for blood thinners, diuretics, diabetic medications, or weight loss medications.  No gum, mints, cough drops, dip, tobacco, chew, vape, or cigarettes 2 hours prior to procedure. Home medications must be taken at least 2 hours prior to procedure except those mentioned above. Pt verbalized understanding of instructions given. Call for any questions or concerns [phone] or [phone].    "

## 2024-09-19 NOTE — PRE-PROCEDURE INSTRUCTIONS
"PAT call attempted, no answer, left a detailed message. Instructed pt on date  09/27/2024 and arrival time of  0930 .  Instructed that arrival time is not procedure time but allows time to prepare for procedure.  Come to [facility] Entr[facility] \"C\".  Must have a  over the age of 18 to drive home.  May have 2 visitors; however, children under the age of 12 must stay in the waiting room with an adult. Discussed diet Nothing by Mouth after midnight.  May take home medications as usual with a sip of water except for blood thinners, diuretics, diabetic medications, or weight loss medications.  No gum, mints, cough drops, dip, tobacco, chew, vape, or cigarettes 2 hours prior to procedure. Home medications must be taken at least 2 hours prior to procedure except those mentioned above. Requested to return call to verify understanding of instructions given. Call for any questions or concerns [phone] or [phone].  Instructed to hold Ibuprofen morning of procedure.  "

## 2024-09-26 ENCOUNTER — ANESTHESIA EVENT (OUTPATIENT)
Dept: GASTROENTEROLOGY | Facility: HOSPITAL | Age: 54
End: 2024-09-26
Payer: COMMERCIAL

## 2024-09-27 ENCOUNTER — ANESTHESIA (OUTPATIENT)
Dept: GASTROENTEROLOGY | Facility: HOSPITAL | Age: 54
End: 2024-09-27
Payer: COMMERCIAL

## 2024-09-27 ENCOUNTER — HOSPITAL ENCOUNTER (OUTPATIENT)
Facility: HOSPITAL | Age: 54
Setting detail: HOSPITAL OUTPATIENT SURGERY
Discharge: HOME | End: 2024-09-27
Attending: INTERNAL MEDICINE | Admitting: INTERNAL MEDICINE
Payer: COMMERCIAL

## 2024-09-27 VITALS
HEIGHT: 61 IN | TEMPERATURE: 98.5 F | HEART RATE: 65 BPM | WEIGHT: 141.09 LBS | SYSTOLIC BLOOD PRESSURE: 121 MMHG | RESPIRATION RATE: 18 BRPM | DIASTOLIC BLOOD PRESSURE: 76 MMHG | BODY MASS INDEX: 26.64 KG/M2 | OXYGEN SATURATION: 100 %

## 2024-09-27 DIAGNOSIS — K21.9 GASTROESOPHAGEAL REFLUX DISEASE, UNSPECIFIED WHETHER ESOPHAGITIS PRESENT: ICD-10-CM

## 2024-09-27 PROCEDURE — 25810000003 LACTATED RINGERS PER 1000 ML

## 2024-09-27 PROCEDURE — 25010000002 MIDAZOLAM PER 1MG: Performed by: NURSE ANESTHETIST, CERTIFIED REGISTERED

## 2024-09-27 PROCEDURE — 25010000002 PROPOFOL 10 MG/ML EMULSION: Performed by: NURSE ANESTHETIST, CERTIFIED REGISTERED

## 2024-09-27 PROCEDURE — 88305 TISSUE EXAM BY PATHOLOGIST: CPT | Performed by: INTERNAL MEDICINE

## 2024-09-27 PROCEDURE — 25810000003 LACTATED RINGERS PER 1000 ML: Performed by: NURSE ANESTHETIST, CERTIFIED REGISTERED

## 2024-09-27 PROCEDURE — 43239 EGD BIOPSY SINGLE/MULTIPLE: CPT | Performed by: INTERNAL MEDICINE

## 2024-09-27 RX ORDER — LIDOCAINE HYDROCHLORIDE 20 MG/ML
INJECTION, SOLUTION EPIDURAL; INFILTRATION; INTRACAUDAL; PERINEURAL AS NEEDED
Status: DISCONTINUED | OUTPATIENT
Start: 2024-09-27 | End: 2024-09-27 | Stop reason: SURG

## 2024-09-27 RX ORDER — PROPOFOL 10 MG/ML
VIAL (ML) INTRAVENOUS AS NEEDED
Status: DISCONTINUED | OUTPATIENT
Start: 2024-09-27 | End: 2024-09-27 | Stop reason: SURG

## 2024-09-27 RX ORDER — SODIUM CHLORIDE, SODIUM LACTATE, POTASSIUM CHLORIDE, CALCIUM CHLORIDE 600; 310; 30; 20 MG/100ML; MG/100ML; MG/100ML; MG/100ML
30 INJECTION, SOLUTION INTRAVENOUS CONTINUOUS
Status: DISCONTINUED | OUTPATIENT
Start: 2024-09-27 | End: 2024-09-27 | Stop reason: HOSPADM

## 2024-09-27 RX ORDER — MIDAZOLAM HYDROCHLORIDE 2 MG/2ML
INJECTION, SOLUTION INTRAMUSCULAR; INTRAVENOUS AS NEEDED
Status: DISCONTINUED | OUTPATIENT
Start: 2024-09-27 | End: 2024-09-27 | Stop reason: SURG

## 2024-09-27 RX ORDER — SODIUM CHLORIDE, SODIUM LACTATE, POTASSIUM CHLORIDE, CALCIUM CHLORIDE 600; 310; 30; 20 MG/100ML; MG/100ML; MG/100ML; MG/100ML
INJECTION, SOLUTION INTRAVENOUS CONTINUOUS PRN
Status: DISCONTINUED | OUTPATIENT
Start: 2024-09-27 | End: 2024-09-27 | Stop reason: SURG

## 2024-09-27 RX ADMIN — PROPOFOL 50 MG: 10 INJECTION, EMULSION INTRAVENOUS at 11:01

## 2024-09-27 RX ADMIN — PROPOFOL 50 MG: 10 INJECTION, EMULSION INTRAVENOUS at 10:59

## 2024-09-27 RX ADMIN — MIDAZOLAM HYDROCHLORIDE 2 MG: 1 INJECTION, SOLUTION INTRAMUSCULAR; INTRAVENOUS at 10:53

## 2024-09-27 RX ADMIN — PROPOFOL 250 MCG/KG/MIN: 10 INJECTION, EMULSION INTRAVENOUS at 10:59

## 2024-09-27 RX ADMIN — LIDOCAINE HYDROCHLORIDE 100 MG: 20 INJECTION, SOLUTION INTRAVENOUS at 10:59

## 2024-09-27 RX ADMIN — SODIUM CHLORIDE, POTASSIUM CHLORIDE, SODIUM LACTATE AND CALCIUM CHLORIDE 30 ML/HR: 600; 310; 30; 20 INJECTION, SOLUTION INTRAVENOUS at 10:30

## 2024-09-27 RX ADMIN — SODIUM CHLORIDE, POTASSIUM CHLORIDE, SODIUM LACTATE AND CALCIUM CHLORIDE: 600; 310; 30; 20 INJECTION, SOLUTION INTRAVENOUS at 10:56

## 2024-09-27 NOTE — H&P
Pre Procedure History & Physical    Chief Complaint:   GERD persistent heartburn    Subjective     HPI:   Persistent heartburn    Past Medical History:   Past Medical History:   Diagnosis Date    Anxiety and depression     Asthma     Disease of thyroid gland     H/O: substance abuse     opiates    Hypertension     Melanoma 1992    Migraine     Neuropathy     Sarcoidosis     Seasonal allergies        Past Surgical History:  Past Surgical History:   Procedure Laterality Date    APPENDECTOMY      BACK SURGERY      lumbar    BRONCHOSCOPY N/A 1/20/2023    Procedure: BRONCHOSCOPY WITH BIOPSIES AND BAL;  Surgeon: Victor M Field DO;  Location: [facility] ENDOSCOPY;  Service: Pulmonary;  Laterality: N/A;  SARCOIDOSIS AND RECURRENT LUNG INFECTIONS    BRONCHOSCOPY N/A 5/2/2023    Procedure: BRONCHOSCOPY WITH BRONCHOALVEOLAR LAVAGE, BIOPSIES, BRUSHING, BRONCHIAL WASHING;  Surgeon: Fawad Christian MD;  Location: [facility] ENDOSCOPY;  Service: Pulmonary;  Laterality: N/A;  RECURRENT PNEUMONIA    BRONCHOSCOPY Bilateral 5/3/2024    Procedure: BRONCHOSCOPY WITH BRONCHIOALVEOLAR LAVAGE;  Surgeon: Victor M Field DO;  Location: [facility] ENDOSCOPY;  Service: Pulmonary;  Laterality: Bilateral;  MYCROBACTERIUM AVIUM    CHOLECYSTECTOMY      COLONOSCOPY      FACIAL COSMETIC SURGERY      HYSTERECTOMY      SHOULDER SURGERY      spur removal    TONSILLECTOMY AND ADENOIDECTOMY      WISDOM TOOTH EXTRACTION         Family History:  Family History   Problem Relation Age of Onset    Colon cancer Mother     Cancer Mother     Cancer Maternal Aunt     Heart disease Maternal Aunt     Malig Hyperthermia Neg Hx        Social History:   reports that she has never smoked. She has never been exposed to tobacco smoke. She has never used smokeless tobacco. She reports that she does not currently use alcohol. She reports that she does not currently use drugs.    Medications:   Medications Prior to Admission   Medication Sig Dispense Refill Last  Dose    albuterol sulfate  (90 Base) MCG/ACT inhaler INHALE TWO PUFFS BY MOUTH EVERY 4 HOURS 8.5 g 11     amitriptyline (ELAVIL) 100 MG tablet Take 1 tablet by mouth every night at bedtime.       azithromycin (ZITHROMAX) 250 MG tablet Take 2 tablets by mouth 3 (Three) Times a Week. (Patient not taking: Reported on 8/15/2024) 30 tablet 11     Benralizumab (Fasenra Pen) 30 MG/ML solution auto-injector Inject 1 mL under the skin into the appropriate area as directed Every 2 (Two) Months. 1 mL 5     benzonatate (TESSALON) 200 MG capsule Take 1 capsule by mouth 3 (Three) Times a Day As Needed. (Patient not taking: Reported on 5/16/2023)       budesonide-formoterol (Symbicort) 160-4.5 MCG/ACT inhaler Inhale 2 puffs 2 (Two) Times a Day. 3 each 3     buprenorphine (SUBUTEX) 8 MG sublingual tablet SL tablet Dissolve THREE tablets UNDER THE TONGUE EVERY DAY  0     cetirizine (zyrTEC) 10 MG tablet Take 1 tablet by mouth Daily. (Patient not taking: Reported on 5/16/2023)  3     clonazePAM (KlonoPIN) 1 MG tablet Take 1 tablet by mouth 2 (Two) Times a Day.  0     esomeprazole (nexIUM) 20 MG capsule Take 1 capsule by mouth Every 12 (Twelve) Hours.       ethambutol (MYAMBUTOL) 400 MG tablet Take 4.5 tablets by mouth 3 (Three) Times a Week. (Patient not taking: Reported on 8/15/2024) 54 tablet 3     fluticasone (Flonase) 50 MCG/ACT nasal spray 2 sprays into the nostril(s) as directed by provider Daily. 1 g 3     furosemide (LASIX) 20 MG tablet Take 1 tablet by mouth Daily.       guaifenesin-dextromethorphan (MUCINEX DM)  MG tablet sustained-release 12 hour tablet Take 2 tablets by mouth 2 (Two) Times a Day As Needed (congestion). (Patient not taking: Reported on 11/17/2023) 120 each 0     HYDROcodone-acetaminophen (NORCO) 5-325 MG per tablet TAKE ONE TABLET BY MOUTH FOUR TIMES DAILY AS NEEDED FOR THREE DAYS       ibuprofen (ADVIL,MOTRIN) 600 MG tablet Take 1 tablet by mouth Every 8 (Eight) Hours As Needed for Mild Pain.   1     lamoTRIgine (LaMICtal) 200 MG tablet Take 1 tablet by mouth Daily.       levoFLOXacin (LEVAQUIN) 750 MG tablet Take 1 tablet by mouth Daily.       levothyroxine (SYNTHROID, LEVOTHROID) 100 MCG tablet Take 1 tablet by mouth Every Morning. (Patient not taking: Reported on 2/16/2024)       Linzess 290 MCG capsule capsule Take 1 capsule by mouth Daily.       ondansetron ODT (ZOFRAN-ODT) 8 MG disintegrating tablet Place 1 tablet on the tongue Every 12 (Twelve) Hours As Needed for Nausea or Vomiting.       pantoprazole (PROTONIX) 40 MG EC tablet TAKE ONE TABLET BY MOUTH DAILY (Patient not taking: Reported on 8/15/2024) 30 tablet 3     PROAIR RESPICLICK 108 (90 BASE) MCG/ACT inhaler USE ONE PUFF FOUR TIMES DAILY (Patient not taking: Reported on 5/16/2023)  5     promethazine-dextromethorphan (PROMETHAZINE-DM) 6.25-15 MG/5ML syrup 5 mL. (Patient not taking: Reported on 11/17/2023)       rifAMPin (Rifadin) 300 MG capsule Take 2 capsules by mouth 3 (Three) Times a Week. (Patient not taking: Reported on 8/15/2024) 24 capsule 2     Rimegepant Sulfate (Nurtec) 75 MG tablet dispersible tablet Take 1 tablet by mouth As Needed (Migraine).       rOPINIRole (REQUIP) 2 MG tablet TAKE ONE TABLET BY MOUTH AT BEDTIME (Patient not taking: Reported on 5/16/2023)  5     saccharomyces boulardii (Florastor) 250 MG capsule Take 1 capsule by mouth 2 (Two) Times a Day. (Patient not taking: Reported on 2/16/2024) 30 capsule 11     Synthroid 25 MCG tablet Take 1 tablet by mouth Daily. (Patient not taking: Reported on 8/15/2024)       tiotropium (SPIRIVA) 18 MCG per inhalation capsule Place 1 capsule into inhaler and inhale Daily for 30 days. 30 capsule 0     tiotropium bromide monohydrate (Spiriva Respimat) 2.5 MCG/ACT aerosol solution inhaler Inhale 2 puffs Daily. 2 each 0     tiotropium bromide monohydrate (Spiriva Respimat) 2.5 MCG/ACT aerosol solution inhaler Inhale 2 puffs Daily. (Patient not taking: Reported on 2/16/2024) 1 each 11      tiZANidine (ZANAFLEX) 4 MG tablet Take 2 tablets by mouth Every 8 (Eight) Hours As Needed for Muscle Spasms.  5        Allergies:  Morphine, Naloxone, Pentazocine, Tetanus toxoids, and Tyloxapol        Objective     Weight 64 kg (141 lb 1.5 oz).    Physical Exam   Constitutional: Pt is oriented to person, place, and time and well-developed, well-nourished, and in no distress.   Mouth/Throat: Oropharynx is clear and moist.   Neck: Normal range of motion.   Cardiovascular: Normal rate, regular rhythm and normal heart sounds.    Pulmonary/Chest: Effort normal and breath sounds normal.   Abdominal: Soft. Nontender  Skin: Skin is warm and dry.   Psychiatric: Mood, memory, affect and judgment normal.     Assessment & Plan     Diagnosis:  Patient heartburn    Anticipated Surgical Procedure:  EGD    The risks, benefits, and alternatives of this procedure have been discussed with the patient or the responsible party- the patient understands and agrees to proceed.

## 2024-10-01 NOTE — PROGRESS NOTES
EGD 9/27/2024: Small hiatal hernia, normal esophagus, bilious fluid was found in the stomach body-stomach biopsy was negative, medium amount of food in the stomach body, normal duodenum-Dr. Ritchie recommends gastric emptying scan in 2 weeks    May need to schedule patient a follow-up if having continued symptoms

## 2024-10-07 ENCOUNTER — TELEPHONE (OUTPATIENT)
Dept: GASTROENTEROLOGY | Facility: CLINIC | Age: 54
End: 2024-10-07
Payer: COMMERCIAL

## 2024-10-07 NOTE — TELEPHONE ENCOUNTER
----- Message from Marilou Sosa sent at 10/1/2024  3:51 PM EDT -----  EGD 9/27/2024: Small hiatal hernia, normal esophagus, bilious fluid was found in the stomach body-stomach biopsy was negative, medium amount of food in the stomach body, normal duodenum-Dr. Ritchie recommends gastric emptying scan in 2 weeks    May need to schedule patient a follow-up if having continued symptoms

## 2024-10-07 NOTE — TELEPHONE ENCOUNTER
Attempted to contact patient, left voicemail message to return call. Provided direct contact information.    GES is scheduled for 10.11.24 at 0730.

## 2024-10-08 PROBLEM — Z80.0 FAMILY HISTORY OF COLON CANCER IN MOTHER: Status: ACTIVE | Noted: 2024-10-08

## 2024-10-08 NOTE — TELEPHONE ENCOUNTER
Patient returned call.    Spoke to patient and informed of OSMAN Hood result note and recommendations.  Verified patient understanding.     Educated on Gastric Emptying Study and reviewed prep instructions.  Patient verbalized understanding.     Patient denies need for office visit at this time, but agreeable to call if needed.    Patient reports mother had colon cancer, still living, resolved with colectomy.  Patient is asking if there is a need for her to have a colonoscopy.  Last colonoscopy performed by Dr. Guillermo in 2021 - procedure note in chart.  If patient needs repeat soon, she requests this be done by end of year.

## 2024-10-08 NOTE — TELEPHONE ENCOUNTER
Yes she can have a colonoscopy this year for high risk screening/family history of colon cancer.  Please arrange via a direct access phone call

## 2024-10-10 NOTE — TELEPHONE ENCOUNTER
Attempted to call patient to schedule first available DA/phone visit appointment for new patient. 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

## 2024-10-11 ENCOUNTER — HOSPITAL ENCOUNTER (OUTPATIENT)
Dept: NUCLEAR MEDICINE | Facility: HOSPITAL | Age: 54
Discharge: HOME | End: 2024-10-11
Payer: COMMERCIAL

## 2024-10-11 DIAGNOSIS — K21.9 GASTROESOPHAGEAL REFLUX DISEASE, UNSPECIFIED WHETHER ESOPHAGITIS PRESENT: ICD-10-CM

## 2024-10-11 PROCEDURE — 0 TECHNETIUM SULFUR COLLOID: Performed by: INTERNAL MEDICINE

## 2024-10-11 PROCEDURE — A9541 TC99M SULFUR COLLOID: HCPCS | Performed by: INTERNAL MEDICINE

## 2024-10-11 PROCEDURE — 78264 GASTRIC EMPTYING IMG STUDY: CPT

## 2024-10-11 RX ADMIN — TECHNETIUM TC 99M SULFUR COLLOID 1 DOSE: KIT at 07:31

## 2024-10-17 ENCOUNTER — OFFICE VISIT (OUTPATIENT)
Dept: PULMONOLOGY | Facility: CLINIC | Age: 54
End: 2024-10-17
Payer: COMMERCIAL

## 2024-10-17 VITALS
WEIGHT: 143 LBS | HEIGHT: 61 IN | TEMPERATURE: 97.6 F | SYSTOLIC BLOOD PRESSURE: 112 MMHG | DIASTOLIC BLOOD PRESSURE: 84 MMHG | HEART RATE: 72 BPM | RESPIRATION RATE: 16 BRPM | OXYGEN SATURATION: 95 % | BODY MASS INDEX: 27 KG/M2

## 2024-10-17 DIAGNOSIS — R06.09 DYSPNEA ON EXERTION: ICD-10-CM

## 2024-10-17 DIAGNOSIS — J82.83 EOSINOPHILIC ASTHMA: Primary | Chronic | ICD-10-CM

## 2024-10-17 DIAGNOSIS — D86.0 PULMONARY SARCOIDOSIS: ICD-10-CM

## 2024-10-17 DIAGNOSIS — J45.50 SEVERE PERSISTENT ASTHMA WITHOUT COMPLICATION: Chronic | ICD-10-CM

## 2024-10-17 DIAGNOSIS — Z23 ENCOUNTER FOR IMMUNIZATION: ICD-10-CM

## 2024-10-17 RX ORDER — ESOMEPRAZOLE MAGNESIUM 40 MG/1
1 CAPSULE, DELAYED RELEASE ORAL EVERY 12 HOURS SCHEDULED
COMMUNITY
Start: 2024-09-23

## 2024-10-17 RX ORDER — PHENAZOPYRIDINE 100 MG/1
1 TABLET, FILM COATED ORAL
COMMUNITY
Start: 2024-09-23

## 2024-10-17 RX ORDER — PREDNISONE 20 MG/1
40 TABLET ORAL DAILY
Qty: 14 TABLET | Refills: 0 | Status: SHIPPED | OUTPATIENT
Start: 2024-10-17 | End: 2024-10-24

## 2024-10-17 RX ORDER — LEVOFLOXACIN 750 MG/1
750 TABLET, FILM COATED ORAL DAILY
Qty: 7 TABLET | Refills: 0 | Status: SHIPPED | OUTPATIENT
Start: 2024-10-17

## 2024-10-17 RX ORDER — BUDESONIDE, GLYCOPYRROLATE, AND FORMOTEROL FUMARATE 160; 9; 4.8 UG/1; UG/1; UG/1
2 AEROSOL, METERED RESPIRATORY (INHALATION) 2 TIMES DAILY
Qty: 1 EACH | Refills: 4 | Status: SHIPPED | OUTPATIENT
Start: 2024-10-17

## 2024-10-17 RX ORDER — LEVOFLOXACIN 500 MG/1
1 TABLET, FILM COATED ORAL
COMMUNITY
Start: 2024-09-23 | End: 2024-10-17

## 2024-10-17 RX ORDER — PREDNISONE 20 MG/1
TABLET ORAL
COMMUNITY
Start: 2024-09-03 | End: 2024-10-17

## 2024-10-17 RX ORDER — BUDESONIDE AND FORMOTEROL FUMARATE DIHYDRATE 160; 4.5 UG/1; UG/1
2 AEROSOL RESPIRATORY (INHALATION)
Qty: 3 EACH | Refills: 3 | Status: SHIPPED | OUTPATIENT
Start: 2024-10-17

## 2024-10-17 NOTE — PROGRESS NOTES
Primary Care Provider  Everett Tapia MD     Referring Provider  No ref. provider found     Chief Complaint  Asthma, Shortness of Breath (Using rescue inhaler ), and Follow-up (4 month f/up )    Subjective          Lamar Vee presents to [facility] PULMONARY [facility]ICAL [facility]ARE MEDICINE  History of Present Illness  Lamar Vee is a 53 y.o. female patient of Dr. Field here for management of pulmonary sarcoidosis, severe persistent asthma and chronic cough.     Patient states that she is doing okay since her last office visit.  States that she did have a course of antibiotics and steroids recently.  She denies any current fever or chills. She is also taking Symbicort and Spiriva as prescribed.  She has been using her albuterol more lately but believes that is due to the weather she continues to receive Fasenra and is benefiting from its use.  Overall, she is doing well and has no other concerns today.  She is able to perform her ADLs without difficulty.  She would like to receive a flu vaccine today in office.     Her history of smoking is   Tobacco Use: Low Risk  (10/17/2024)    Patient History     Smoking Tobacco Use: Never     Smokeless Tobacco Use: Never     Passive Exposure: Never   .    Review of Systems   Constitutional:  Negative for chills, fatigue, fever, unexpected weight gain and unexpected weight loss.   HENT:  Congestion: Nasal.    Respiratory:  Positive for shortness of breath. Negative for apnea, cough and wheezing.         Negative for Hemoptysis     Cardiovascular:  Negative for chest pain, palpitations and leg swelling.   Skin:         Negative for cyanosis      Sleep: Negative for Excessive daytime sleepiness  Negative for morning headaches  Negative for Snoring    Family History   Problem Relation Age of Onset    Colon cancer Mother     Cancer Mother     Cancer Maternal Aunt     Heart disease Maternal Aunt     Malig Hyperthermia Neg Hx         Social History  "    Socioeconomic History    Marital status:    Tobacco Use    Smoking status: Never     Passive exposure: Never    Smokeless tobacco: Never   Vaping Use    Vaping status: Never Used   Substance and Sexual Activity    Alcohol use: Not Currently    Drug use: Not Currently     Comment: Pt states \"opiate addiction\".    Sexual activity: Defer        Past Medical History:   Diagnosis Date    Anxiety and depression     Asthma     Disease of thyroid gland     Family history of colon cancer in mother 10/08/2024    H/O: substance abuse     opiates    Hypertension     Melanoma 1992    Migraine     Neuropathy     Sarcoidosis     Seasonal allergies         Immunization History   Administered Date(s) Administered    COVID-19 (MODERNA) 1st,2nd,3rd Dose Monovalent 12/23/2020, 01/20/2021    Flu Vaccine Quad PF >36MO 11/19/2014, 11/12/2015, 09/13/2016, 09/19/2017    Flublok 18+yrs 11/01/2022    Fluzone  >6mos 10/17/2024    Fluzone (or Fluarix & Flulaval for VFC) >6mos 11/19/2014, 11/12/2015, 09/13/2016, 09/19/2017, 09/27/2023    Pneumococcal Conjugate 13-Valent (PCV13) 09/13/2016, 09/19/2017    Pneumococcal Conjugate 20-Valent (PCV20) 09/27/2023    Pneumococcal Polysaccharide (PPSV23) 11/19/2014         Allergies   Allergen Reactions    Morphine Headache    Naloxone     Pentazocine Unknown - Low Severity    Tetanus Toxoids     Tyloxapol           Current Outpatient Medications:     albuterol sulfate  (90 Base) MCG/ACT inhaler, INHALE TWO PUFFS BY MOUTH EVERY 4 HOURS, Disp: 8.5 g, Rfl: 11    amitriptyline (ELAVIL) 100 MG tablet, Take 1 tablet by mouth every night at bedtime., Disp: , Rfl:     Benralizumab (Fasenra Pen) 30 MG/ML solution auto-injector, Inject 1 mL under the skin into the appropriate area as directed Every 2 (Two) Months., Disp: 1 mL, Rfl: 5    budesonide-formoterol (Symbicort) 160-4.5 MCG/ACT inhaler, Inhale 2 puffs 2 (Two) Times a Day., Disp: 3 each, Rfl: 3    buprenorphine (SUBUTEX) 8 MG sublingual " tablet SL tablet, Dissolve THREE tablets UNDER THE TONGUE EVERY DAY, Disp: , Rfl: 0    cetirizine (zyrTEC) 10 MG tablet, Take 1 tablet by mouth Daily., Disp: , Rfl: 3    clonazePAM (KlonoPIN) 1 MG tablet, Take 1 tablet by mouth 2 (Two) Times a Day., Disp: , Rfl: 0    esomeprazole (nexIUM) 40 MG capsule, Take 1 capsule by mouth Every 12 (Twelve) Hours., Disp: , Rfl:     fluticasone (Flonase) 50 MCG/ACT nasal spray, 2 sprays into the nostril(s) as directed by provider Daily., Disp: 1 g, Rfl: 3    furosemide (LASIX) 20 MG tablet, Take 1 tablet by mouth Daily., Disp: , Rfl:     lamoTRIgine (LaMICtal) 200 MG tablet, Take 1 tablet by mouth Daily., Disp: , Rfl:     levothyroxine (SYNTHROID, LEVOTHROID) 25 MCG tablet, Take 1 tablet by mouth Every Morning., Disp: , Rfl:     Linzess 290 MCG capsule capsule, Take 1 capsule by mouth Daily., Disp: , Rfl:     ondansetron ODT (ZOFRAN-ODT) 8 MG disintegrating tablet, Place 1 tablet on the tongue Every 12 (Twelve) Hours As Needed for Nausea or Vomiting., Disp: , Rfl:     Pyridium 100 MG tablet, Take 1 tablet by mouth., Disp: , Rfl:     Rimegepant Sulfate (Nurtec) 75 MG tablet dispersible tablet, Take 1 tablet by mouth As Needed (Migraine)., Disp: , Rfl:     tiZANidine (ZANAFLEX) 4 MG tablet, Take 2 tablets by mouth Every 8 (Eight) Hours As Needed for Muscle Spasms., Disp: , Rfl: 5    Budeson-Glycopyrrol-Formoterol (Breztri Aerosphere) 160-9-4.8 MCG/ACT aerosol inhaler, Inhale 2 puffs 2 (Two) Times a Day., Disp: 1 each, Rfl: 4    esomeprazole (nexIUM) 20 MG capsule, Take 1 capsule by mouth Every 12 (Twelve) Hours. (Patient not taking: Reported on 10/17/2024), Disp: , Rfl:     levoFLOXacin (Levaquin) 750 MG tablet, Take 1 tablet by mouth Daily., Disp: 7 tablet, Rfl: 0    predniSONE (DELTASONE) 20 MG tablet, Take 2 tablets by mouth Daily for 7 days., Disp: 14 tablet, Rfl: 0    tiotropium (SPIRIVA) 18 MCG per inhalation capsule, Place 1 capsule into inhaler and inhale Daily for 30  "days., Disp: 30 capsule, Rfl: 0    tiotropium bromide monohydrate (Spiriva Respimat) 2.5 MCG/ACT aerosol solution inhaler, Inhale 2 puffs Daily. (Patient not taking: Reported on 10/17/2024), Disp: 1 each, Rfl: 11     Objective   Physical Exam  Constitutional:       General: She is not in acute distress.     Appearance: Normal appearance. She is normal weight.   HENT:      Right Ear: Hearing normal.      Left Ear: Hearing normal.      Nose: No nasal tenderness or congestion.      Mouth/Throat:      Mouth: Mucous membranes are moist. No oral lesions.   Eyes:      Extraocular Movements: Extraocular movements intact.      Pupils: Pupils are equal, round, and reactive to light.   Cardiovascular:      Rate and Rhythm: Normal rate and regular rhythm.      Pulses: Normal pulses.      Heart sounds: Normal heart sounds. No murmur heard.  Pulmonary:      Effort: Pulmonary effort is normal.      Breath sounds: Normal breath sounds. No wheezing, rhonchi or rales.   Musculoskeletal:      Right lower leg: No edema.      Left lower leg: No edema.   Skin:     General: Skin is warm and dry.      Findings: No lesion or rash.   Neurological:      General: No focal deficit present.      Mental Status: She is alert and oriented to person, place, and time.   Psychiatric:         Mood and Affect: Affect normal. Mood is not anxious or depressed.         Vital Signs:   /84 (BP Location: Right arm, Patient Position: Sitting, Cuff Size: Adult)   Pulse 72   Temp 97.6 °F (36.4 °C) (Oral)   Resp 16   Ht 154.9 cm (61\")   Wt 64.9 kg (143 lb)   SpO2 95% Comment: RA  BMI 27.02 kg/m²        Result Review :   The following data was reviewed by: OSMAN Mcfadden on 10/17/2024:    CBC w/diff          4/30/2024    12:29   CBC w/Diff   WBC 6.75    RBC 4.39    Hemoglobin 12.7    Hematocrit 38.0    MCV 86.6    MCH 28.9    MCHC 33.4    RDW 12.5    Platelets 202    Neutrophil Rel % 78.0    Immature Granulocyte Rel % 0.3    Lymphocyte Rel % " 15.4    Monocyte Rel % 6.2    Eosinophil Rel % 0.0    Basophil Rel % 0.1      Data reviewed : Radiologic studies chest x-ray 4/30/2024 methacholine challenge test 7/19/2024 and my last office note    Procedures        Assessment and Plan    Diagnoses and all orders for this visit:    1. Eosinophilic asthma (Primary)  Comments:  stop Symbicort and Spiriva.  Trial Breztri.  Continue Fasenra  Orders:  -     budesonide-formoterol (Symbicort) 160-4.5 MCG/ACT inhaler; Inhale 2 puffs 2 (Two) Times a Day.  Dispense: 3 each; Refill: 3  -     Budeson-Glycopyrrol-Formoterol (Breztri Aerosphere) 160-9-4.8 MCG/ACT aerosol inhaler; Inhale 2 puffs 2 (Two) Times a Day.  Dispense: 1 each; Refill: 4  -     predniSONE (DELTASONE) 20 MG tablet; Take 2 tablets by mouth Daily for 7 days.  Dispense: 14 tablet; Refill: 0  -     levoFLOXacin (Levaquin) 750 MG tablet; Take 1 tablet by mouth Daily.  Dispense: 7 tablet; Refill: 0    2. Severe persistent asthma without complication  Comments:  Stop Symbicort and Spiriva. Trial Breztri.  Continue Fasenra  Orders:  -     Budeson-Glycopyrrol-Formoterol (Breztri Aerosphere) 160-9-4.8 MCG/ACT aerosol inhaler; Inhale 2 puffs 2 (Two) Times a Day.  Dispense: 1 each; Refill: 4  -     predniSONE (DELTASONE) 20 MG tablet; Take 2 tablets by mouth Daily for 7 days.  Dispense: 14 tablet; Refill: 0  -     levoFLOXacin (Levaquin) 750 MG tablet; Take 1 tablet by mouth Daily.  Dispense: 7 tablet; Refill: 0    3. Pulmonary sarcoidosis  Comments:  Stable    4. Dyspnea on exertion  Comments:  Continue albuterol as needed    5. Encounter for immunization  -     Fluzone >6mos ([id number])    Needs colonoscopy.  Will be cleared from pulmonary with moderate risk due to severe asthma.          Follow Up   Return in about 4 months (around 2/17/2025) for Recheck with Tj.  Patient was given instructions and counseling regarding her condition or for health maintenance advice. Please see specific information  pulled into the AVS if appropriate.

## 2024-10-22 ENCOUNTER — OFFICE VISIT (OUTPATIENT)
Dept: GASTROENTEROLOGY | Facility: CLINIC | Age: 54
End: 2024-10-22
Payer: COMMERCIAL

## 2024-10-22 ENCOUNTER — TELEPHONE (OUTPATIENT)
Dept: GASTROENTEROLOGY | Facility: CLINIC | Age: 54
End: 2024-10-22

## 2024-10-22 ENCOUNTER — HOSPITAL ENCOUNTER (OUTPATIENT)
Dept: GENERAL RADIOLOGY | Facility: HOSPITAL | Age: 54
Discharge: HOME | End: 2024-10-22
Payer: COMMERCIAL

## 2024-10-22 ENCOUNTER — LAB (OUTPATIENT)
Dept: LAB | Facility: HOSPITAL | Age: 54
End: 2024-10-22
Payer: COMMERCIAL

## 2024-10-22 VITALS
HEART RATE: 75 BPM | HEIGHT: 61 IN | WEIGHT: 147.4 LBS | SYSTOLIC BLOOD PRESSURE: 127 MMHG | BODY MASS INDEX: 27.83 KG/M2 | DIASTOLIC BLOOD PRESSURE: 54 MMHG

## 2024-10-22 DIAGNOSIS — K59.04 CHRONIC IDIOPATHIC CONSTIPATION: Primary | ICD-10-CM

## 2024-10-22 DIAGNOSIS — J45.50 SEVERE PERSISTENT ASTHMA WITHOUT COMPLICATION: ICD-10-CM

## 2024-10-22 DIAGNOSIS — K31.84 GASTROPARESIS: ICD-10-CM

## 2024-10-22 DIAGNOSIS — R93.5 ABNORMAL X-RAY OF ABDOMEN: ICD-10-CM

## 2024-10-22 DIAGNOSIS — K59.04 CHRONIC IDIOPATHIC CONSTIPATION: ICD-10-CM

## 2024-10-22 DIAGNOSIS — R11.2 NAUSEA AND VOMITING, UNSPECIFIED VOMITING TYPE: Primary | ICD-10-CM

## 2024-10-22 DIAGNOSIS — J82.83 EOSINOPHILIC ASTHMA: ICD-10-CM

## 2024-10-22 DIAGNOSIS — R11.2 NAUSEA AND VOMITING, UNSPECIFIED VOMITING TYPE: ICD-10-CM

## 2024-10-22 DIAGNOSIS — Z12.11 ENCOUNTER FOR SCREENING FOR MALIGNANT NEOPLASM OF COLON: ICD-10-CM

## 2024-10-22 LAB
ALBUMIN SERPL-MCNC: 4 G/DL (ref 3.5–5.2)
ALBUMIN/GLOB SERPL: 1.4 G/DL
ALP SERPL-CCNC: 89 U/L (ref 39–117)
ALT SERPL W P-5'-P-CCNC: 11 U/L (ref 1–33)
ANION GAP SERPL CALCULATED.3IONS-SCNC: 8.2 MMOL/L (ref 5–15)
AST SERPL-CCNC: 15 U/L (ref 1–32)
BASOPHILS # BLD AUTO: 0 10*3/MM3 (ref 0–0.2)
BASOPHILS NFR BLD AUTO: 0 % (ref 0–1.5)
BILIRUB SERPL-MCNC: 0.3 MG/DL (ref 0–1.2)
BUN SERPL-MCNC: 9 MG/DL (ref 6–20)
BUN/CREAT SERPL: 9.7 (ref 7–25)
CALCIUM SPEC-SCNC: 9.2 MG/DL (ref 8.6–10.5)
CHLORIDE SERPL-SCNC: 102 MMOL/L (ref 98–107)
CO2 SERPL-SCNC: 30.8 MMOL/L (ref 22–29)
CREAT SERPL-MCNC: 0.93 MG/DL (ref 0.57–1)
DEPRECATED RDW RBC AUTO: 40.2 FL (ref 37–54)
EGFRCR SERPLBLD CKD-EPI 2021: 72.7 ML/MIN/1.73
EOSINOPHIL # BLD AUTO: 0 10*3/MM3 (ref 0–0.4)
EOSINOPHIL NFR BLD AUTO: 0 % (ref 0.3–6.2)
ERYTHROCYTE [DISTWIDTH] IN BLOOD BY AUTOMATED COUNT: 12.7 % (ref 12.3–15.4)
GLOBULIN UR ELPH-MCNC: 2.9 GM/DL
GLUCOSE SERPL-MCNC: 83 MG/DL (ref 65–99)
HCT VFR BLD AUTO: 38 % (ref 34–46.6)
HGB BLD-MCNC: 11.9 G/DL (ref 12–15.9)
IMM GRANULOCYTES # BLD AUTO: 0.02 10*3/MM3 (ref 0–0.05)
IMM GRANULOCYTES NFR BLD AUTO: 0.3 % (ref 0–0.5)
LYMPHOCYTES # BLD AUTO: 1.16 10*3/MM3 (ref 0.7–3.1)
LYMPHOCYTES NFR BLD AUTO: 17.8 % (ref 19.6–45.3)
MCH RBC QN AUTO: 27.4 PG (ref 26.6–33)
MCHC RBC AUTO-ENTMCNC: 31.3 G/DL (ref 31.5–35.7)
MCV RBC AUTO: 87.4 FL (ref 79–97)
MONOCYTES # BLD AUTO: 0.45 10*3/MM3 (ref 0.1–0.9)
MONOCYTES NFR BLD AUTO: 6.9 % (ref 5–12)
NEUTROPHILS NFR BLD AUTO: 4.9 10*3/MM3 (ref 1.7–7)
NEUTROPHILS NFR BLD AUTO: 75 % (ref 42.7–76)
NRBC BLD AUTO-RTO: 0 /100 WBC (ref 0–0.2)
PLATELET # BLD AUTO: 250 10*3/MM3 (ref 140–450)
PMV BLD AUTO: 10.2 FL (ref 6–12)
POTASSIUM SERPL-SCNC: 4 MMOL/L (ref 3.5–5.2)
PROT SERPL-MCNC: 6.9 G/DL (ref 6–8.5)
RBC # BLD AUTO: 4.35 10*6/MM3 (ref 3.77–5.28)
SODIUM SERPL-SCNC: 141 MMOL/L (ref 136–145)
WBC NRBC COR # BLD AUTO: 6.53 10*3/MM3 (ref 3.4–10.8)

## 2024-10-22 PROCEDURE — 85025 COMPLETE CBC W/AUTO DIFF WBC: CPT

## 2024-10-22 PROCEDURE — 36415 COLL VENOUS BLD VENIPUNCTURE: CPT | Performed by: NURSE PRACTITIONER

## 2024-10-22 PROCEDURE — 74018 RADEX ABDOMEN 1 VIEW: CPT

## 2024-10-22 PROCEDURE — 80053 COMPREHEN METABOLIC PANEL: CPT | Performed by: NURSE PRACTITIONER

## 2024-10-22 RX ORDER — METOCLOPRAMIDE 5 MG/1
TABLET ORAL
COMMUNITY
Start: 2024-10-17

## 2024-10-22 NOTE — TELEPHONE ENCOUNTER
10/22/2024    Dear Dr Field,      Patient Name: Lamar Vee  : 1970      This patient is waiting to have a Colonoscopy and/or Esophagogastroduodenoscopy which I will perform at [facility] on 24. Please respond to this request noting your recommendations regarding clearance from a Pulmonary  standpoint.  You may contact our office at [phone] Option 2 with any questions. I appreciate your prompt response in this matter. Please return this form to our office as soon as possible to [phone].    ____ I approve my patient from a Pulmonary  standpoint    ____ I do NOT approve my patient from a Pulmonary  standpoint at this time    Please inform our office if the patient requires additional follow-up from your office prior to scheduled procedure date.      Please specify clearance expiration date:____________________________________      Approving physician name (please print): _____________________________________________      Approving physician signature: ________________________________ Date:________________  Sincerely,  [facility] - Gastroenterology   Dr. Ritchie          Please fax approval or denial to our office as soon as possible.

## 2024-10-22 NOTE — PATIENT INSTRUCTIONS
Gastroparesis    Gastroparesis is a condition in which food takes longer than normal to empty from the stomach. This condition is also known as delayed gastric emptying. It is usually a long-term (chronic) condition.  There is no cure, but there are treatments and things that you can do at home to help relieve symptoms. Treating the underlying condition that causes gastroparesis can also help relieve symptoms.  What are the causes?  In many cases, the cause of this condition is not known. Possible causes include:  A hormone (endocrine) disorder, such as hypothyroidism or diabetes.  A nervous system disease, such as Parkinson's disease or multiple sclerosis.  Cancer, infection, or surgery that affects the stomach or vagus nerve. The vagus nerve runs from your chest, through your neck, and to the lower part of your brain.  A connective tissue disorder, such as scleroderma.  Certain medicines.  What increases the risk?  You are more likely to develop this condition if:  You have certain disorders or diseases. These may include:  An endocrine disorder.  An eating disorder.  Amyloidosis.  Scleroderma.  Parkinson's disease.  Multiple sclerosis.  Cancer or infection of the stomach or the vagus nerve.  You have had surgery on your stomach or vagus nerve.  You take certain medicines.  You are female.  What are the signs or symptoms?  Symptoms of this condition include:  Feeling full after eating very little or a loss of appetite.  Nausea, vomiting, or heartburn.  Bloating of your abdomen.  Inconsistent blood sugar (glucose) levels on blood tests.  Unexplained weight loss.  Acid from the stomach coming up into the esophagus (gastroesophageal reflux).  Sudden tightening (spasm) of the stomach, which can be painful.  Symptoms may come and go. Some people may not notice any symptoms.  How is this diagnosed?  This condition is diagnosed with tests, such as:  Tests that check how long it takes food to move through the stomach and  intestines. These tests include:  Upper gastrointestinal (GI) series. For this test, you drink a liquid that shows up well on X-rays, and then X-rays are taken of your intestines.  Gastric emptying scintigraphy. For this test, you eat food that contains a small amount of radioactive material, and then scans are taken.  Wireless capsule GI monitoring system. For this test, you swallow a pill (capsule) that records information about how foods and fluid move through your stomach.  Gastric manometry. For this test, a tube is passed down your throat and into your stomach to measure electrical and muscular activity.  Endoscopy. For this test, a long, thin tube with a camera and light on the end is passed down your throat and into your stomach to check for problems in your stomach lining.  Ultrasound. This test uses sound waves to create images of the inside of your body. This can help rule out gallbladder disease or pancreatitis as a cause of your symptoms.  How is this treated?  There is no cure for this condition, but treatment and home care may relieve symptoms. Treatment may include:  Treating the underlying cause.  Managing your symptoms by making changes to your diet and exercise habits.  Taking medicines to control nausea and vomiting and to stimulate stomach muscles.  Getting food through a feeding tube in the hospital. This may be done in severe cases.  Having surgery to insert a device called a gastric electrical stimulator into your body. This device helps improve stomach emptying and control nausea and vomiting.  Follow these instructions at home:  Take over-the-counter and prescription medicines only as told by your health care provider.  Follow instructions from your health care provider about eating or drinking restrictions. Your health care provider may recommend that you:  Eat smaller meals more often.  Eat low-fat foods.  Eat low-fiber forms of high-fiber foods. For example, eat cooked vegetables instead  of raw vegetables.  Have only liquid foods instead of solid foods. Liquid foods are easier to digest.  Drink enough fluid to keep your urine pale yellow.  Exercise as often as told by your health care provider.  Keep all follow-up visits. This is important.  Contact a health care provider if you:  Notice that your symptoms do not improve with treatment.  Have new symptoms.  Get help right away if you:  Have severe pain in your abdomen that does not improve with treatment.  Have nausea that is severe or does not go away.  Vomit every time you drink fluids.  Summary  Gastroparesis is a long-term (chronic) condition in which food takes longer than normal to empty from the stomach.  Symptoms include nausea, vomiting, heartburn, bloating of your abdomen, and loss of appetite.  Eating smaller portions, low-fat foods, and low-fiber forms of high-fiber foods may help you manage your symptoms.  Get help right away if you have severe pain in your abdomen.  This information is not intended to replace advice given to you by your health care provider. Make sure you discuss any questions you have with your health care provider.  Document Revised: 04/26/2021 Document Reviewed: 04/26/2021  [employer] Patient Education © 2024 [employer]     Denies personal or family hx of DVT or PE

## 2024-10-22 NOTE — PROGRESS NOTES
Patient Name: Lamar Vee   Visit Date: 10/22/2024   Patient ID: [id number]  Provider: OSMAN Hutson    Sex: female  Location:  Location Address:  Location Phone: [address] [address] [phone]    YOB: 1970  Age: 53 y.o.   Primary Care Provider Everett Tapia MD      Referring Provider: No ref. provider found        Chief Complaint  Abdominal Pain (Left side ABD pain daily ), Constipation (Pt states her last Bm was over 2 weeks ago, has tried fleet enema, taking Linzess, has passed a very small amount of liquid stool yesterday), Vomiting (With every meal ), Gas (Started yesterday ), and Nausea    History of Present Illness  Pt was set up for EGD through [facility] - pt states she had n/v and wasn't feeling well.   EGD 9/27/2024: Small hiatal hernia, normal esophagus, bilious fluid was found in the stomach body-stomach biopsy was negative, medium amount of food in the stomach body, normal duodenum   Gastric emptying scan 10/11/24:  18% remaining at 4 hours    Pt states she's had constipation chronically, has worsened recently. Has been on Linzess 290 for a couple years, wasn't needing daily until the last few weeks. Normally has BM 1-2 x week, states no BM x 2 weeks.  Pt states she has hx of obstruction 20 yrs ago, resolved w NG tube.  Having some generalized pain. Pt states she did a fleets enema yesterday, she had very small liquid stool result, and passed some gas yesterday but none today. Started vomiting with qmeal 2 days ago. Keeping liquids down. No nausea presently. Kept down 1/2 honey bun this morning. Some chills, no fever or shakes.   Pt has had several abdominal surgeries. Hx tubal, hx partial hysterectomy, 10 yrs later had ovaries removed. Hx lap ron, and appy done with hysterectomy.   Has been taking Reglan this week - given by PCP    History of colonoscopy in 2021 with Dr. Guillermo-Erythema in the distal descending colon, mid descending colon and mid  rectum with biopsies taken at the time-random colon biopsy with focal active colitis she was having diarrhea for 3 months-C. difficile was positive  Past Medical History:   Diagnosis Date    Anxiety and depression     Asthma     Disease of thyroid gland     Family history of colon cancer in mother 10/08/2024    H/O: substance abuse     opiates    Hypertension     Melanoma 1992    Migraine     Neuropathy     Sarcoidosis     Seasonal allergies        Past Surgical History:   Procedure Laterality Date    APPENDECTOMY      BACK SURGERY      lumbar    BRONCHOSCOPY N/A 1/20/2023    Procedure: BRONCHOSCOPY WITH BIOPSIES AND BAL;  Surgeon: Victor M Field DO;  Location: [facility] ENDOSCOPY;  Service: Pulmonary;  Laterality: N/A;  SARCOIDOSIS AND RECURRENT LUNG INFECTIONS    BRONCHOSCOPY N/A 5/2/2023    Procedure: BRONCHOSCOPY WITH BRONCHOALVEOLAR LAVAGE, BIOPSIES, BRUSHING, BRONCHIAL WASHING;  Surgeon: Fawad Christian MD;  Location: [facility] ENDOSCOPY;  Service: Pulmonary;  Laterality: N/A;  RECURRENT PNEUMONIA    BRONCHOSCOPY Bilateral 5/3/2024    Procedure: BRONCHOSCOPY WITH BRONCHIOALVEOLAR LAVAGE;  Surgeon: Victor M Field DO;  Location: [facility] ENDOSCOPY;  Service: Pulmonary;  Laterality: Bilateral;  MYCROBACTERIUM AVIUM    CHOLECYSTECTOMY      COLONOSCOPY      ENDOSCOPY N/A 9/27/2024    Procedure: ESOPHAGOGASTRODUODENOSCOPY with biopsy;  Surgeon: Gabe Ritchie MD;  Location: [facility] ENDOSCOPY;  Service: Gastroenterology;  Laterality: N/A;  hiatal hernia, retained food    FACIAL COSMETIC SURGERY      HYSTERECTOMY      SHOULDER SURGERY      spur removal    TONSILLECTOMY AND ADENOIDECTOMY      WISDOM TOOTH EXTRACTION         Allergies   Allergen Reactions    Morphine Headache    Naloxone     Pentazocine Unknown - Low Severity    Tetanus Toxoids     Tyloxapol        Family History   Problem Relation Age of Onset    Colon cancer Mother 50    Cancer Mother     Cancer Maternal Aunt     Heart disease  "Maternal Aunt     Malig Hyperthermia Neg Hx         Social History     Tobacco Use    Smoking status: Never     Passive exposure: Never    Smokeless tobacco: Never   Vaping Use    Vaping status: Never Used   Substance Use Topics    Alcohol use: Not Currently    Drug use: Not Currently     Comment: Pt states \"opiate addiction\".       Objective     Vital Signs:   /54 (BP Location: Left arm, Patient Position: Sitting, Cuff Size: Adult)   Pulse 75   Ht 154.9 cm (61\")   Wt 66.9 kg (147 lb 6.4 oz)   BMI 27.85 kg/m²       Physical Exam  Constitutional:       General: The patient is not in acute distress.     Appearance: Normal appearance.   HENT:      Head: Normocephalic and atraumatic.      Nose: Nose normal.   Pulmonary:      Effort: Pulmonary effort is normal. No respiratory distress.   Abdominal:      General: Abdomen is flat.      Palpations: Abdomen is soft. There is no mass. There are BS in all 4 quadrants     Tenderness: Pt has mild generalized tenderness. There is no guarding.   Musculoskeletal:      Cervical back: Neck supple.      Right lower leg: No edema.      Left lower leg: No edema.   Skin:     General: Skin is warm and dry.   Neurological:      General: No focal deficit present.      Mental Status: The patient is alert and oriented to person, place, and time.      Gait: Gait normal.   Psychiatric:         Mood and Affect: Mood normal.         Speech: Speech normal.         Behavior: Behavior normal.         Thought Content: Thought content normal.     Result Review :   The following data was reviewed by: OSMAN Hutson on 10/22/2024:                    Assessment and Plan    Diagnoses and all orders for this visit:    1. Chronic idiopathic constipation (Primary)  -     XR Abdomen KUB; Future  -     Cancel: CBC (No Diff); Future  -     Cancel: Comprehensive Metabolic Panel  -     CBC (No Diff); Future  -     Comprehensive Metabolic Panel  -     Case Request; Standing    2. Nausea " and vomiting, unspecified vomiting type    3. Gastroparesis    4. Encounter for screening for malignant neoplasm of colon  -     Case Request; Standing    Other orders  -     polyethylene glycol (GoLYTELY) 236 g solution; Take per office instructions  Dispense: 4000 mL; Refill: 0  -     Follow Anesthesia Guidelines / Protocol; Standing  -     Follow Anesthesia Guidelines / Protocol; Future  -     Verify NPO; Standing              Follow Up   Return for follow up after procedure.  Labs and Xray now, if no obstruction will give colon prep for severe constipation  Pt is due for Colonoscopy Surgical Risk and Benefits: Possible risks/complications, benefits, and alternatives to surgical or invasive procedure have been explained to patient and/or legal guardian; risks include bleeding, infection, and perforation. Patient has been evaluated and can tolerate anesthesia and/or sedation. Risks, benefits, and alternatives to anesthesia and sedation have been explained to patient and/or legal guardian.     Patient was given instructions and counseling regarding her condition or for health maintenance advice. Please see specific information pulled into the AVS if appropriate.

## 2024-10-28 ENCOUNTER — HOSPITAL ENCOUNTER (OUTPATIENT)
Dept: CT IMAGING | Facility: HOSPITAL | Age: 54
Discharge: HOME | End: 2024-10-28
Admitting: NURSE PRACTITIONER
Payer: COMMERCIAL

## 2024-10-28 DIAGNOSIS — R93.5 ABNORMAL X-RAY OF ABDOMEN: ICD-10-CM

## 2024-10-28 DIAGNOSIS — R11.2 NAUSEA AND VOMITING, UNSPECIFIED VOMITING TYPE: ICD-10-CM

## 2024-10-28 PROCEDURE — 74177 CT ABD & PELVIS W/CONTRAST: CPT

## 2024-10-28 PROCEDURE — 25510000001 IOPAMIDOL PER 1 ML: Performed by: NURSE PRACTITIONER

## 2024-10-28 RX ORDER — IOPAMIDOL 755 MG/ML
100 INJECTION, SOLUTION INTRAVASCULAR
Status: COMPLETED | OUTPATIENT
Start: 2024-10-28 | End: 2024-10-28

## 2024-10-28 RX ADMIN — IOPAMIDOL 85 ML: 755 INJECTION, SOLUTION INTRAVENOUS at 14:53

## 2024-10-29 NOTE — PROGRESS NOTES
Okay, did patient say how she is doing since I sent in bowel prep?  Did it help her constipation?  I would recommend starting something like Linzess to avoid constipation

## 2024-10-31 NOTE — PROGRESS NOTES
You may offer this to pt  Call after completing. We can start pt on medication for chronic constipation to better control

## 2024-10-31 NOTE — TELEPHONE ENCOUNTER
.[facility]  
Caller Name: Lamar Vee      Relationship: Self      Best Contact Number: [phone] (home)         Patient is requesting samples of : FASENRA       How many days of medication do you have left? NONE        Additional Information:PT NO LONGER CAN GET FREE FROM COMPANY, IS THERE ANOTHER WAY SHE CAN GET IT THAT IS AFFORDABLE? PLEASE CALL PT TO ADVISE         
Resident

## 2024-11-07 ENCOUNTER — PREP FOR SURGERY (OUTPATIENT)
Dept: OTHER | Facility: HOSPITAL | Age: 54
End: 2024-11-07
Payer: COMMERCIAL

## 2024-11-07 ENCOUNTER — TELEPHONE (OUTPATIENT)
Dept: SURGERY | Facility: CLINIC | Age: 54
End: 2024-11-07
Payer: COMMERCIAL

## 2024-11-07 ENCOUNTER — CLINICAL SUPPORT (OUTPATIENT)
Dept: GASTROENTEROLOGY | Facility: CLINIC | Age: 54
End: 2024-11-07
Payer: COMMERCIAL

## 2024-11-07 DIAGNOSIS — Z12.11 ENCOUNTER FOR SCREENING FOR MALIGNANT NEOPLASM OF COLON: Primary | ICD-10-CM

## 2024-11-07 DIAGNOSIS — K21.9 GASTROESOPHAGEAL REFLUX DISEASE, UNSPECIFIED WHETHER ESOPHAGITIS PRESENT: ICD-10-CM

## 2024-11-07 NOTE — PROGRESS NOTES
Lamar Vee  1970  53 y.o.    Reason for call: GERD  Prep prescribed: N/A  Prep instructions reviewed with patient and sent to patient via regular mail to the home address on file  Is the patient currently on any injectable or oral medications for weight loss or diabetes? No  Clearance needed? No  If yes, what clearance is needed? N/A  Clearance has been requested from na  The patient has been scheduled for: EGD     Lamar Vee is aware they have been scheduled for a screening colonoscopy. Patient has expressed they are not having any symptoms at all.         After your procedure, you will be contacted with results. Please confirm the best phone # to reach the patient: [phone]  Family history of colon cancer? No  If yes, indicate relative: na  Tentative Procedure Date: 12.27.2024      Past Medical History:   Diagnosis Date    Anxiety and depression     Asthma     Disease of thyroid gland     Family history of colon cancer in mother 10/08/2024    H/O: substance abuse     opiates    Hypertension     Melanoma 1992    Migraine     Neuropathy     Sarcoidosis     Seasonal allergies      Allergies   Allergen Reactions    Morphine Headache    Naloxone     Pentazocine Unknown - Low Severity    Tetanus Toxoids     Tyloxapol      Past Surgical History:   Procedure Laterality Date    APPENDECTOMY      BACK SURGERY      lumbar    BRONCHOSCOPY N/A 1/20/2023    Procedure: BRONCHOSCOPY WITH BIOPSIES AND BAL;  Surgeon: Victor M Field DO;  Location: [facility] ENDOSCOPY;  Service: Pulmonary;  Laterality: N/A;  SARCOIDOSIS AND RECURRENT LUNG INFECTIONS    BRONCHOSCOPY N/A 5/2/2023    Procedure: BRONCHOSCOPY WITH BRONCHOALVEOLAR LAVAGE, BIOPSIES, BRUSHING, BRONCHIAL WASHING;  Surgeon: Fawad Christian MD;  Location: [facility] ENDOSCOPY;  Service: Pulmonary;  Laterality: N/A;  RECURRENT PNEUMONIA    BRONCHOSCOPY Bilateral 5/3/2024    Procedure: BRONCHOSCOPY WITH BRONCHIOALVEOLAR LAVAGE;  Surgeon: Rashida  "Victor M Moise, DO;  Location: [facility] ENDOSCOPY;  Service: Pulmonary;  Laterality: Bilateral;  MYCROBACTERIUM AVIUM    CHOLECYSTECTOMY      COLONOSCOPY      ENDOSCOPY N/A 9/27/2024    Procedure: ESOPHAGOGASTRODUODENOSCOPY with biopsy;  Surgeon: Gabe Ritchie MD;  Location: [facility] ENDOSCOPY;  Service: Gastroenterology;  Laterality: N/A;  hiatal hernia, retained food    FACIAL COSMETIC SURGERY      HYSTERECTOMY      SHOULDER SURGERY      spur removal    TONSILLECTOMY AND ADENOIDECTOMY      WISDOM TOOTH EXTRACTION       Social History     Socioeconomic History    Marital status:    Tobacco Use    Smoking status: Never     Passive exposure: Never    Smokeless tobacco: Never   Vaping Use    Vaping status: Never Used   Substance and Sexual Activity    Alcohol use: Not Currently    Drug use: Not Currently     Comment: Pt states \"opiate addiction\".    Sexual activity: Defer       Current Outpatient Medications:     albuterol sulfate  (90 Base) MCG/ACT inhaler, INHALE TWO PUFFS BY MOUTH EVERY 4 HOURS, Disp: 8.5 g, Rfl: 11    amitriptyline (ELAVIL) 100 MG tablet, Take 1 tablet by mouth every night at bedtime., Disp: , Rfl:     buprenorphine (SUBUTEX) 8 MG sublingual tablet SL tablet, Dissolve THREE tablets UNDER THE TONGUE EVERY DAY, Disp: , Rfl: 0    clonazePAM (KlonoPIN) 1 MG tablet, Take 1 tablet by mouth 2 (Two) Times a Day., Disp: , Rfl: 0    esomeprazole (nexIUM) 20 MG capsule, Take 1 capsule by mouth Every 12 (Twelve) Hours., Disp: , Rfl:     fluticasone (Flonase) 50 MCG/ACT nasal spray, 2 sprays into the nostril(s) as directed by provider Daily., Disp: 1 g, Rfl: 3    furosemide (LASIX) 20 MG tablet, Take 1 tablet by mouth Daily., Disp: , Rfl:     lamoTRIgine (LaMICtal) 200 MG tablet, Take 1 tablet by mouth Daily., Disp: , Rfl:     levothyroxine (SYNTHROID, LEVOTHROID) 25 MCG tablet, Take 1 tablet by mouth Every Morning., Disp: , Rfl:     Linzess 290 MCG capsule capsule, Take 1 capsule by mouth " Daily., Disp: , Rfl:     metoclopramide (REGLAN) 5 MG tablet, TAKE ONE TABLET BY MOUTH FOUR TIMES DAILY FOR 30 DAYS, Disp: , Rfl:     ondansetron ODT (ZOFRAN-ODT) 8 MG disintegrating tablet, Place 1 tablet on the tongue Every 12 (Twelve) Hours As Needed for Nausea or Vomiting., Disp: , Rfl:     polyethylene glycol (GoLYTELY) 236 g solution, Take per office instructions, Disp: 4000 mL, Rfl: 0    polyethylene glycol (GoLYTELY) 236 g solution, Take per office instructions, Disp: 4000 mL, Rfl: 0    Pyridium 100 MG tablet, Take 1 tablet by mouth., Disp: , Rfl:     Rimegepant Sulfate (Nurtec) 75 MG tablet dispersible tablet, Take 1 tablet by mouth As Needed (Migraine)., Disp: , Rfl:     tiZANidine (ZANAFLEX) 4 MG tablet, Take 2 tablets by mouth Every 8 (Eight) Hours As Needed for Muscle Spasms., Disp: , Rfl: 5    Benralizumab (Fasenra Pen) 30 MG/ML solution auto-injector, Inject 1 mL under the skin into the appropriate area as directed Every 2 (Two) Months., Disp: 1 mL, Rfl: 5    Budeson-Glycopyrrol-Formoterol (Breztri Aerosphere) 160-9-4.8 MCG/ACT aerosol inhaler, Inhale 2 puffs 2 (Two) Times a Day., Disp: 1 each, Rfl: 4

## 2024-11-07 NOTE — TELEPHONE ENCOUNTER
PT WAS SEEN BY APRIL IN THE PAST FOR C--DIFF. NOW SHE IS HAVING CONSTIPATION, BLACK STOOLS AND SLIGHT ANEMIA. SHE IS SCHEDULED FOR COLONOSCOPY WITH  DR LYON 12/27 BUT WANTED TO KNOW IF WE COULD DO IT SOONER.    No

## 2024-11-08 ENCOUNTER — TELEPHONE (OUTPATIENT)
Dept: SURGERY | Facility: CLINIC | Age: 54
End: 2024-11-08

## 2024-11-08 NOTE — TELEPHONE ENCOUNTER
Hub staff attempted to follow warm transfer process and was unsuccessful     Caller: Lamar Vee     Relationship to patient: SELF    Best call back number: [phone]     Patient is needing: RETURNING CALL TO KRISTEN REGARDING EARLIER SCHEDULE FOR COLONOSCOPY

## 2024-11-08 NOTE — TELEPHONE ENCOUNTER
I lmom for Pt to call the office back. Please let me know when they return my call.  We can see her but can't scope her before Dr. Ritchie can.

## 2024-12-18 NOTE — PRE-PROCEDURE INSTRUCTIONS
"Instructed on date and arrival time of 0800. Instructed that arrival time is not their procedure time but allows time to prepare for procedure.  Come to entrance \"C\". Must have  over age 18 to drive home.  May have two visitors; however, children under 12 must stay in waiting room.  Discussed clear liquid diet (no red or purple), bowel prep, and NPO.  May take medications as usual except for blood thinners, diabetic medications, and weight loss medications.  Verbalized understanding of instructions given.  Instructed to call for questions or concerns.  Pulmonary clearance noted in chart.  "

## 2024-12-20 ENCOUNTER — TELEPHONE (OUTPATIENT)
Dept: PULMONOLOGY | Facility: CLINIC | Age: 54
End: 2024-12-20
Payer: COMMERCIAL

## 2024-12-20 NOTE — TELEPHONE ENCOUNTER
Caller: Lamar Vee    Relationship to Patient: Self    Phone Number: [phone]     Reason for Call: THE PT HAS BEEN EXPOSED TO TB AND SHE WANTED TO INFORM HER DR AND SEE WHAT SHE SHOULD DO NEXT. PLEASE ADVISE.     When was the patient last seen: 10/17/24

## 2024-12-23 NOTE — TELEPHONE ENCOUNTER
How close was the contact and how long was she around the patient  Most of the time it requires significant time around TB  But we should check a TB quatiferon on a few months  TB is not a acute infection it takes months or years to come down with symptoms

## 2024-12-23 NOTE — TELEPHONE ENCOUNTER
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 Patient has a follow up with Dr. Field on 02-.

## 2024-12-26 ENCOUNTER — ANESTHESIA EVENT (OUTPATIENT)
Dept: GASTROENTEROLOGY | Facility: HOSPITAL | Age: 54
End: 2024-12-26
Payer: COMMERCIAL

## 2024-12-26 NOTE — ANESTHESIA PREPROCEDURE EVALUATION
Anesthesia Evaluation     Patient summary reviewed and Nursing notes reviewed   no history of anesthetic complications:   NPO Solid Status: > 8 hours  NPO Liquid Status: > 4 hours           Airway   Mallampati: II  TM distance: >3 FB  No difficulty expected  Dental - normal exam     Pulmonary - normal exam    breath sounds clear to auscultation  (+) asthma (daily inhaler use rescue inhaler once a week),Pneumonia: resolved.    ROS comment: Multiple bronchoscopies for mycobacterium and severe pneumonia  Cardiovascular - normal exam  Exercise tolerance: good (4-7 METS)    Rhythm: regular  Rate: normal    (+) hypertension well controlled less than 2 medications      Neuro/Psych  (+) headaches (1-2 a month), psychiatric history Depression and Anxiety  GI/Hepatic/Renal/Endo    (+) GERD (nexium) well controlled, thyroid problem (no recent medication changes) hypothyroidism    Musculoskeletal     Abdominal  - normal exam   Substance History   (+) drug use (prior history of opiate abuse10 years ago)     OB/GYN          Other      history of cancer (melanoma)    ROS/Med Hx Other:   No EKG on file.  S/P hysterectomy                  Anesthesia Plan    ASA 3     general   total IV anesthesia  (Total IV Anesthesia    Patient understands anesthesia not responsible for dental damage.  )  intravenous induction     Anesthetic plan, risks, benefits, and alternatives have been provided, discussed and informed consent has been obtained with: patient.  Pre-procedure education provided  Plan discussed with CRNA.      CODE STATUS:

## 2024-12-27 ENCOUNTER — HOSPITAL ENCOUNTER (OUTPATIENT)
Facility: HOSPITAL | Age: 54
Setting detail: HOSPITAL OUTPATIENT SURGERY
Discharge: HOME | End: 2024-12-27
Attending: INTERNAL MEDICINE | Admitting: INTERNAL MEDICINE
Payer: COMMERCIAL

## 2024-12-27 ENCOUNTER — ANESTHESIA (OUTPATIENT)
Dept: GASTROENTEROLOGY | Facility: HOSPITAL | Age: 54
End: 2024-12-27
Payer: COMMERCIAL

## 2024-12-27 VITALS
TEMPERATURE: 98.6 F | WEIGHT: 145.72 LBS | SYSTOLIC BLOOD PRESSURE: 128 MMHG | HEIGHT: 61 IN | DIASTOLIC BLOOD PRESSURE: 60 MMHG | OXYGEN SATURATION: 97 % | RESPIRATION RATE: 12 BRPM | BODY MASS INDEX: 27.51 KG/M2 | HEART RATE: 73 BPM

## 2024-12-27 DIAGNOSIS — K59.04 CHRONIC IDIOPATHIC CONSTIPATION: ICD-10-CM

## 2024-12-27 DIAGNOSIS — Z12.11 ENCOUNTER FOR SCREENING FOR MALIGNANT NEOPLASM OF COLON: ICD-10-CM

## 2024-12-27 PROCEDURE — 25010000002 MIDAZOLAM PER 1MG: Performed by: NURSE ANESTHETIST, CERTIFIED REGISTERED

## 2024-12-27 PROCEDURE — 25010000002 LIDOCAINE PF 2% 2 % SOLUTION

## 2024-12-27 PROCEDURE — 45378 DIAGNOSTIC COLONOSCOPY: CPT | Performed by: INTERNAL MEDICINE

## 2024-12-27 PROCEDURE — 88305 TISSUE EXAM BY PATHOLOGIST: CPT | Performed by: INTERNAL MEDICINE

## 2024-12-27 PROCEDURE — 43239 EGD BIOPSY SINGLE/MULTIPLE: CPT | Performed by: INTERNAL MEDICINE

## 2024-12-27 PROCEDURE — 25010000002 PROPOFOL 10 MG/ML EMULSION

## 2024-12-27 RX ORDER — MIDAZOLAM HYDROCHLORIDE 2 MG/2ML
2 INJECTION, SOLUTION INTRAMUSCULAR; INTRAVENOUS ONCE
Status: COMPLETED | OUTPATIENT
Start: 2024-12-27 | End: 2024-12-27

## 2024-12-27 RX ORDER — LIDOCAINE HYDROCHLORIDE 20 MG/ML
INJECTION, SOLUTION EPIDURAL; INFILTRATION; INTRACAUDAL; PERINEURAL AS NEEDED
Status: DISCONTINUED | OUTPATIENT
Start: 2024-12-27 | End: 2024-12-27 | Stop reason: SURG

## 2024-12-27 RX ORDER — PROPOFOL 10 MG/ML
VIAL (ML) INTRAVENOUS AS NEEDED
Status: DISCONTINUED | OUTPATIENT
Start: 2024-12-27 | End: 2024-12-27 | Stop reason: SURG

## 2024-12-27 RX ORDER — SODIUM CHLORIDE, SODIUM LACTATE, POTASSIUM CHLORIDE, CALCIUM CHLORIDE 600; 310; 30; 20 MG/100ML; MG/100ML; MG/100ML; MG/100ML
30 INJECTION, SOLUTION INTRAVENOUS CONTINUOUS
Status: DISCONTINUED | OUTPATIENT
Start: 2024-12-27 | End: 2024-12-27 | Stop reason: HOSPADM

## 2024-12-27 RX ADMIN — MIDAZOLAM HYDROCHLORIDE 2 MG: 1 INJECTION, SOLUTION INTRAMUSCULAR; INTRAVENOUS at 08:54

## 2024-12-27 RX ADMIN — LIDOCAINE HYDROCHLORIDE 50 MG: 20 INJECTION, SOLUTION INTRAVENOUS at 09:34

## 2024-12-27 RX ADMIN — PROPOFOL 100 MG: 10 INJECTION, EMULSION INTRAVENOUS at 09:34

## 2024-12-27 RX ADMIN — PROPOFOL 200 MCG/KG/MIN: 10 INJECTION, EMULSION INTRAVENOUS at 09:35

## 2024-12-27 NOTE — ANESTHESIA POSTPROCEDURE EVALUATION
Patient: Lamar Vee    Procedure Summary       Date: 12/27/24 Room / Location: [facility] ENDOSCOPY 4 / [facility] ENDOSCOPY    Anesthesia Start: 0932 Anesthesia Stop: 0958    Procedures:       COLONOSCOPY      ESOPHAGOGASTRODUODENOSCOPY Diagnosis:       Chronic idiopathic constipation      Encounter for screening for malignant neoplasm of colon      (Chronic idiopathic constipation [K59.04])      (Encounter for screening for malignant neoplasm of colon [Z12.11])      (Gastroesophageal reflux disease, unspecified whether esophagitis present [K21.9])    Surgeons: Gabe Ritchie MD Provider: Alejandro Webster CRNA    Anesthesia Type: general ASA Status: 3            Anesthesia Type: general    Vitals  Vitals Value Taken Time   /60 12/27/24 1019   Temp 37 °C (98.6 °F) 12/27/24 1017   Pulse 69 12/27/24 1019   Resp 12 12/27/24 1017   SpO2 97 % 12/27/24 1017   Vitals shown include unfiled device data.        Post Anesthesia Care and Evaluation    Patient location during evaluation: bedside  Patient participation: complete - patient participated  Level of consciousness: awake  Pain management: adequate    Airway patency: patent  Anesthetic complications: No anesthetic complications  PONV Status: controlled  Cardiovascular status: acceptable and stable  Respiratory status: acceptable

## 2024-12-27 NOTE — H&P
Pre Procedure History & Physical    Chief Complaint:   Heartburn and change in bowel habits    Subjective     HPI:   Change in bowel habits and heartburn    Past Medical History:   Past Medical History:   Diagnosis Date    Anxiety and depression     Asthma     Disease of thyroid gland     Family history of colon cancer in mother 10/08/2024    H/O: substance abuse     opiates    Hypertension     Melanoma 1992    Migraine     Neuropathy     Sarcoidosis     Seasonal allergies        Past Surgical History:  Past Surgical History:   Procedure Laterality Date    APPENDECTOMY      BACK SURGERY      lumbar    BRONCHOSCOPY N/A 1/20/2023    Procedure: BRONCHOSCOPY WITH BIOPSIES AND BAL;  Surgeon: Victor M Field DO;  Location: [facility] ENDOSCOPY;  Service: Pulmonary;  Laterality: N/A;  SARCOIDOSIS AND RECURRENT LUNG INFECTIONS    BRONCHOSCOPY N/A 5/2/2023    Procedure: BRONCHOSCOPY WITH BRONCHOALVEOLAR LAVAGE, BIOPSIES, BRUSHING, BRONCHIAL WASHING;  Surgeon: Fawad Christian MD;  Location: [facility] ENDOSCOPY;  Service: Pulmonary;  Laterality: N/A;  RECURRENT PNEUMONIA    BRONCHOSCOPY Bilateral 5/3/2024    Procedure: BRONCHOSCOPY WITH BRONCHIOALVEOLAR LAVAGE;  Surgeon: Victor M Field DO;  Location: [facility] ENDOSCOPY;  Service: Pulmonary;  Laterality: Bilateral;  MYCROBACTERIUM AVIUM    CHOLECYSTECTOMY      COLONOSCOPY      ENDOSCOPY N/A 9/27/2024    Procedure: ESOPHAGOGASTRODUODENOSCOPY with biopsy;  Surgeon: Gabe Ritchie MD;  Location: [facility] ENDOSCOPY;  Service: Gastroenterology;  Laterality: N/A;  hiatal hernia, retained food    FACIAL COSMETIC SURGERY      HYSTERECTOMY      SHOULDER SURGERY      spur removal    TONSILLECTOMY AND ADENOIDECTOMY      WISDOM TOOTH EXTRACTION         Family History:  Family History   Problem Relation Age of Onset    Colon cancer Mother 50    Cancer Mother     Cancer Maternal Aunt     Heart disease Maternal Aunt     Malig Hyperthermia Neg Hx        Social History:    reports that she has never smoked. She has never been exposed to tobacco smoke. She has never used smokeless tobacco. She reports that she does not currently use alcohol. She reports that she does not currently use drugs.    Medications:   Medications Prior to Admission   Medication Sig Dispense Refill Last Dose/Taking    albuterol sulfate  (90 Base) MCG/ACT inhaler INHALE TWO PUFFS BY MOUTH EVERY 4 HOURS 8.5 g 11     amitriptyline (ELAVIL) 100 MG tablet Take 1 tablet by mouth every night at bedtime.       Benralizumab (Fasenra Pen) 30 MG/ML solution auto-injector Inject 1 mL under the skin into the appropriate area as directed Every 2 (Two) Months. 1 mL 5     Budeson-Glycopyrrol-Formoterol (Breztri Aerosphere) 160-9-4.8 MCG/ACT aerosol inhaler Inhale 2 puffs 2 (Two) Times a Day. 1 each 4     buprenorphine (SUBUTEX) 8 MG sublingual tablet SL tablet Dissolve THREE tablets UNDER THE TONGUE EVERY DAY  0     clonazePAM (KlonoPIN) 1 MG tablet Take 1 tablet by mouth 2 (Two) Times a Day.  0     esomeprazole (nexIUM) 20 MG capsule Take 1 capsule by mouth Every 12 (Twelve) Hours.       fluticasone (Flonase) 50 MCG/ACT nasal spray 2 sprays into the nostril(s) as directed by provider Daily. 1 g 3     furosemide (LASIX) 20 MG tablet Take 1 tablet by mouth Daily.       lamoTRIgine (LaMICtal) 200 MG tablet Take 1 tablet by mouth Daily.       levothyroxine (SYNTHROID, LEVOTHROID) 25 MCG tablet Take 1 tablet by mouth Every Morning.       Linzess 290 MCG capsule capsule Take 1 capsule by mouth Daily.       metoclopramide (REGLAN) 5 MG tablet TAKE ONE TABLET BY MOUTH FOUR TIMES DAILY FOR 30 DAYS       ondansetron ODT (ZOFRAN-ODT) 8 MG disintegrating tablet Place 1 tablet on the tongue Every 12 (Twelve) Hours As Needed for Nausea or Vomiting.       polyethylene glycol (GoLYTELY) 236 g solution Take per office instructions 4000 mL 0     polyethylene glycol (GoLYTELY) 236 g solution Take per office instructions 4000 mL 0      Pyridium 100 MG tablet Take 1 tablet by mouth.       Rimegepant Sulfate (Nurtec) 75 MG tablet dispersible tablet Take 1 tablet by mouth As Needed (Migraine).       tiZANidine (ZANAFLEX) 4 MG tablet Take 2 tablets by mouth Every 8 (Eight) Hours As Needed for Muscle Spasms.  5        Allergies:  Morphine, Naloxone, Pentazocine, Tetanus toxoids, and Tyloxapol        Objective     Weight 66.1 kg (145 lb 11.6 oz).    Physical Exam   Constitutional: Pt is oriented to person, place, and time and well-developed, well-nourished, and in no distress.   Mouth/Throat: Oropharynx is clear and moist.   Neck: Normal range of motion.   Cardiovascular: Normal rate, regular rhythm and normal heart sounds.    Pulmonary/Chest: Effort normal and breath sounds normal.   Abdominal: Soft. Nontender  Skin: Skin is warm and dry.   Psychiatric: Mood, memory, affect and judgment normal.     Assessment & Plan     Diagnosis:  Change in bowel habits heartburn    Anticipated Surgical Procedure:  EGD colonoscopy    The risks, benefits, and alternatives of this procedure have been discussed with the patient or the responsible party- the patient understands and agrees to proceed.

## 2024-12-30 ENCOUNTER — TELEPHONE (OUTPATIENT)
Dept: GASTROENTEROLOGY | Facility: CLINIC | Age: 54
End: 2024-12-30
Payer: COMMERCIAL

## 2024-12-30 NOTE — TELEPHONE ENCOUNTER
Caller:     Relationship to patient:     Best call back number: [phone]    Chief complaint: PT SAID SHE WAS TOLD ON FRIDAY 12.27 THAT SHE WOULD HAVE TO GET ANOTHER COLONOSCOPY SCHEDULED IN 2 MONTHS, BECAUSE SHE WASN'T CLEANED OUT ENOUGH FOR THE ONE SCHEDULE 12.27. SHE ALSO STATED DR. LYON MENTIONED SOMETHING ABOUT GETTING HER SCHEDULED WITH A DOCTOR IN [place] TO go in and open something up or put a stint in to make her bowels work. SHE NEEDS TE COLONOSCOPY SCHEDULED FOR A Friday,       Type of visit: COLONOSCOPY AND SOME OTHER PROCEDURE    Requested date: FRIDAYS, IN 2 MONTHS TIMES     If rescheduling, when is the original appointment:      Additional notes:

## 2024-12-31 DIAGNOSIS — K59.04 CHRONIC IDIOPATHIC CONSTIPATION: ICD-10-CM

## 2024-12-31 DIAGNOSIS — R11.2 NAUSEA AND VOMITING, UNSPECIFIED VOMITING TYPE: Primary | ICD-10-CM

## 2024-12-31 DIAGNOSIS — K31.84 GASTROPARESIS: ICD-10-CM

## 2024-12-31 NOTE — PROGRESS NOTES
EGD 12/27/2024: Normal esophagus-biopsy with reflux esophagitis otherwise negative, large amount of food found in the stomach body, normal duodenum  Dr. Ritchie recommends referral to motility center-order created, please fax records    Colonoscopy 12/27/2024: Inadequate prep  Repeat colon in 2 months due to poor prep-patient will need 2 days of clear liquids, MiraLAX 7 days before prep day and 4L prep

## 2025-01-03 ENCOUNTER — PREP FOR SURGERY (OUTPATIENT)
Dept: OTHER | Facility: HOSPITAL | Age: 55
End: 2025-01-03
Payer: COMMERCIAL

## 2025-01-03 ENCOUNTER — TELEPHONE (OUTPATIENT)
Dept: GASTROENTEROLOGY | Facility: CLINIC | Age: 55
End: 2025-01-03
Payer: COMMERCIAL

## 2025-01-03 DIAGNOSIS — K59.04 CHRONIC IDIOPATHIC CONSTIPATION: ICD-10-CM

## 2025-01-03 DIAGNOSIS — Z12.11 ENCOUNTER FOR SCREENING FOR MALIGNANT NEOPLASM OF COLON: Primary | ICD-10-CM

## 2025-01-03 RX ORDER — POLYETHYLENE GLYCOL 3350, SODIUM SULFATE ANHYDROUS, SODIUM BICARBONATE, SODIUM CHLORIDE, POTASSIUM CHLORIDE 236; 22.74; 6.74; 5.86; 2.97 G/4L; G/4L; G/4L; G/4L; G/4L
4 POWDER, FOR SOLUTION ORAL ONCE
Qty: 4000 ML | Refills: 0 | Status: SHIPPED | OUTPATIENT
Start: 2025-01-03 | End: 2025-01-03

## 2025-01-03 NOTE — TELEPHONE ENCOUNTER
Called patient and dicussed low residue diet, patient is agreeable to doing full Low residue diet in march for colonoscopy. Patient has requested a Friday in march. Submitted case request once sign I will call patient to schedule procedure.           Patient's procedure is scheduled for MARCH ?? 2025.  Patient aware of date and time. Patient aware a pre-admission testing nurse will call prior to procedure to confirm arrival time.     I recommended to the patient start a low residue diet 1 week prior to scheduled procedure. I explained to patient that they will need to be on the low residue diet for 7 days.     Patient is aware to also take 1 capful of Miralax mixed with 8 ounces of water every day for 7 days prior to starting the clear liquid diet, beginning 7 days before her procedure.     Patient is aware to start clear liquid diet 2 days prior to procedure. You will begin the clear liquid diet 2 days before procedure.      Patient aware that the evening prior to procedure, patient will begin the first dose of bowel prep at 6:00 PM EST. Patient aware to take second dose of bowel prep 4 hours prior to scheduled arrival time the morning of their procedure.     Patient aware I will send a  [employer] message  instructions for prescribed bowel prep, and low residue diet to the patient.      Patient expressed understanding of all instructions.

## 2025-01-03 NOTE — TELEPHONE ENCOUNTER
Called patient once case request was signed and got patient scheduled for 3/13/2025.    Attempted to go over instructions for extended prep again and patient refuses to do Golyt[place]. Advised patient that since she followed the clear liquid diet instructions and finished whole prep and still had stool in the entire colon that if she does that prep again she may have done the whole week of prepping for nothing. Patient stated she would still like to have clenpiq, please advise and I will update prep instructions with new prep.

## 2025-01-03 NOTE — TELEPHONE ENCOUNTER
Spoke to patient and informed of OSMAN Hood result note and recommendations.  Verified patient understanding.     Confirmed follow up appointment with OSMAN Hood on 01.30.25 at 1400.    Patient confirmed Avelina GORMAN MA has already discussed extended prep with patient (see TE dated 12.30.24) and is working on scheduling patient for repeat Colonoscopy.

## 2025-01-03 NOTE — TELEPHONE ENCOUNTER
----- Message from Marilou Sosa sent at 12/31/2024 12:12 PM EST -----  EGD 12/27/2024: Normal esophagus-biopsy with reflux esophagitis otherwise negative, large amount of food found in the stomach body, normal duodenum  Dr. Ritchie recommends referral to motility center-order created, please fax records    Colonoscopy 12/27/2024: Inadequate prep  Repeat colon in 2 months due to poor prep-patient will need 2 days of clear liquids, MiraLAX 7 days before prep day and 4L prep

## 2025-01-07 ENCOUNTER — TELEPHONE (OUTPATIENT)
Dept: PULMONOLOGY | Facility: CLINIC | Age: 55
End: 2025-01-07

## 2025-01-07 NOTE — TELEPHONE ENCOUNTER
Caller: Lamar Vee    Relationship: Self    Best call back number: [phone]     Which medication are you concerned about:   Benralizumab (Fasenra Pen) 30 MG/ML solution auto-injector     Who prescribed you this medication: MALGORZATA    When did you start taking this medication:     What are your concerns: PT SPOKE WITH THIS PHARMACY AND SHE WAS TOLD THAT THIS MEDICATION WOULD BE $250 OR THEY WOULD NEED MORE DOCUMENTATION REGARDING WHY SHE NEEDS IT  [facility] - [address] - [phone]  - [phone] [address] [address]   Phone: [phone] Fax: [phone]     How long have you had these concerns: TODAY

## 2025-01-09 ENCOUNTER — TELEMEDICINE (OUTPATIENT)
Dept: GASTROENTEROLOGY | Facility: CLINIC | Age: 55
End: 2025-01-09
Payer: COMMERCIAL

## 2025-01-09 ENCOUNTER — TELEPHONE (OUTPATIENT)
Dept: GASTROENTEROLOGY | Facility: CLINIC | Age: 55
End: 2025-01-09

## 2025-01-09 DIAGNOSIS — R53.81 MALAISE AND FATIGUE: ICD-10-CM

## 2025-01-09 DIAGNOSIS — K31.84 GASTROPARESIS: ICD-10-CM

## 2025-01-09 DIAGNOSIS — K62.5 RECTAL BLEEDING: Primary | ICD-10-CM

## 2025-01-09 DIAGNOSIS — R19.7 DIARRHEA, UNSPECIFIED TYPE: ICD-10-CM

## 2025-01-09 DIAGNOSIS — R53.83 MALAISE AND FATIGUE: ICD-10-CM

## 2025-01-09 RX ORDER — TERIPARATIDE 250 UG/ML
INJECTION, SOLUTION SUBCUTANEOUS
COMMUNITY
Start: 2024-12-08

## 2025-01-09 RX ORDER — CALCIUM CITRATE/VITAMIN D3 315MG-6.25
TABLET ORAL
COMMUNITY
Start: 2024-11-22

## 2025-01-09 RX ORDER — DEXTROMETHORPHAN HYDROBROMIDE AND PROMETHAZINE HYDROCHLORIDE 15; 6.25 MG/5ML; MG/5ML
5 SYRUP ORAL
COMMUNITY
Start: 2025-01-07

## 2025-01-09 NOTE — TELEPHONE ENCOUNTER
S/w pt, she was wanting orders placed by our office instead of going to the ER. I explained to pt that we wouldn't be able to have orders placed today and testing completed today due to insurance. Pt is agreeable to going to the ER.

## 2025-01-09 NOTE — TELEPHONE ENCOUNTER
Hub staff attempted to follow warm transfer process and was unsuccessful     Caller: Lamar Vee    Relationship to patient: Self    Best call back number: [phone]     Patient is needing: HAD FOLLOW UP QUESTIONS ABOUT DOING CT SCAN

## 2025-01-09 NOTE — PROGRESS NOTES
Patient Name: Lamar Vee   Visit Date: 01/09/2025   Patient ID: [id number]  Provider: OSMAN Hutson    Sex: female  Location:  Location Address:  Location Phone: [address] [facility] [place] [address] [phone]    YOB: 1970  Age: 54 y.o.   Primary Care Provider Everett Tapia MD      Referring Provider: No ref. provider found        This was an audio and video enabled telemedicine encounter.  Zulma Shah MA assisted w/ the video visit today.  Patient was located at home and I am at the office on [address].    Chief Complaint  Constipation (Pt states has improved with taking Miralax daily with Linzess ) and Rectal Bleeding (With every BM since 1.7.25 in the toilet )    History of Present Illness  Pt was intially set up for EGD through [facility] 8/2024 - pt states she had n/v and wasn't feeling well.   EGD 9/27/2024: Small hiatal hernia, normal esophagus, bilious fluid was found in the stomach body-stomach biopsy was negative, medium amount of food in the stomach body, normal duodenum   Gastric emptying scan 10/11/24:  18% remaining at 4 hours    Patient last seen 10/22/2024, reported chronic constipation that had worsened, had been taking Linzess to 90 for a couple of years and was not eating daily until the last few weeks.  Reported history of obstruction 20 years prior resolved with NG tube. Pt has had several abdominal surgeries. Hx tubal, hx partial hysterectomy, 10 yrs later had ovaries removed. Hx lap ron, and appy done with hysterectomy.   Patient has history of C. difficile in 2021    EGD 12/27/2024: Normal esophagus-biopsy with reflux esophagitis otherwise negative, large amount of food found in the stomach body, normal duodenum  Dr. Ritchie recommends referral to motility center-  Colonoscopy 12/27/2024: Inadequate prep  Repeat colon in 2 months due to poor prep-patient will need 2 days of clear liquids, MiraLAX 7 days before prep day and 4L prep-patient refused this but  willing to do Plenvu 2 days in a row and 2 days of clear liquids    Pt states she's been taking Miralax and Linzess daily, she hasn't been straining any, she started having rectal bleeding 2 days ago, seeing every BM, states today seems less. Normally having 2-3 stools/d , but the last 2 days has had 6-7 stools/d, couldn't go to work yesterday. Some cramps this morning. No abd pain, n/v , or fever. Eating the same, no pain w meals, usually eats 2 x day d/t gastroparesis issues.   Denies dizziness/lightheadedness but does feel fatigued more than normal today.   Past Medical History:   Diagnosis Date    Anxiety and depression     Asthma     Disease of thyroid gland     Family history of colon cancer in mother 10/08/2024    H/O: substance abuse     opiates    Hypertension     Melanoma 1992    Migraine     Neuropathy     Osteoporosis     Sarcoidosis     Seasonal allergies        Past Surgical History:   Procedure Laterality Date    ABDOMINAL SURGERY  2000    APPENDECTOMY      BACK SURGERY      lumbar    BRONCHOSCOPY N/A 01/20/2023    Procedure: BRONCHOSCOPY WITH BIOPSIES AND BAL;  Surgeon: Victor M Field DO;  Location: [facility] ENDOSCOPY;  Service: Pulmonary;  Laterality: N/A;  SARCOIDOSIS AND RECURRENT LUNG INFECTIONS    BRONCHOSCOPY N/A 05/02/2023    Procedure: BRONCHOSCOPY WITH BRONCHOALVEOLAR LAVAGE, BIOPSIES, BRUSHING, BRONCHIAL WASHING;  Surgeon: Fawad Christian MD;  Location: [facility] ENDOSCOPY;  Service: Pulmonary;  Laterality: N/A;  RECURRENT PNEUMONIA    BRONCHOSCOPY Bilateral 05/03/2024    Procedure: BRONCHOSCOPY WITH BRONCHIOALVEOLAR LAVAGE;  Surgeon: Victor M Field DO;  Location: [facility] ENDOSCOPY;  Service: Pulmonary;  Laterality: Bilateral;  MYCROBACTERIUM AVIUM    CHOLECYSTECTOMY      COLONOSCOPY      COLONOSCOPY N/A 12/27/2024    Procedure: COLONOSCOPY;  Surgeon: Gabe Ritchie MD;  Location: [facility] ENDOSCOPY;  Service: Gastroenterology;  Laterality: N/A;  incomplete poor prep  "   ENDOSCOPY N/A 09/27/2024    Procedure: ESOPHAGOGASTRODUODENOSCOPY with biopsy;  Surgeon: Gabe Ritchie MD;  Location: [facility] ENDOSCOPY;  Service: Gastroenterology;  Laterality: N/A;  hiatal hernia, retained food    ENDOSCOPY N/A 12/27/2024    Procedure: ESOPHAGOGASTRODUODENOSCOPY;  Surgeon: Gabe Ritchie MD;  Location: [facility] ENDOSCOPY;  Service: Gastroenterology;  Laterality: N/A;  gastric bx    FACIAL COSMETIC SURGERY      HYSTERECTOMY      SHOULDER SURGERY      spur removal    TONSILLECTOMY AND ADENOIDECTOMY      UPPER GASTROINTESTINAL ENDOSCOPY      12:2024    WISDOM TOOTH EXTRACTION         Allergies   Allergen Reactions    Morphine Headache    Naloxone     Pentazocine Unknown - Low Severity    Tetanus Toxoids     Tyloxapol        Family History   Problem Relation Age of Onset    Colon cancer Mother 50    Cancer Mother     Cancer Maternal Aunt     Heart disease Maternal Aunt     Malig Hyperthermia Neg Hx         Social History     Tobacco Use    Smoking status: Never     Passive exposure: Never    Smokeless tobacco: Never   Vaping Use    Vaping status: Never Used   Substance Use Topics    Alcohol use: Not Currently    Drug use: Not Currently     Types: Anabolic steroids     Comment: Pt states \"opiate addiction\".       Objective     Vital Signs:   There were no vitals taken for this visit.      Physical Exam  Constitutional:       General: The patient is not in acute distress.     Appearance: Normal appearance.   HENT:      Head: Normocephalic and atraumatic.      Nose: Nose normal.   Pulmonary:      Effort: Pulmonary effort is normal. No respiratory distress.   Skin:     General: Skin appears warm and dry.   Neurological:      General: No focal deficit present.      Mental Status: The patient is alert and oriented to person, place, and time.   Psychiatric:         Mood and Affect: Mood normal.         Speech: Speech normal.         Behavior: Behavior normal.         Thought Content: Thought " content normal.     Result Review :   The following data was reviewed by: OSMAN Hutson on 01/09/2025:    CBC w/diff          4/30/2024    12:29 10/22/2024    12:28   CBC w/Diff   WBC 6.75  6.53    RBC 4.39  4.35    Hemoglobin 12.7  11.9    Hematocrit 38.0  38.0    MCV 86.6  87.4    MCH 28.9  27.4    MCHC 33.4  31.3    RDW 12.5  12.7    Platelets 202  250    Neutrophil Rel % 78.0  75.0    Immature Granulocyte Rel % 0.3  0.3    Lymphocyte Rel % 15.4  17.8    Monocyte Rel % 6.2  6.9    Eosinophil Rel % 0.0  0.0    Basophil Rel % 0.1  0.0      CMP          10/22/2024    12:28   CMP   Glucose 83    BUN 9    Creatinine 0.93    EGFR 72.7    Sodium 141    Potassium 4.0    Chloride 102    Calcium 9.2    Total Protein 6.9    Albumin 4.0    Globulin 2.9    Total Bilirubin 0.3    Alkaline Phosphatase 89    AST (SGOT) 15    ALT (SGPT) 11    Albumin/Globulin Ratio 1.4    BUN/Creatinine Ratio 9.7    Anion Gap 8.2                    Assessment and Plan    Diagnoses and all orders for this visit:    1. Rectal bleeding (Primary)    2. Diarrhea, unspecified type  Comments:  Patient has baseline constipation and has been taking Linzess and MiraLAX with good results but currently having diarrhea for 2 days    3. Gastroparesis    4. Malaise and fatigue            Follow Up   Return if symptoms worsen or fail to improve.  Needs work note for yesterday and pt was off today.   I advised patient to go to the emergency room as she has had persistent rectal bleeding for 2 days so she can have evaluation with stat labs possible CT scan.  Recent rectal exam with Dr. Ritchie was negative for hemorrhoids and patient has not been straining.  I did advise her to stop Linzess and MiraLAX at this time as she has been having diarrhea with rectal bleeding.  Once stools return to normal she may resume.  Patient was agreeable to plan.  Discussed with patient we may need to do colonoscopy sooner than planned in March, await ER  workup.    Patient was given instructions and counseling regarding her condition or for health maintenance advice. Please see specific information pulled into the AVS if appropriate.

## 2025-01-14 ENCOUNTER — TELEPHONE (OUTPATIENT)
Dept: GASTROENTEROLOGY | Facility: CLINIC | Age: 55
End: 2025-01-14
Payer: COMMERCIAL

## 2025-01-14 NOTE — TELEPHONE ENCOUNTER
----- Message from Marilou Sosa sent at 1/14/2025 10:11 AM EST -----  ER visit 1/10/2025-CT without contrast performed showed moderate retained stool within the cecum and ascending colon but no acute abnormalities seen, potassium was low at 3.3, albumin low at 3.2, AST 15, ALT 32, GFR 72, hemoglobin low normal at 11.7    Please call and check on patient and see if rectal bleeding stopped-please move up colonoscopy to a sooner date, as CT showed moderate stool still in the colon recommend she resume MiraLAX and Linzess  Recommend she follow-up with primary care regarding low potassium

## 2025-01-14 NOTE — TELEPHONE ENCOUNTER
Hay BRYAN approved. Attempted to notify pt, she did not answer but I left a message that PA has been approved and to  the prep from the pharmacy. I have also sent bowel prep instructions via Re-APP.

## 2025-01-15 ENCOUNTER — TELEPHONE (OUTPATIENT)
Dept: GASTROENTEROLOGY | Facility: CLINIC | Age: 55
End: 2025-01-15
Payer: COMMERCIAL

## 2025-01-15 NOTE — TELEPHONE ENCOUNTER
1/15/2025    URGENT  Dear ,      Patient Name: Lamar Vee  : 1970      This patient is waiting to have a Colonoscopy and/or Esophagogastroduodenoscopy which I will perform at [facility] on _25_. Please respond to this request noting your recommendations regarding clearance from a Pulmonary  standpoint.  You may contact our office at [phone] Option 2 with any questions. I appreciate your prompt response in this matter. Please return this form to our office as soon as possible.    ____ I approve my patient from a Pulmonary  standpoint    ____ I do NOT approve my patient from a Pulmonary  standpoint at this time    Please inform our office if the patient requires additional follow-up from your office prior to scheduled procedure date.      Please specify clearance expiration date:____________________________________      Approving physician name (please print): _____________________________________________      Approving physician signature: ________________________________ Date:________________  Sincerely,  [facility] - Gastroenterology             Please fax approval or denial to our office as soon as possible.

## 2025-01-21 ENCOUNTER — TELEPHONE (OUTPATIENT)
Dept: GASTROENTEROLOGY | Facility: CLINIC | Age: 55
End: 2025-01-21
Payer: COMMERCIAL

## 2025-01-21 NOTE — TELEPHONE ENCOUNTER
Pt called stating she was in the ER at [facility] over the weekend due to vomiting. She had 10+ episodes of vomiting yesterday and 3 this morning when she woke up. Pt has been able to keep some sprite down today. She has not eaten solid food since Friday evening, since then she has been doing liquids/bland diet. Pt has been taking Zofran. She has not tried Maribeth products but is agreeable to trying. I have requested ER Records from [facility].

## 2025-01-21 NOTE — PRE-PROCEDURE INSTRUCTIONS
Reminded of arrival time at  0630       , Entrance C of the [facility] hospital. Instructed to bring or have a  over the age of 18 set up to drive you home the day of procedure.    Instructed on clear liquid diet the day before, nothing red or purple. Call with any questions about the prep or if in need of the prep.  Reminded them not to eat or drink anything am of procedure unless its a sip of water with medications.  Patient verbalized understanding. Hold diuretics am of procedure.

## 2025-01-21 NOTE — TELEPHONE ENCOUNTER
I called [facility] scheduling, they stated they did not receive the referral we faxed on 1.3.25. I verified that it was sent to the correct fax number and faxed again.

## 2025-01-21 NOTE — TELEPHONE ENCOUNTER
Rec'd a call back from [facility] GI motility, they have now received the referral and will be reaching out to pt to schedule.

## 2025-01-21 NOTE — TELEPHONE ENCOUNTER
Called and notified pt. She is agreeable to go to the ER if symptoms worsen or is she experiences any lightheadedness/dizziness, or cannot keep liquids down. Pt states she was able to keep a sprite, jello, and a banana down today.

## 2025-01-23 ENCOUNTER — ANESTHESIA EVENT (OUTPATIENT)
Dept: GASTROENTEROLOGY | Facility: HOSPITAL | Age: 55
End: 2025-01-23
Payer: COMMERCIAL

## 2025-01-23 NOTE — ANESTHESIA PREPROCEDURE EVALUATION
Anesthesia Evaluation     Patient summary reviewed and Nursing notes reviewed   NPO Solid Status: > 8 hours  NPO Liquid Status: > 6 hours           Airway   Mallampati: II  TM distance: >3 FB  Neck ROM: full  No difficulty expected  Dental    (+) poor dentition    Pulmonary - normal exam    breath sounds clear to auscultation  (+) asthma (uses inhaler prn),  Cardiovascular - normal exam  Exercise tolerance: good (4-7 METS)    Rhythm: regular  Rate: normal    (+) hypertension well controlled less than 2 medications      Neuro/Psych  (+) headaches, psychiatric history Depression and Anxiety  GI/Hepatic/Renal/Endo    (+) obesity, GERD well controlled, thyroid problem     Musculoskeletal     Abdominal    Substance History   (+) drug use (opiate addiction, clean since > 1 yr)     OB/GYN          Other      history of cancer    ROS/Med Hx Other: Chronic constipation    Scanned tele 12/27/24 NSR      Phys Exam Other: Versed 2 mg IV in preop               Anesthesia Plan    ASA 3     general   total IV anesthesia  (Total IV Anesthesia    Patient understands anesthesia not responsible for dental damage.      Discussed risks with pt including aspiration, allergic reactions, apnea, advanced airway placement. Pt verbalized understanding. All questions answered.     )  intravenous induction     Anesthetic plan, risks, benefits, and alternatives have been provided, discussed and informed consent has been obtained with: patient, spouse/significant other and mother.  Pre-procedure education provided  Plan discussed with CRNA.    CODE STATUS:

## 2025-01-24 ENCOUNTER — ANESTHESIA (OUTPATIENT)
Dept: GASTROENTEROLOGY | Facility: HOSPITAL | Age: 55
End: 2025-01-24
Payer: COMMERCIAL

## 2025-01-24 ENCOUNTER — HOSPITAL ENCOUNTER (OUTPATIENT)
Facility: HOSPITAL | Age: 55
Setting detail: HOSPITAL OUTPATIENT SURGERY
Discharge: HOME | End: 2025-01-24
Attending: INTERNAL MEDICINE | Admitting: INTERNAL MEDICINE
Payer: COMMERCIAL

## 2025-01-24 VITALS
HEART RATE: 72 BPM | DIASTOLIC BLOOD PRESSURE: 84 MMHG | WEIGHT: 140.65 LBS | OXYGEN SATURATION: 99 % | TEMPERATURE: 97.4 F | BODY MASS INDEX: 26.56 KG/M2 | SYSTOLIC BLOOD PRESSURE: 119 MMHG | RESPIRATION RATE: 12 BRPM | HEIGHT: 61 IN

## 2025-01-24 DIAGNOSIS — K59.04 CHRONIC IDIOPATHIC CONSTIPATION: ICD-10-CM

## 2025-01-24 DIAGNOSIS — Z12.11 ENCOUNTER FOR SCREENING FOR MALIGNANT NEOPLASM OF COLON: ICD-10-CM

## 2025-01-24 PROCEDURE — 25010000002 LIDOCAINE PF 2% 2 % SOLUTION: Performed by: NURSE ANESTHETIST, CERTIFIED REGISTERED

## 2025-01-24 PROCEDURE — 45385 COLONOSCOPY W/LESION REMOVAL: CPT | Performed by: INTERNAL MEDICINE

## 2025-01-24 PROCEDURE — 25010000002 MIDAZOLAM PER 1MG

## 2025-01-24 PROCEDURE — 25810000003 LACTATED RINGERS PER 1000 ML: Performed by: NURSE ANESTHETIST, CERTIFIED REGISTERED

## 2025-01-24 PROCEDURE — 88305 TISSUE EXAM BY PATHOLOGIST: CPT | Performed by: INTERNAL MEDICINE

## 2025-01-24 PROCEDURE — 25010000002 PROPOFOL 10 MG/ML EMULSION: Performed by: NURSE ANESTHETIST, CERTIFIED REGISTERED

## 2025-01-24 PROCEDURE — 45390 COLONOSCOPY W/RESECTION: CPT | Performed by: INTERNAL MEDICINE

## 2025-01-24 PROCEDURE — 25010000002 GLUCAGON (RDNA) PER 1 MG: Performed by: NURSE ANESTHETIST, CERTIFIED REGISTERED

## 2025-01-24 DEVICE — DEV CLIP ENDO RESOLUTION360 CONTRL ROT 235CM: Type: IMPLANTABLE DEVICE | Site: SIGMOID COLON | Status: FUNCTIONAL

## 2025-01-24 RX ORDER — SODIUM CHLORIDE, SODIUM LACTATE, POTASSIUM CHLORIDE, CALCIUM CHLORIDE 600; 310; 30; 20 MG/100ML; MG/100ML; MG/100ML; MG/100ML
30 INJECTION, SOLUTION INTRAVENOUS CONTINUOUS
Status: DISCONTINUED | OUTPATIENT
Start: 2025-01-24 | End: 2025-01-24 | Stop reason: HOSPADM

## 2025-01-24 RX ORDER — LIDOCAINE HYDROCHLORIDE 20 MG/ML
INJECTION, SOLUTION EPIDURAL; INFILTRATION; INTRACAUDAL; PERINEURAL AS NEEDED
Status: DISCONTINUED | OUTPATIENT
Start: 2025-01-24 | End: 2025-01-24 | Stop reason: SURG

## 2025-01-24 RX ORDER — BUDESONIDE AND FORMOTEROL FUMARATE DIHYDRATE 160; 4.5 UG/1; UG/1
2 AEROSOL RESPIRATORY (INHALATION)
COMMUNITY

## 2025-01-24 RX ORDER — PROPOFOL 10 MG/ML
VIAL (ML) INTRAVENOUS AS NEEDED
Status: DISCONTINUED | OUTPATIENT
Start: 2025-01-24 | End: 2025-01-24 | Stop reason: SURG

## 2025-01-24 RX ORDER — MIDAZOLAM HYDROCHLORIDE 2 MG/2ML
2 INJECTION, SOLUTION INTRAMUSCULAR; INTRAVENOUS ONCE
Status: COMPLETED | OUTPATIENT
Start: 2025-01-24 | End: 2025-01-24

## 2025-01-24 RX ADMIN — GLUCAGON HYDROCHLORIDE 0.5 MG: KIT at 08:27

## 2025-01-24 RX ADMIN — SODIUM CHLORIDE, POTASSIUM CHLORIDE, SODIUM LACTATE AND CALCIUM CHLORIDE 30 ML/HR: 600; 310; 30; 20 INJECTION, SOLUTION INTRAVENOUS at 07:36

## 2025-01-24 RX ADMIN — PROPOFOL 175 MCG/KG/MIN: 10 INJECTION, EMULSION INTRAVENOUS at 08:18

## 2025-01-24 RX ADMIN — PROPOFOL 100 MG: 10 INJECTION, EMULSION INTRAVENOUS at 08:18

## 2025-01-24 RX ADMIN — LIDOCAINE HYDROCHLORIDE 100 MG: 20 INJECTION, SOLUTION EPIDURAL; INFILTRATION; INTRACAUDAL; PERINEURAL at 08:18

## 2025-01-24 RX ADMIN — PROPOFOL 100 MG: 10 INJECTION, EMULSION INTRAVENOUS at 08:24

## 2025-01-24 RX ADMIN — MIDAZOLAM HYDROCHLORIDE 2 MG: 1 INJECTION, SOLUTION INTRAMUSCULAR; INTRAVENOUS at 07:57

## 2025-01-24 NOTE — H&P
Pre Procedure History & Physical    Chief Complaint:   Change in bowel habits and screening    Subjective     HPI:   In bowel habits    Past Medical History:   Past Medical History:   Diagnosis Date    Anxiety and depression     Asthma     Disease of thyroid gland     Family history of colon cancer in mother 10/08/2024    Gastroparesis     H/O: substance abuse     opiates    Melanoma 1992    Migraine     Neuropathy     Osteoporosis     Sarcoidosis     Seasonal allergies        Past Surgical History:  Past Surgical History:   Procedure Laterality Date    ABDOMINAL SURGERY  2000    APPENDECTOMY      BACK SURGERY      lumbar    BRONCHOSCOPY N/A 01/20/2023    Procedure: BRONCHOSCOPY WITH BIOPSIES AND BAL;  Surgeon: Victor M Field DO;  Location: [facility] ENDOSCOPY;  Service: Pulmonary;  Laterality: N/A;  SARCOIDOSIS AND RECURRENT LUNG INFECTIONS    BRONCHOSCOPY N/A 05/02/2023    Procedure: BRONCHOSCOPY WITH BRONCHOALVEOLAR LAVAGE, BIOPSIES, BRUSHING, BRONCHIAL WASHING;  Surgeon: Fawad Christian MD;  Location: [facility] ENDOSCOPY;  Service: Pulmonary;  Laterality: N/A;  RECURRENT PNEUMONIA    BRONCHOSCOPY Bilateral 05/03/2024    Procedure: BRONCHOSCOPY WITH BRONCHIOALVEOLAR LAVAGE;  Surgeon: Victor M Field DO;  Location: [facility] ENDOSCOPY;  Service: Pulmonary;  Laterality: Bilateral;  MYCROBACTERIUM AVIUM    CHOLECYSTECTOMY      COLONOSCOPY      COLONOSCOPY N/A 12/27/2024    Procedure: COLONOSCOPY;  Surgeon: Gabe Ritchie MD;  Location: [facility] ENDOSCOPY;  Service: Gastroenterology;  Laterality: N/A;  incomplete poor prep    ENDOSCOPY N/A 09/27/2024    Procedure: ESOPHAGOGASTRODUODENOSCOPY with biopsy;  Surgeon: Gabe Ritchie MD;  Location: [facility] ENDOSCOPY;  Service: Gastroenterology;  Laterality: N/A;  hiatal hernia, retained food    ENDOSCOPY N/A 12/27/2024    Procedure: ESOPHAGOGASTRODUODENOSCOPY;  Surgeon: Gabe Ritchie MD;  Location: [facility] ENDOSCOPY;  Service: Gastroenterology;   Laterality: N/A;  gastric bx    FACIAL COSMETIC SURGERY      HYSTERECTOMY      SHOULDER SURGERY      spur removal    TONSILLECTOMY AND ADENOIDECTOMY      UPPER GASTROINTESTINAL ENDOSCOPY      12:2024    WISDOM TOOTH EXTRACTION         Family History:  Family History   Problem Relation Age of Onset    Colon cancer Mother 50    Cancer Mother     Cancer Maternal Aunt     Heart disease Maternal Aunt     Saniya Hyperthermia Neg Hx        Social History:   reports that she has never smoked. She has never been exposed to tobacco smoke. She has never used smokeless tobacco. She reports that she does not currently use alcohol. She reports current drug use. Drugs: Hydrocodone, Hydromorphone, and Oxycodone.    Medications:   Medications Prior to Admission   Medication Sig Dispense Refill Last Dose/Taking    albuterol sulfate  (90 Base) MCG/ACT inhaler INHALE TWO PUFFS BY MOUTH EVERY 4 HOURS 8.5 g 11 1/23/2025    amitriptyline (ELAVIL) 100 MG tablet Take 1 tablet by mouth every night at bedtime.   1/23/2025    Benralizumab (Fasenra Pen) 30 MG/ML solution auto-injector Inject 1 mL under the skin into the appropriate area as directed Every 2 (Two) Months. 1 mL 5 Past Week    budesonide-formoterol (SYMBICORT) 160-4.5 MCG/ACT inhaler Inhale 2 puffs 2 (Two) Times a Day.   1/24/2025 Morning    buprenorphine (SUBUTEX) 8 MG sublingual tablet SL tablet Dissolve THREE tablets UNDER THE TONGUE EVERY DAY  0 1/23/2025    clonazePAM (KlonoPIN) 1 MG tablet Take 1 tablet by mouth 2 (Two) Times a Day.  0 1/23/2025    Forteo 600 MCG/2.4ML injection Inject  under the skin into the appropriate area as directed.   1/23/2025    furosemide (LASIX) 20 MG tablet Take 1 tablet by mouth Daily.   Past Week    GNP Calcium Citrate +D3 315-6.25 MG-MCG tablet tablet TAKE ONE TABLET BY MOUTH TWICE DAILY AS DIRECTED   1/23/2025    lamoTRIgine (LaMICtal) 200 MG tablet Take 1 tablet by mouth Daily.   1/23/2025    levothyroxine (SYNTHROID, LEVOTHROID) 25  "MCG tablet Take 1 tablet by mouth Every Morning.   1/23/2025    Linzess 290 MCG capsule capsule Take 1 capsule by mouth Daily.   1/23/2025    ondansetron ODT (ZOFRAN-ODT) 8 MG disintegrating tablet Place 1 tablet on the tongue Every 12 (Twelve) Hours As Needed for Nausea or Vomiting.   1/23/2025    Rimegepant Sulfate (Nurtec) 75 MG tablet dispersible tablet Take 1 tablet by mouth As Needed (Migraine).   Past Week    tiZANidine (ZANAFLEX) 4 MG tablet Take 2 tablets by mouth Every 8 (Eight) Hours As Needed for Muscle Spasms.  5 1/23/2025    fluticasone (Flonase) 50 MCG/ACT nasal spray 2 sprays into the nostril(s) as directed by provider Daily. 1 g 3 More than a month       Allergies:  Naloxone, Tetanus toxoids, Morphine, Pentazocine, and Tyloxapol        Objective     Blood pressure 115/65, pulse 77, temperature 97.8 °F (36.6 °C), temperature source Temporal, resp. rate 16, height 154.9 cm (61\"), weight 63.8 kg (140 lb 10.5 oz), SpO2 94%.    Physical Exam   Constitutional: Pt is oriented to person, place, and time and well-developed, well-nourished, and in no distress.   Mouth/Throat: Oropharynx is clear and moist.   Neck: Normal range of motion.   Cardiovascular: Normal rate, regular rhythm and normal heart sounds.    Pulmonary/Chest: Effort normal and breath sounds normal.   Abdominal: Soft. Nontender  Skin: Skin is warm and dry.   Psychiatric: Mood, memory, affect and judgment normal.     Assessment & Plan     Diagnosis:  Change in bowel habit    Anticipated Surgical Procedure:  Colonoscopy    The risks, benefits, and alternatives of this procedure have been discussed with the patient or the responsible party- the patient understands and agrees to proceed.            "

## 2025-01-24 NOTE — ANESTHESIA POSTPROCEDURE EVALUATION
Patient: Lamar Vee    Procedure Summary       Date: 01/24/25 Room / Location: [facility] ENDOSCOPY 4 / [facility] ENDOSCOPY    Anesthesia Start: 0816 Anesthesia Stop: 0906    Procedure: COLONOSCOPY WITH POLYPECTOMIES HOT AND COLD SNARE, INJECTION ELEVIEW, CLIP PLACEMENT X 1 Diagnosis:       Chronic idiopathic constipation      Encounter for screening for malignant neoplasm of colon      (Chronic idiopathic constipation [K59.04])      (Encounter for screening for malignant neoplasm of colon [Z12.11])    Surgeons: Gabe Ritchie MD Provider: Brian Sosa CRNA    Anesthesia Type: general ASA Status: 3            Anesthesia Type: general    Vitals  Vitals Value Taken Time   /84 01/24/25 0924   Temp 36.3 °C (97.4 °F) 01/24/25 0919   Pulse 71 01/24/25 0930   Resp 12 01/24/25 0924   SpO2 98 % 01/24/25 0930   Vitals shown include unfiled device data.        Post Anesthesia Care and Evaluation    Post-procedure mental status: acceptable.  Pain management: satisfactory to patient    Airway patency: patent  Anesthetic complications: No anesthetic complications    Cardiovascular status: acceptable  Respiratory status: acceptable    Comments: Per chart review

## 2025-02-06 ENCOUNTER — OFFICE VISIT (OUTPATIENT)
Age: 55
End: 2025-02-06
Payer: COMMERCIAL

## 2025-02-06 VITALS
BODY MASS INDEX: 27.38 KG/M2 | DIASTOLIC BLOOD PRESSURE: 69 MMHG | OXYGEN SATURATION: 97 % | HEART RATE: 89 BPM | HEIGHT: 61 IN | SYSTOLIC BLOOD PRESSURE: 131 MMHG | WEIGHT: 145 LBS

## 2025-02-06 DIAGNOSIS — K59.03 DRUG-INDUCED CONSTIPATION: ICD-10-CM

## 2025-02-06 DIAGNOSIS — J43.1 PANLOBULAR EMPHYSEMA: ICD-10-CM

## 2025-02-06 DIAGNOSIS — D86.2 SARCOIDOSIS OF LUNG WITH SARCOIDOSIS OF LYMPH NODES: ICD-10-CM

## 2025-02-06 DIAGNOSIS — F11.21 OPIOID DEPENDENCE IN REMISSION: Primary | ICD-10-CM

## 2025-02-06 DIAGNOSIS — E03.9 PRIMARY HYPOTHYROIDISM: ICD-10-CM

## 2025-02-06 DIAGNOSIS — F43.12 CHRONIC POSTTRAUMATIC STRESS DISORDER: ICD-10-CM

## 2025-02-06 DIAGNOSIS — K21.00 GASTROESOPHAGEAL REFLUX DISEASE WITH ESOPHAGITIS WITHOUT HEMORRHAGE: ICD-10-CM

## 2025-02-06 DIAGNOSIS — K31.84 GASTROPARESIS: ICD-10-CM

## 2025-02-06 RX ORDER — BUPRENORPHINE 8 MG/1
24 TABLET SUBLINGUAL DAILY
Qty: 90 TABLET | Refills: 0 | Status: SHIPPED | OUTPATIENT
Start: 2025-02-06 | End: 2025-02-06 | Stop reason: SDUPTHER

## 2025-02-06 RX ORDER — BUDESONIDE, GLYCOPYRROLATE, AND FORMOTEROL FUMARATE 160; 9; 4.8 UG/1; UG/1; UG/1
2 AEROSOL, METERED RESPIRATORY (INHALATION) 2 TIMES DAILY
COMMUNITY
Start: 2025-01-17

## 2025-02-06 RX ORDER — ESOMEPRAZOLE MAGNESIUM 40 MG/1
40 CAPSULE, DELAYED RELEASE ORAL 2 TIMES DAILY
Qty: 60 CAPSULE | Refills: 5 | Status: SHIPPED | OUTPATIENT
Start: 2025-02-06

## 2025-02-06 RX ORDER — LINACLOTIDE 290 UG/1
290 CAPSULE, GELATIN COATED ORAL DAILY
Qty: 90 CAPSULE | Refills: 1 | Status: SHIPPED | OUTPATIENT
Start: 2025-02-06 | End: 2025-08-05

## 2025-02-06 RX ORDER — LACTULOSE 10 G/15ML
20 SOLUTION ORAL DAILY
Status: SHIPPED | OUTPATIENT
Start: 2025-02-06 | End: 2025-08-05

## 2025-02-06 RX ORDER — BUPRENORPHINE 8 MG/1
24 TABLET SUBLINGUAL DAILY
Qty: 90 TABLET | Refills: 1 | Status: SHIPPED | OUTPATIENT
Start: 2025-02-06

## 2025-02-06 NOTE — PROGRESS NOTES
Office  Note     Patient Name: Lamar Vee  : 1970   MRN: [id number]     Referring Provider: Everett Tapia MD    Chief Complaint: Substance use    History of Present Illness:   HPI    Lamar Vee is a 54 y.o. female who is here today for initial evaluation related to a substance use disorder.    History of drug use she has a history of opiate use disorder for 20 years been treating her for 14 years first family practice at [facility] then at [facility] that back to [facility] and now our fourth site together his at the [facility] addiction clinic.  She has been on 24 mg of pure buprenorphine sublingually per day for the last 10 years at least.  She is allergic to naloxone.    Negative consequences of drug use negative consequences of her drug abuse initially included being unemployable having some minor legal charges she had difficulty maintaining healthy relationships she was either using with her sons or enabling their use while she was codependent with both but she is now she has been sober the last 10 years and she has want a steady job and knows 2 boys are in [employer]    History of drug abuse treatment the only long-term consequence of her drug abuse that she is suffering with is probably some osteoporosis and she still has some opiate induced constipation from her buprenorphine use which is fairly high dose for her weight at 138 pounds.  She has a long list of other chronic problems including sarcoidosis and idiopathic gastroparesis COPD hypothyroidism and osteoporosis which are treated by me at our family [facility].  She is currently being worked up at the mobility clinic to try to figure out a way to diagnose and treat her nausea and vomiting more effectively from the gastroparesis that has resulted in a pound weight loss in the last 2 months.    Social History:  Social History     Socioeconomic History    Marital status:     Number of  children: 2    Highest education level: Associate degree: academic program   Tobacco Use    Smoking status: Never     Passive exposure: Never    Smokeless tobacco: Never   Vaping Use    Vaping status: Never Used   Substance and Sexual Activity    Alcohol use: Not Currently    Drug use: Yes     Types: Hydrocodone, Hydromorphone, Oxycodone     Comment: HX OPIATE ABUSE- 15 YRS AGO    Sexual activity: Yes     Partners: Male     Birth control/protection: None, Hysterectomy       Social History     Social History Narrative    Social History:    Social Support: VJ    Residence: living setting HOME with VJ    Current Employment: [employer], [place]    Education: [employer]REE    Learning Disabilities: NONE    Legal history: NONE    Hobbies/interests: NOTHING    Nondenominational: [employer]    Exercise: SOME    Dietary issues: NONE    Sleep issues: NONE IS ON MEDICATION    Caffeine intake: 2 PEPSI A DAY     history: NONE        Goals for treatment she has reached most of her goals for treatment as stated 12 years ago she still working on being her best version of her self which she understands is a lifelong journey.    Triggers: Triggers currently include mainly when they are her sons get out on parole which has happened before though they screwed up and end up going back to residential fairly promptly.    Cravings: Cravings currently none despite her use of Klonopin 1 mg at bedtime to help manage her chronic PTSD which she gets from a psychiatric nurse practitioner in [place] and the [facility].    Relapse Prevention: Relapse prevention she will start with a therapist here ASAP    UDS Confirmation: Pending    LEWIS (PDMP) Reviewed for Current/Active Medications      Past Surgical History:  Past Surgical History:   Procedure Laterality Date    ABDOMINAL SURGERY  2000    APPENDECTOMY      BACK SURGERY      lumbar    BRONCHOSCOPY N/A 01/20/2023    Procedure: BRONCHOSCOPY WITH BIOPSIES AND BAL;  Surgeon:  Victor M Field DO;  Location: [facility] ENDOSCOPY;  Service: Pulmonary;  Laterality: N/A;  SARCOIDOSIS AND RECURRENT LUNG INFECTIONS    BRONCHOSCOPY N/A 05/02/2023    Procedure: BRONCHOSCOPY WITH BRONCHOALVEOLAR LAVAGE, BIOPSIES, BRUSHING, BRONCHIAL WASHING;  Surgeon: Fawad Christian MD;  Location: [facility] ENDOSCOPY;  Service: Pulmonary;  Laterality: N/A;  RECURRENT PNEUMONIA    BRONCHOSCOPY Bilateral 05/03/2024    Procedure: BRONCHOSCOPY WITH BRONCHIOALVEOLAR LAVAGE;  Surgeon: Victor M Field DO;  Location: [facility] ENDOSCOPY;  Service: Pulmonary;  Laterality: Bilateral;  MYCROBACTERIUM AVIUM    CHOLECYSTECTOMY      COLONOSCOPY      COLONOSCOPY N/A 12/27/2024    Procedure: COLONOSCOPY;  Surgeon: Gabe Ritchie MD;  Location: [facility] ENDOSCOPY;  Service: Gastroenterology;  Laterality: N/A;  incomplete poor prep    COLONOSCOPY N/A 1/24/2025    Procedure: COLONOSCOPY WITH POLYPECTOMIES HOT AND COLD SNARE, INJECTION ELEVIEW, CLIP PLACEMENT X 1;  Surgeon: Gabe Ritchie MD;  Location: [facility] ENDOSCOPY;  Service: Gastroenterology;  Laterality: N/A;  COLON POLYPS    ENDOSCOPY N/A 09/27/2024    Procedure: ESOPHAGOGASTRODUODENOSCOPY with biopsy;  Surgeon: Gabe Ritchie MD;  Location: [facility] ENDOSCOPY;  Service: Gastroenterology;  Laterality: N/A;  hiatal hernia, retained food    ENDOSCOPY N/A 12/27/2024    Procedure: ESOPHAGOGASTRODUODENOSCOPY;  Surgeon: Gabe Ritchie MD;  Location: [facility] ENDOSCOPY;  Service: Gastroenterology;  Laterality: N/A;  gastric bx    FACIAL COSMETIC SURGERY      HYSTERECTOMY      SHOULDER SURGERY      spur removal    TONSILLECTOMY AND ADENOIDECTOMY      UPPER GASTROINTESTINAL ENDOSCOPY      12:2024    WISDOM TOOTH EXTRACTION         Problem List:  Patient Active Problem List   Diagnosis    Right knee pain    Patellar instability of right knee    Pulmonary infiltrate    Eosinophilic asthma    Pulmonary sarcoidosis    Pneumonia    Mycobacterium avium complex     Chest congestion    Severe persistent asthma    Gastroesophageal reflux disease    Family history of colon cancer in mother    Chronic idiopathic constipation    Encounter for screening for malignant neoplasm of colon       Allergy:   Allergies   Allergen Reactions    Naloxone Hives    Oxycodone-Acetaminophen Hives    Tetanus Toxoids Unknown - Low Severity     CHILDHOOD REACTION      Morphine Headache    Pentazocine Hives    Tyloxapol Hives        Current Medications:   Current Outpatient Medications   Medication Sig Dispense Refill    albuterol sulfate  (90 Base) MCG/ACT inhaler INHALE TWO PUFFS BY MOUTH EVERY 4 HOURS 8.5 g 11    amitriptyline (ELAVIL) 100 MG tablet Take 1 tablet by mouth every night at bedtime.      Benralizumab (Fasenra Pen) 30 MG/ML solution auto-injector Inject 1 mL under the skin into the appropriate area as directed Every 2 (Two) Months. 1 mL 5    Breztri Aerosphere 160-9-4.8 MCG/ACT aerosol inhaler Inhale 2 puffs 2 (Two) Times a Day.      budesonide-formoterol (SYMBICORT) 160-4.5 MCG/ACT inhaler Inhale 2 puffs 2 (Two) Times a Day.      buprenorphine (SUBUTEX) 8 MG sublingual tablet SL tablet Place 3 tablets under the tongue Daily. 90 tablet 1    clonazePAM (KlonoPIN) 1 MG tablet Take 1 tablet by mouth 2 (Two) Times a Day.  0    fluticasone (Flonase) 50 MCG/ACT nasal spray 2 sprays into the nostril(s) as directed by provider Daily. 1 g 3    Forteo 600 MCG/2.4ML injection Inject  under the skin into the appropriate area as directed.      furosemide (LASIX) 20 MG tablet Take 1 tablet by mouth Daily.      GNP Calcium Citrate +D3 315-6.25 MG-MCG tablet tablet TAKE ONE TABLET BY MOUTH TWICE DAILY AS DIRECTED      lamoTRIgine (LaMICtal) 200 MG tablet Take 1 tablet by mouth Daily.      levothyroxine (SYNTHROID, LEVOTHROID) 25 MCG tablet Take 1 tablet by mouth Every Morning.      Linzess 290 MCG capsule capsule Take 1 capsule by mouth Daily for 180 days. 90 capsule 1    ondansetron ODT  (ZOFRAN-ODT) 8 MG disintegrating tablet Place 1 tablet on the tongue Every 12 (Twelve) Hours As Needed for Nausea or Vomiting.      Rimegepant Sulfate (Nurtec) 75 MG tablet dispersible tablet Take 1 tablet by mouth As Needed (Migraine).      tiZANidine (ZANAFLEX) 4 MG tablet Take 2 tablets by mouth Every 8 (Eight) Hours As Needed for Muscle Spasms.  5    esomeprazole (nexIUM) 40 MG capsule Take 1 capsule by mouth 2 (Two) Times a Day. 60 capsule 5     Current Facility-Administered Medications   Medication Dose Route Frequency Provider Last Rate Last Admin    lactulose (CHRONULAC) 10 GM/15ML solution 20 g  20 g Oral Daily Everett Tapia MD           Past Medical History:  Past Medical History:   Diagnosis Date    Anxiety and depression     Asthma     Disease of thyroid gland     Family history of colon cancer in mother 10/08/2024    Gastroparesis     H/O: substance abuse     opiates    Melanoma 1992    Migraine     Neuropathy     Osteoporosis     Sarcoidosis     Seasonal allergies        Family History:  Family History   Problem Relation Age of Onset    Colon cancer Mother 50    Cancer Mother     Cancer Maternal Aunt     Heart disease Maternal Aunt     Malig Hyperthermia Neg Hx          Subjective      Review of Systems:   Review of Systems    PHQ-9 Score:       TIARRA-7 Score:   Feeling nervous, anxious or on edge: More than half the days  Not being able to stop or control worrying: Not at all  Worrying too much about different things: More than half the days  Trouble Relaxing: More than half the days  Being so restless that it is hard to sit still: Not at all  Feeling afraid as if something awful might happen: Not at all  Becoming easily annoyed or irritable: More than half the days  TIARRA 7 Total Score: 8  If you checked any problems, how difficult have these problems made it for you to do your work, take care of things at home, or get along with other people: Not difficult at all    Patient History:   The following  portions of the patient's history were reviewed and updated as appropriate: allergies, current medications, past family history, past medical history, past social history, past surgical history and problem list.     Medications:     Current Outpatient Medications:     albuterol sulfate  (90 Base) MCG/ACT inhaler, INHALE TWO PUFFS BY MOUTH EVERY 4 HOURS, Disp: 8.5 g, Rfl: 11    amitriptyline (ELAVIL) 100 MG tablet, Take 1 tablet by mouth every night at bedtime., Disp: , Rfl:     Benralizumab (Fasenra Pen) 30 MG/ML solution auto-injector, Inject 1 mL under the skin into the appropriate area as directed Every 2 (Two) Months., Disp: 1 mL, Rfl: 5    Breztri Aerosphere 160-9-4.8 MCG/ACT aerosol inhaler, Inhale 2 puffs 2 (Two) Times a Day., Disp: , Rfl:     budesonide-formoterol (SYMBICORT) 160-4.5 MCG/ACT inhaler, Inhale 2 puffs 2 (Two) Times a Day., Disp: , Rfl:     buprenorphine (SUBUTEX) 8 MG sublingual tablet SL tablet, Place 3 tablets under the tongue Daily., Disp: 90 tablet, Rfl: 1    clonazePAM (KlonoPIN) 1 MG tablet, Take 1 tablet by mouth 2 (Two) Times a Day., Disp: , Rfl: 0    fluticasone (Flonase) 50 MCG/ACT nasal spray, 2 sprays into the nostril(s) as directed by provider Daily., Disp: 1 g, Rfl: 3    Forteo 600 MCG/2.4ML injection, Inject  under the skin into the appropriate area as directed., Disp: , Rfl:     furosemide (LASIX) 20 MG tablet, Take 1 tablet by mouth Daily., Disp: , Rfl:     GNP Calcium Citrate +D3 315-6.25 MG-MCG tablet tablet, TAKE ONE TABLET BY MOUTH TWICE DAILY AS DIRECTED, Disp: , Rfl:     lamoTRIgine (LaMICtal) 200 MG tablet, Take 1 tablet by mouth Daily., Disp: , Rfl:     levothyroxine (SYNTHROID, LEVOTHROID) 25 MCG tablet, Take 1 tablet by mouth Every Morning., Disp: , Rfl:     Linzess 290 MCG capsule capsule, Take 1 capsule by mouth Daily for 180 days., Disp: 90 capsule, Rfl: 1    ondansetron ODT (ZOFRAN-ODT) 8 MG disintegrating tablet, Place 1 tablet on the tongue Every 12  "(Twelve) Hours As Needed for Nausea or Vomiting., Disp: , Rfl:     Rimegepant Sulfate (Nurtec) 75 MG tablet dispersible tablet, Take 1 tablet by mouth As Needed (Migraine)., Disp: , Rfl:     tiZANidine (ZANAFLEX) 4 MG tablet, Take 2 tablets by mouth Every 8 (Eight) Hours As Needed for Muscle Spasms., Disp: , Rfl: 5    esomeprazole (nexIUM) 40 MG capsule, Take 1 capsule by mouth 2 (Two) Times a Day., Disp: 60 capsule, Rfl: 5    Current Facility-Administered Medications:     lactulose (CHRONULAC) 10 GM/15ML solution 20 g, 20 g, Oral, Daily, Everett Tapia MD    Objective     Physical Exam:  Physical Exam    Vital Signs:   Vitals:    02/06/25 0811   BP: 131/69   Pulse: 89   SpO2: 97%   Weight: 65.8 kg (145 lb)   Height: 154.9 cm (61\")       Lab Results (last 24 hours)       ** No results found for the last 24 hours. **            Pill count: Refill due today    Body mass index is 27.4 kg/m².     MENTAL STATUS EXAM   General Appearance:  Cleanly groomed and dressed  Eye Contact:  Good eye contact  Attitude:  Cooperative  Motor Activity:  Normal gait, posture  Muscle Strength:  Normal  Speech:  Normal rate, tone, volume  Language:  Spontaneous  Mood and affect:  Normal, pleasant  Hopelessness:  Denies  Loneliness: Denies  Thought Process:  Logical  Hallucinations:  None  Suicidal Ideations:  Not present  Homicidal Ideation:  Not present  Sensorium:  Alert        Assessment / Plan      Diagnoses and all orders for this visit:    1. Opioid dependence in remission (Primary)  -     [facility] MAT OF - Saliva, Oral Cavity; Future  -     Discontinue: buprenorphine (SUBUTEX) 8 MG sublingual tablet SL tablet; Place 3 tablets under the tongue Daily.  Dispense: 90 tablet; Refill: 0  -     buprenorphine (SUBUTEX) 8 MG sublingual tablet SL tablet; Place 3 tablets under the tongue Daily.  Dispense: 90 tablet; Refill: 1    2. Gastroparesis    3. Panlobular emphysema    4. Chronic posttraumatic stress disorder    5. " Drug-induced constipation  -     Linzess 290 MCG capsule capsule; Take 1 capsule by mouth Daily for 180 days.  Dispense: 90 capsule; Refill: 1  -     lactulose (CHRONULAC) 10 GM/15ML solution 20 g    6. Gastroesophageal reflux disease with esophagitis without hemorrhage  -     esomeprazole (nexIUM) 40 MG capsule; Take 1 capsule by mouth 2 (Two) Times a Day.  Dispense: 60 capsule; Refill: 5    7. Primary hypothyroidism    8. Sarcoidosis of lung with sarcoidosis of lymph nodes           PLAN:  Safety: No acute safety concerns  Risk Assessment: Risk of self-harm acutely is low. Risk of self-harm chronically is also low, but could be further elevated in the event of treatment noncompliance and/or AODA.    TREATMENT PLAN/GOALS: Initiate supportive psychotherapy efforts and medications as indicated. Treatment and medication options discussed during today's visit. Patient acknowledged and verbally consented to begin the agreed upon treatment plan and was educated on the importance of compliance with treatment and follow-up appointments.  Patient has reviewed and signed their informed consent agreement and their controlled substances contract.  They have also received a copy of the controlled substances contract.    MEDICATION ISSUES:  LEWIS reviewed as expected.  Discussed medication options and treatment plan of prescribed medication as well as the risks, benefits, and side effects including potential falls, possible impaired driving and metabolic adversities among others. Patient is agreeable to call the office with any worsening of symptoms or onset of side effects. Patient is agreeable to call 911 or go to the nearest ER should he/she begin having SI/HI.    Return in about 2 months (around 4/6/2025) for Start therapy..             This document has been electronically signed by Everett Tapia MD  February 6, 2025 09:22 EST      Part of this note may be an electronic transcription/translation of spoken language to  printed text using the Dragon Dictation System.

## 2025-02-07 ENCOUNTER — TELEMEDICINE (OUTPATIENT)
Age: 55
End: 2025-02-07
Payer: COMMERCIAL

## 2025-02-07 ENCOUNTER — SPECIALTY PHARMACY (OUTPATIENT)
Dept: GASTROENTEROLOGY | Facility: HOSPITAL | Age: 55
End: 2025-02-07
Payer: COMMERCIAL

## 2025-02-07 DIAGNOSIS — F43.10 POST TRAUMATIC STRESS DISORDER (PTSD): ICD-10-CM

## 2025-02-07 DIAGNOSIS — F11.21 OPIOID DEPENDENCE IN REMISSION: Primary | ICD-10-CM

## 2025-02-07 NOTE — PROGRESS NOTES
Fasenra PA approved thru 2/7/26. Patient must fill thru [facility] per insurance, Rx has already been sent there. Specialty pharmacy will follow for additional PA needs.     Nadja Cohen, PharmD  Specialty Pharmacist  Pulmonology/Gastroenterology    [phone]

## 2025-02-07 NOTE — PROGRESS NOTES
Time In: 10:46AM EST   Time out: 11:43 AM EST  Name of PCP: Everett Tapia MD   Referral source: No referring provider defined for this encounter.    Subjective   Lamar Vee is a 54 y.o. female who presents today for initial evaluation      Chief Complaint: Substance Abuse & PTSD  Patient has a complex medical and psychiatric history, including a history of substance use and significant trauma.    History of Present Illness:   The patient has a history of substance use, starting with Lortab approximately 15 years ago, followed by oxycodone and Opana. She reports no history of smoking or alcohol use, except for social drinking in her late 20s and early 30s. Her substance use history includes minimal heroin use, but she reports no use of fentanyl or other substances. She has received treatment from various providers, including Dr. Tapia,Mr. Bernal, and Ms. Monsivais (psychotherapist). She has also undergone inpatient treatment at [facility]  for mental health issues. She has a history of trauma but is not comfortable discussing it in detail at this time. She has a history of opioid overdose, which required an 11-day hospital stay and ventilation. She has been on Suboxone since starting treatment with Dr. Tapia and reports no cravings. She has a history of legal issues, including charges for possession and shoplifting, but no DUIs. She reports no current cravings or triggers for substance use.    She has a known diagnosis of sarcoidosis and has undergone testing for a diverticulum and gastroparesis. She has also been diagnosed with bone density issues. She is scheduled for a gastro-stimulator procedure. She was prescribed medication for her stomach issues but discontinued it due to adverse effects. She has informed her healthcare providers about this. She has a history of hysterectomy, tonsillectomy, cholecystectomy, and appendectomy. 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 She has 2 adult sons, one of whom is incarcerated, and 4 grandchildren. She has been in a relationship with her fiance for 4 to 5 years and lives with him and her son's ex-girlfriend. She reports feeling stressed about her upcoming procedure and has experienced constipation and blood in her stool. She underwent a colonoscopy on 01/27/2025, which revealed no polyps. She had another colonoscopy on 01/17/2025, which revealed a 6 mm polyp. She has had blood work done and has been advised to report any dizziness. She has been off work for a few days due to illness. She has a strained relationship with her mother and reports feeling depressed. She has tried various treatments for her constipation, including MiraLAX, but has not had a bowel movement since last Saturday. She reports poor sleep and takes tizanidine, Klonopin, and melatonin to aid sleep. She reports no appetite and has to force herself to eat. She reports no thoughts of self-harm or suicidal ideation. She reports feeling anxious and easily irritated, particularly at home. She has seen Dr. Briscoe via [place] and has had blood work ordered. She has not yet heard from the doctor's office and has left a message. She has discussed short-term disability with Dr. Tapia  and her other doctors. She reports feeling isolated at home and reports that her fiance does not understand her needs. She reports that her health is her biggest concern and stressor.          History of Present Illness        Patient adamantly and convincingly denies current suicidal or homicidal ideation or perceptual disturbance.     Childhood Experiences:   Has patient experienced a major accident or tragic events as a child? No    Has patient experienced any other significant life events or trauma (such as verbal, physical, sexual abuse)?  Patient does endorse a history of childhood trauma for does not indicate his desire to discuss during intake  this will be explored in further sessions    Significant Life Events:  Patient's oldest son is currently incarcerated.  She has 4 grandchildren but does not have contact with them not by choice, she has made multiple attempts to develop a relationship with them but has been unsuccessful.  Patient was raised by her grandparents, and has had a lifelong strained relationship with her mother.    Has patient experienced a death / loss of relationship?  The patient experienced significant trauma when her spouse  by suicide in  in her presence and continues to feel blamed by her spouse's family.  Patient has a strained relationship with her mother and sons that cause significant stress.  Patient is struggling with depression and anxiety related to her physical health.    Social History:   Social History     Socioeconomic History    Marital status:     Number of children: 2    Highest education level: Associate degree: academic program   Tobacco Use    Smoking status: Never     Passive exposure: Never    Smokeless tobacco: Never   Vaping Use    Vaping status: Never Used   Substance and Sexual Activity    Alcohol use: Not Currently    Drug use: Yes     Types: Hydrocodone, Hydromorphone, Oxycodone     Comment: HX OPIATE ABUSE- 15 YRS AGO    Sexual activity: Yes     Partners: Male     Birth control/protection: None, Hysterectomy       Marital Status:  Patient is engaged in a committed relationship    Patient's current living situation: Patient lives with s/o    Support system: friends and coworkers    Difficulty getting along with peers: no    Difficulty making new friendships: no    Difficulty maintaining friendships: no    Close with family members: no    Religous: NA    Work History:  Highest level of education obtained: 12th grade    Ever been active duty in the ? no    Patient's Occupation: CNA    Describe patient's current and past work experience: Patient is a nurse's aide and has been working at  the same facility for 7 to 8 years.  She received multiple raises and has a good relationship with her boss.  She is concerned about having to take off time from work for upcoming medical procedures.  Legal History:  The patient has past legal issues involving selling prescription medication, which occurred approximately 14 years ago and is no longer on her record.  She also endorses a shoplifting incident at a retail store 3 to 4 years ago.   Past Medical History:  Past Medical History:   Diagnosis Date    Anxiety and depression     Asthma     Disease of thyroid gland     Family history of colon cancer in mother 10/08/2024    Gastroparesis     H/O: substance abuse     opiates    Melanoma 1992    Migraine     Neuropathy     Osteoporosis     Sarcoidosis     Seasonal allergies        Past Surgical History:  Past Surgical History:   Procedure Laterality Date    ABDOMINAL SURGERY  2000    APPENDECTOMY      BACK SURGERY      lumbar    BRONCHOSCOPY N/A 01/20/2023    Procedure: BRONCHOSCOPY WITH BIOPSIES AND BAL;  Surgeon: Victor M Field DO;  Location: [facility] ENDOSCOPY;  Service: Pulmonary;  Laterality: N/A;  SARCOIDOSIS AND RECURRENT LUNG INFECTIONS    BRONCHOSCOPY N/A 05/02/2023    Procedure: BRONCHOSCOPY WITH BRONCHOALVEOLAR LAVAGE, BIOPSIES, BRUSHING, BRONCHIAL WASHING;  Surgeon: Fawad Christian MD;  Location: [facility] ENDOSCOPY;  Service: Pulmonary;  Laterality: N/A;  RECURRENT PNEUMONIA    BRONCHOSCOPY Bilateral 05/03/2024    Procedure: BRONCHOSCOPY WITH BRONCHIOALVEOLAR LAVAGE;  Surgeon: Victor M Field DO;  Location: [facility] ENDOSCOPY;  Service: Pulmonary;  Laterality: Bilateral;  MYCROBACTERIUM AVIUM    CHOLECYSTECTOMY      COLONOSCOPY      COLONOSCOPY N/A 12/27/2024    Procedure: COLONOSCOPY;  Surgeon: Gabe Ritchie MD;  Location: [facility] ENDOSCOPY;  Service: Gastroenterology;  Laterality: N/A;  incomplete poor prep    COLONOSCOPY N/A 1/24/2025    Procedure: COLONOSCOPY WITH  POLYPECTOMIES HOT AND COLD SNARE, INJECTION ELEVIEW, CLIP PLACEMENT X 1;  Surgeon: Gabe Ritchie MD;  Location: [facility] ENDOSCOPY;  Service: Gastroenterology;  Laterality: N/A;  COLON POLYPS    ENDOSCOPY N/A 09/27/2024    Procedure: ESOPHAGOGASTRODUODENOSCOPY with biopsy;  Surgeon: Gabe Ritchie MD;  Location: [facility] ENDOSCOPY;  Service: Gastroenterology;  Laterality: N/A;  hiatal hernia, retained food    ENDOSCOPY N/A 12/27/2024    Procedure: ESOPHAGOGASTRODUODENOSCOPY;  Surgeon: Gabe Ritchie MD;  Location: [facility] ENDOSCOPY;  Service: Gastroenterology;  Laterality: N/A;  gastric bx    FACIAL COSMETIC SURGERY      HYSTERECTOMY      SHOULDER SURGERY      spur removal    TONSILLECTOMY AND ADENOIDECTOMY      UPPER GASTROINTESTINAL ENDOSCOPY      12:2024    WISDOM TOOTH EXTRACTION         Physical Exam:   There were no vitals taken for this visit. There is no height or weight on file to calculate BMI.     History of psychiatric treatment or hospitalization: Yes, describe: The patient reported a past inpatient hospitalization at [facility] to address mental health and substance use concerns.    Allergy:   Allergies   Allergen Reactions    Naloxone Hives    Oxycodone-Acetaminophen Hives    Tetanus Toxoids Unknown - Low Severity     CHILDHOOD REACTION      Morphine Headache    Pentazocine Hives    Tyloxapol Hives        Current Medications:   Current Outpatient Medications   Medication Sig Dispense Refill    albuterol sulfate  (90 Base) MCG/ACT inhaler INHALE TWO PUFFS BY MOUTH EVERY 4 HOURS 8.5 g 11    amitriptyline (ELAVIL) 100 MG tablet Take 1 tablet by mouth every night at bedtime.      Benralizumab (Fasenra Pen) 30 MG/ML solution auto-injector Inject 1 mL under the skin into the appropriate area as directed Every 2 (Two) Months. 1 mL 5    Breztri Aerosphere 160-9-4.8 MCG/ACT aerosol inhaler Inhale 2 puffs 2 (Two) Times a Day.      budesonide-formoterol (SYMBICORT) 160-4.5 MCG/ACT  inhaler Inhale 2 puffs 2 (Two) Times a Day.      buprenorphine (SUBUTEX) 8 MG sublingual tablet SL tablet Place 3 tablets under the tongue Daily. 90 tablet 1    clonazePAM (KlonoPIN) 1 MG tablet Take 1 tablet by mouth 2 (Two) Times a Day.  0    esomeprazole (nexIUM) 40 MG capsule Take 1 capsule by mouth 2 (Two) Times a Day. 60 capsule 5    fluticasone (Flonase) 50 MCG/ACT nasal spray 2 sprays into the nostril(s) as directed by provider Daily. 1 g 3    Forteo 600 MCG/2.4ML injection Inject  under the skin into the appropriate area as directed.      furosemide (LASIX) 20 MG tablet Take 1 tablet by mouth Daily.      GNP Calcium Citrate +D3 315-6.25 MG-MCG tablet tablet TAKE ONE TABLET BY MOUTH TWICE DAILY AS DIRECTED      lamoTRIgine (LaMICtal) 200 MG tablet Take 1 tablet by mouth Daily.      levothyroxine (SYNTHROID, LEVOTHROID) 25 MCG tablet Take 1 tablet by mouth Every Morning.      Linzess 290 MCG capsule capsule Take 1 capsule by mouth Daily for 180 days. 90 capsule 1    ondansetron ODT (ZOFRAN-ODT) 8 MG disintegrating tablet Place 1 tablet on the tongue Every 12 (Twelve) Hours As Needed for Nausea or Vomiting.      Rimegepant Sulfate (Nurtec) 75 MG tablet dispersible tablet Take 1 tablet by mouth As Needed (Migraine).      tiZANidine (ZANAFLEX) 4 MG tablet Take 2 tablets by mouth Every 8 (Eight) Hours As Needed for Muscle Spasms.  5     Current Facility-Administered Medications   Medication Dose Route Frequency Provider Last Rate Last Admin    lactulose (CHRONULAC) 10 GM/15ML solution 20 g  20 g Oral Daily Everett Tapia MD           Family History:  Family History   Problem Relation Age of Onset    Colon cancer Mother 50    Cancer Mother     Cancer Maternal Aunt     Heart disease Maternal Aunt     Malig Hyperthermia Neg Hx        Problem List:  Patient Active Problem List   Diagnosis    Right knee pain    Patellar instability of right knee    Pulmonary infiltrate    Eosinophilic asthma    Pulmonary  "sarcoidosis    Pneumonia    Mycobacterium avium complex    Chest congestion    Severe persistent asthma    Gastroesophageal reflux disease    Family history of colon cancer in mother    Chronic idiopathic constipation    Encounter for screening for malignant neoplasm of colon         History of Substance Use:   Patient answered YES to experiencing two or more of the following problems related to substance use: using more than intended or over longer period than intended; difficulty quitting or cutting back use; spending a great deal of time obtaining, using, or recovering from using; craving or strong desire or urge to use;  work and/or school problems; financial problems; family problems; using in dangerous situations; physical or mental health problems; relapse; feelings of guilt or remorse about use; times when used and/or drank alone; needing to use more in order to achieve the desired effect; illness or withdrawal when stopping or cutting back use; using to relieve or avoid getting ill or developing withdrawal symptoms; and black outs and/or memory issues when using.     The patient has a history of opioid use beginning with prescribed opioids approximately 11 years ago.  This progressed to oxycodone use and eventually heroin use when oxycodone became unavailable.  She reports her heroin use was \"not a lot\" and primarily occurred when she could not obtain other opioids.  She reports a remote history of social alcohol use in her 20s and early 30s but denies current alcohol use.  She reports a past overdose approximately 20 years ago involving a mixture of hydrocodone Xanax and Klonopin.  The overdose resulted in hospitalization.  The patient is currently prescribed buprenorphine for her opioid use disorder and reports no illicit opioid use since beginning treatment with Dr. Tapia.  She also reports past treatment in the 1990s, however she cannot recall specifics.  She has seen multiple therapists, including an " outpatient treatment episode at [facility] during 2022/2024.  The patient appears committed to her recovery and expresses no cravings for illicit substances.  She is engaged in therapy and compliant with her MAT.        PHQ-Score Total:  PHQ-9 Total Score: 8    TIARRA-7 Score Total:  Over the last two weeks, how often have you been bothered by the following problems?  Feeling nervous, anxious or on edge: More than half the days  Not being able to stop or control worrying: Nearly every day  Worrying too much about different things: More than half the days  Trouble Relaxing: Not at all  Being so restless that it is hard to sit still: Not at all  Becoming easily annoyed or irritable: Not at all  Feeling afraid as if something awful might happen: Not at all  TIARRA 7 Total Score: 7  If you checked any problems, how difficult have these problems made it for you to do your work, take care of things at home, or get along with other people: Somewhat difficult    MENTAL STATUS EXAM   General Appearance:  Cleanly groomed and dressed  Eye Contact:  Good eye contact  Attitude:  Cooperative  Motor Activity:  Normal gait, posture  Muscle Strength:  Normal  Speech:  Normal rate, tone, volume  Mood and affect:  Normal, pleasant  Hopelessness:  Denies  Loneliness: Denies  Thought Process:  Logical  Associations/ Thought Content:  No delusions  Hallucinations:  None  Suicidal Ideations:  Not present  Homicidal Ideation:  Not present  Sensorium:  Alert  Orientation:  Person, place, time and situation  Immediate Recall, Recent, and Remote Memory:  Intact  Attention Span/ Concentration:  Good  Fund of Knowledge:  Appropriate for age and educational level  Intellectual Functioning:  Average range  Insight:  Good  Judgement:  Good  Reliability:  Good  Impulse Control:  Good       Impression/Formulation:  Patient appeared alert and oriented.  Patient is voluntarily requesting to begin outpatient therapy at [facility].  Patient is receptive to assistance with maintaining a stable lifestyle.  Patient presents with history of OUD & PTSD.   Patient is agreeable to attend routine therapy sessions.  Patient expressed desire to maintain stability and participate in the therapeutic process.      Assessment and Plan: Patient has a complex medical and psychiatric history, including substance use disorder primarily involving opioids, and significant psychosocial stressors.  She has a history of trauma including witnessing her Suicide experiencing personal physical health issues undergoing multiple surgeries.  Patient endorses feelings of depression exhibiting persistent sadness, loss of interest in activities, and fatigue.  Additionally she describes excessive anxiety and worry about several events, particularly her health and family issues.  Patient experiences ongoing symptoms such as flashbacks and avoidance related to the trauma.      Assessment & Plan        Visit Diagnoses:    ICD-10-CM ICD-9-CM   1. Opioid dependence in remission  F11.21 304.03   2. Post traumatic stress disorder (PTSD)  F43.10 309.81        Prognosis: Good with Ongoing Treatment     Return in 7 weeks (on 3/28/2025) for Next scheduled follow up. (Due to upcoming medical procedures).    Patient's goals include achieving stabilization of physical health issues, maintain sobriety, and manage psychological stressors effectively.  Supportive psychotherapy to continue focusing on coping mechanisms for trauma and stress management relapse prevention as well as continuing current medication regimen as prescribed by Dr. Poon.  We will monitor and adjust therapy goals as needed.    Treatment Plan: Continue supportive psychotherapy efforts and medications as indicated. Obtain release of information for current treatment team for continuity of care as needed. Patient will adhere to medication regimen as prescribed and report any side effects. Patient  will contact this office, call 911 or present to the nearest emergency room should suicidal or homicidal ideations occur.    Short Term Goals: Patient will be compliant with medication, and patient will have no significant medication related side effects.  Patient will be engaged in psychotherapy as indicated.  Patient will report subjective improvement of symptoms.    Long Term Goals: To stabilize mood and treat/improve subjective symptoms, the patient will stay out of the hospital, the patient will be at an optimal level of functioning, and the patient will take all medications as prescribed.The patient verbalized understanding and agreement with goals that were mutually set.    Crisis Plan:  If symptoms/behaviors persist, patient will present to the nearest hospital for an assessment. Advised patient of [facility] 24/7 assessment services.            This document has been electronically signed by GIOVANY Mckeon  February 7, 2025 11:49 EST     Part of this note may be an electronic transcription/translation of spoken language to printed text using the Dragon Dictation System.     Patient or patient representative verbalized consent for the use of Ambient Listening during the visit with  GIOVANY Mckeon for chart documentation. 2/7/2025  17:52 EST

## 2025-02-11 ENCOUNTER — OFFICE VISIT (OUTPATIENT)
Dept: PULMONOLOGY | Facility: CLINIC | Age: 55
End: 2025-02-11
Payer: COMMERCIAL

## 2025-02-11 ENCOUNTER — TELEPHONE (OUTPATIENT)
Dept: PULMONOLOGY | Facility: CLINIC | Age: 55
End: 2025-02-11

## 2025-02-11 VITALS
DIASTOLIC BLOOD PRESSURE: 85 MMHG | HEART RATE: 82 BPM | WEIGHT: 145 LBS | OXYGEN SATURATION: 95 % | BODY MASS INDEX: 27.38 KG/M2 | RESPIRATION RATE: 16 BRPM | TEMPERATURE: 97.9 F | HEIGHT: 61 IN | SYSTOLIC BLOOD PRESSURE: 108 MMHG

## 2025-02-11 DIAGNOSIS — J82.83 EOSINOPHILIC ASTHMA: Chronic | ICD-10-CM

## 2025-02-11 DIAGNOSIS — D86.0 PULMONARY SARCOIDOSIS: Chronic | ICD-10-CM

## 2025-02-11 DIAGNOSIS — R06.2 WHEEZING: ICD-10-CM

## 2025-02-11 DIAGNOSIS — R05.1 ACUTE COUGH: ICD-10-CM

## 2025-02-11 DIAGNOSIS — R53.83 OTHER FATIGUE: ICD-10-CM

## 2025-02-11 DIAGNOSIS — J45.50 SEVERE PERSISTENT ASTHMA WITHOUT COMPLICATION: Primary | Chronic | ICD-10-CM

## 2025-02-11 DIAGNOSIS — F11.21 OPIOID DEPENDENCE IN REMISSION: ICD-10-CM

## 2025-02-11 RX ORDER — DEXTROMETHORPHAN HYDROBROMIDE AND PROMETHAZINE HYDROCHLORIDE 15; 6.25 MG/5ML; MG/5ML
SYRUP ORAL
COMMUNITY

## 2025-02-11 RX ORDER — POLYETHYLENE GLYCOL 3350 17 G/17G
POWDER, FOR SOLUTION ORAL
COMMUNITY
Start: 2025-02-10

## 2025-02-11 RX ORDER — IBUPROFEN 600 MG/1
TABLET, FILM COATED ORAL
COMMUNITY

## 2025-02-11 RX ORDER — IPRATROPIUM BROMIDE AND ALBUTEROL SULFATE 2.5; .5 MG/3ML; MG/3ML
SOLUTION RESPIRATORY (INHALATION)
COMMUNITY

## 2025-02-11 RX ORDER — PREDNISONE 20 MG/1
20 TABLET ORAL DAILY
Qty: 7 TABLET | Refills: 0 | Status: SHIPPED | OUTPATIENT
Start: 2025-02-11

## 2025-02-11 RX ORDER — LEVOFLOXACIN 750 MG/1
750 TABLET, FILM COATED ORAL DAILY
Qty: 7 TABLET | Refills: 0 | Status: SHIPPED | OUTPATIENT
Start: 2025-02-11

## 2025-02-11 RX ORDER — POLYETHYLENE GLYCOL 3350, SODIUM CHLORIDE, SODIUM BICARBONATE, POTASSIUM CHLORIDE 420; 11.2; 5.72; 1.48 G/4L; G/4L; G/4L; G/4L
POWDER, FOR SOLUTION ORAL
COMMUNITY

## 2025-02-11 RX ORDER — METHYLPREDNISOLONE SODIUM SUCCINATE 125 MG/2ML
125 INJECTION INTRAMUSCULAR; INTRAVENOUS ONCE
Status: COMPLETED | OUTPATIENT
Start: 2025-02-11 | End: 2025-02-11

## 2025-02-11 RX ADMIN — METHYLPREDNISOLONE SODIUM SUCCINATE 125 MG: 125 INJECTION INTRAMUSCULAR; INTRAVENOUS at 08:51

## 2025-02-11 NOTE — PROGRESS NOTES
Primary Care Provider  Everett Tapia MD     Referring Provider  No ref. provider found       Patient or patient representative verbalized consent for the use of Ambient Listening during the visit with  OSMAN Mcfadden for chart documentation. 2/11/2025  08:35 EST    Chief Complaint  Asthma, Shortness of Breath, Wheezing, Cough (Not able to cough anything up ), and Acute Visit (X 1 month )    Subjective          Lamar Vee presents to [facility] PULMONARY [facility] CRITICAL CARE MEDICINE  History of Present Illness  Lamar Vee is a 54 y.o. female patient of Dr. Field here for management of pulmonary sarcoidosis, severe persistent asthma and chronic cough.  .     History of Present Illness  The patient presents for evaluation of wheezing.    She has been experiencing a decrease in her oxygen saturation levels, which drop from the 90s to the high 80s during physical activity. This issue has been ongoing for several weeks. She sought emergency care at [facility], where she was diagnosed with wheezing. She has abstained from steroid use due to personal reasons. She reports no significant cough or expectoration. Tests for influenza and pneumonia were negative. She is currently unable to work due to her symptoms. She attempted to return to work yesterday but was unsuccessful due to back pain. She continues to use Symbicort daily and albuterol as needed. She is also on Airsupra.    She has been diagnosed with osteoporosis and is currently on Fasenra for bone density. She is concerned about the potential interaction between Fasenra and steroids.    She has gastroparesis and was prescribed Reglan, but she is highly allergic to it. She experienced hives, confusion, inability to focus, and felt like her throat was swollen. She also had a very dry mouth all the time.    ALLERGIES  She is highly allergic to REGLAN.    MEDICATIONS  Current: Symbicort, albuterol, Airsupra, Fasenra       Her  history of smoking is   Tobacco Use: Low Risk  (2/11/2025)    Patient History     Smoking Tobacco Use: Never     Smokeless Tobacco Use: Never     Passive Exposure: Never   .    Review of Systems   Constitutional:  Positive for fatigue. Negative for chills, fever, unexpected weight gain and unexpected weight loss.   HENT:  Congestion: Nasal.    Respiratory:  Positive for cough, shortness of breath and wheezing. Negative for apnea.         Negative for Hemoptysis     Cardiovascular:  Negative for chest pain, palpitations and leg swelling.   Skin:         Negative for cyanosis      Sleep: Negative for Excessive daytime sleepiness  Negative for morning headaches  Negative for Snoring    Family History   Problem Relation Age of Onset    Colon cancer Mother 50    Cancer Mother     Cancer Maternal Aunt     Heart disease Maternal Aunt     Malig Hyperthermia Neg Hx         Social History     Socioeconomic History    Marital status:     Number of children: 2    Highest education level: Associate degree: academic program   Tobacco Use    Smoking status: Never     Passive exposure: Never    Smokeless tobacco: Never   Vaping Use    Vaping status: Never Used   Substance and Sexual Activity    Alcohol use: Not Currently    Drug use: Yes     Types: Hydrocodone, Hydromorphone, Oxycodone     Comment: HX OPIATE ABUSE- 15 YRS AGO    Sexual activity: Yes     Partners: Male     Birth control/protection: None, Hysterectomy        Past Medical History:   Diagnosis Date    Anxiety and depression     Asthma     Disease of thyroid gland     Family history of colon cancer in mother 10/08/2024    Gastroparesis     H/O: substance abuse     opiates    Melanoma 1992    Migraine     Neuropathy     Osteoporosis     Sarcoidosis     Seasonal allergies         Immunization History   Administered Date(s) Administered    COVID-19 (MODERNA) 1st,2nd,3rd Dose Monovalent 12/23/2020, 01/20/2021    Flu Vaccine Quad PF >36MO 11/19/2014, 11/12/2015,  09/13/2016, 09/19/2017    Flublok 18+yrs 10/20/2021, 11/01/2022    Fluzone  >6mos 10/17/2024    Fluzone (or Fluarix & Flulaval for VFC) >6mos 11/19/2014, 11/12/2015, 09/13/2016, 09/19/2017, 09/27/2023    Hepatitis A 11/03/2018    Influenza, Unspecified 11/19/2014, 11/12/2015, 09/30/2019    Pneumococcal Conjugate 13-Valent (PCV13) 09/13/2016, 09/19/2017    Pneumococcal Conjugate 20-Valent (PCV20) 09/27/2023    Pneumococcal Polysaccharide (PPSV23) 11/19/2014         Allergies   Allergen Reactions    Reglan [Metoclopramide] Hives, Mental Status Change and Confusion    Naloxone Hives    Oxycodone-Acetaminophen Hives    Tetanus Toxoids Unknown - Low Severity     CHILDHOOD REACTION      Morphine Headache    Pentazocine Hives    Tyloxapol Hives          Current Outpatient Medications:     albuterol sulfate  (90 Base) MCG/ACT inhaler, INHALE TWO PUFFS BY MOUTH EVERY 4 HOURS, Disp: 8.5 g, Rfl: 11    amitriptyline (ELAVIL) 100 MG tablet, Take 1 tablet by mouth every night at bedtime., Disp: , Rfl:     Benralizumab (Fasenra Pen) 30 MG/ML solution auto-injector, Inject 1 mL under the skin into the appropriate area as directed Every 2 (Two) Months., Disp: 1 mL, Rfl: 5    budesonide-formoterol (SYMBICORT) 160-4.5 MCG/ACT inhaler, Inhale 2 puffs 2 (Two) Times a Day., Disp: , Rfl:     buprenorphine (SUBUTEX) 8 MG sublingual tablet SL tablet, Place 3 tablets under the tongue Daily., Disp: 90 tablet, Rfl: 1    clonazePAM (KlonoPIN) 1 MG tablet, Take 1 tablet by mouth 2 (Two) Times a Day., Disp: , Rfl: 0    esomeprazole (nexIUM) 40 MG capsule, Take 1 capsule by mouth 2 (Two) Times a Day., Disp: 60 capsule, Rfl: 5    fluticasone (Flonase) 50 MCG/ACT nasal spray, 2 sprays into the nostril(s) as directed by provider Daily., Disp: 1 g, Rfl: 3    Forteo 600 MCG/2.4ML injection, Inject  under the skin into the appropriate area as directed., Disp: , Rfl:     furosemide (LASIX) 20 MG tablet, Take 1 tablet by mouth Daily., Disp: , Rfl:      GNP Calcium Citrate +D3 315-6.25 MG-MCG tablet tablet, TAKE ONE TABLET BY MOUTH TWICE DAILY AS DIRECTED, Disp: , Rfl:     ibuprofen (ADVIL,MOTRIN) 600 MG tablet, TAKE ONE TABLET BY MOUTH EVERY 6 HOURS AS NEEDED FOR PAIN not relieved by tylenol, Disp: , Rfl:     ipratropium-albuterol (DUO-NEB) 0.5-2.5 mg/3 ml nebulizer, USE 1 VIAL IN NEBULIZER FOUR TIMES DAILY AS NEEDED FOR 10 DAYS, Disp: , Rfl:     lamoTRIgine (LaMICtal) 200 MG tablet, Take 1 tablet by mouth Daily., Disp: , Rfl:     Linzess 290 MCG capsule capsule, Take 1 capsule by mouth Daily for 180 days., Disp: 90 capsule, Rfl: 1    ondansetron ODT (ZOFRAN-ODT) 8 MG disintegrating tablet, Place 1 tablet on the tongue Every 12 (Twelve) Hours As Needed for Nausea or Vomiting., Disp: , Rfl:     polyethylene glycol (MIRALAX) 17 GM/SCOOP powder, , Disp: , Rfl:     polyethylene glycol-electrolytes (NULYTELY) 420 g solution, Take per office instructions, Disp: , Rfl:     promethazine-dextromethorphan (PROMETHAZINE-DM) 6.25-15 MG/5ML syrup, take 5ml BY MOUTH FOUR TIMES DAILY AS NEEDED FOR 12 DAYS, Disp: , Rfl:     Rimegepant Sulfate (Nurtec) 75 MG tablet dispersible tablet, Take 1 tablet by mouth As Needed (Migraine)., Disp: , Rfl:     tiZANidine (ZANAFLEX) 4 MG tablet, Take 2 tablets by mouth Every 8 (Eight) Hours As Needed for Muscle Spasms., Disp: , Rfl: 5    Breztri Aerosphere 160-9-4.8 MCG/ACT aerosol inhaler, Inhale 2 puffs 2 (Two) Times a Day. (Patient not taking: Reported on 2/11/2025), Disp: , Rfl:     levoFLOXacin (Levaquin) 750 MG tablet, Take 1 tablet by mouth Daily., Disp: 7 tablet, Rfl: 0    levothyroxine (SYNTHROID, LEVOTHROID) 25 MCG tablet, Take 1 tablet by mouth Every Morning., Disp: , Rfl:     predniSONE (DELTASONE) 20 MG tablet, Take 1 tablet by mouth Daily., Disp: 7 tablet, Rfl: 0    Current Facility-Administered Medications:     lactulose (CHRONULAC) 10 GM/15ML solution 20 g, 20 g, Oral, Daily, Everett Tapia MD     Objective   Physical  "Exam  Constitutional:       General: She is not in acute distress.     Appearance: Normal appearance. She is normal weight.   HENT:      Right Ear: Hearing normal.      Left Ear: Hearing normal.      Nose: No nasal tenderness or congestion.      Mouth/Throat:      Mouth: Mucous membranes are moist. No oral lesions.   Eyes:      Extraocular Movements: Extraocular movements intact.      Pupils: Pupils are equal, round, and reactive to light.   Cardiovascular:      Rate and Rhythm: Normal rate and regular rhythm.      Pulses: Normal pulses.      Heart sounds: Normal heart sounds. No murmur heard.  Pulmonary:      Effort: Pulmonary effort is normal.      Breath sounds: Wheezing (expiratory) present. No rhonchi or rales.   Musculoskeletal:      Right lower leg: No edema.      Left lower leg: No edema.   Skin:     General: Skin is warm and dry.      Findings: No lesion or rash.   Neurological:      General: No focal deficit present.      Mental Status: She is alert and oriented to person, place, and time.   Psychiatric:         Mood and Affect: Affect normal. Mood is not anxious or depressed.         Vital Signs:   /85 (BP Location: Right arm, Patient Position: Sitting, Cuff Size: Adult)   Pulse 82   Temp 97.9 °F (36.6 °C) (Oral)   Resp 16   Ht 154.9 cm (61\")   Wt 65.8 kg (145 lb)   SpO2 95% Comment: RA  BMI 27.40 kg/m²        Result Review :   The following data was reviewed by: OSMAN Mcfadden on 02/11/2025:  CMP          10/22/2024    12:28   CMP   Glucose 83    BUN 9    Creatinine 0.93    EGFR 72.7    Sodium 141    Potassium 4.0    Chloride 102    Calcium 9.2    Total Protein 6.9    Albumin 4.0    Globulin 2.9    Total Bilirubin 0.3    Alkaline Phosphatase 89    AST (SGOT) 15    ALT (SGPT) 11    Albumin/Globulin Ratio 1.4    BUN/Creatinine Ratio 9.7    Anion Gap 8.2      CBC w/diff          4/30/2024    12:29 10/22/2024    12:28   CBC w/Diff   WBC 6.75  6.53    RBC 4.39  4.35    Hemoglobin 12.7  11.9 "    Hematocrit 38.0  38.0    MCV 86.6  87.4    MCH 28.9  27.4    MCHC 33.4  31.3    RDW 12.5  12.7    Platelets 202  250    Neutrophil Rel % 78.0  75.0    Immature Granulocyte Rel % 0.3  0.3    Lymphocyte Rel % 15.4  17.8    Monocyte Rel % 6.2  6.9    Eosinophil Rel % 0.0  0.0    Basophil Rel % 0.1  0.0      Personally reviewed methacholine challenge test 7/19/2024 and my last office note     Procedures        Assessment and Plan    Diagnoses and all orders for this visit:    1. Severe persistent asthma without complication (Primary)  Comments:  Continue Symbicort and Fasenra    2. Pulmonary sarcoidosis    3. Eosinophilic asthma  Comments:  Continue Symbicort and Fasenra    4. Acute cough  -     predniSONE (DELTASONE) 20 MG tablet; Take 1 tablet by mouth Daily.  Dispense: 7 tablet; Refill: 0  -     methylPREDNISolone sodium succinate (SOLU-Medrol) injection 125 mg  -     levoFLOXacin (Levaquin) 750 MG tablet; Take 1 tablet by mouth Daily.  Dispense: 7 tablet; Refill: 0    5. Other fatigue  -     predniSONE (DELTASONE) 20 MG tablet; Take 1 tablet by mouth Daily.  Dispense: 7 tablet; Refill: 0  -     methylPREDNISolone sodium succinate (SOLU-Medrol) injection 125 mg  -     levoFLOXacin (Levaquin) 750 MG tablet; Take 1 tablet by mouth Daily.  Dispense: 7 tablet; Refill: 0    6. Wheezing  -     predniSONE (DELTASONE) 20 MG tablet; Take 1 tablet by mouth Daily.  Dispense: 7 tablet; Refill: 0  -     methylPREDNISolone sodium succinate (SOLU-Medrol) injection 125 mg  -     levoFLOXacin (Levaquin) 750 MG tablet; Take 1 tablet by mouth Daily.  Dispense: 7 tablet; Refill: 0        Assessment & Plan  1. Wheezing.  Her condition is likely attributable to a viral infection. A regimen of Levaquin 750 mg will be initiated for a duration of 7 days. Concurrently, a course of steroid tablets will be prescribed for the same period. A one-time administration of a high-dose steroid injection will be given today. A work note will be  provided for her absence today.    2. Osteoporosis.  She is currently on medication for bone density.    3. Gastroparesis.  She is highly allergic to REGLAN, experiencing hives, confusion, and mental status changes.    Follow-up  The patient is scheduled for a follow-up visit in mid-April 2025, or earlier if necessary.    PROCEDURE  A one-time administration of a high-dose steroid injection was given today.          Follow Up   Return in about 2 months (around 4/11/2025) for Recheck.  Patient was given instructions and counseling regarding her condition or for health maintenance advice. Please see specific information pulled into the AVS if appropriate.

## 2025-02-18 ENCOUNTER — TELEPHONE (OUTPATIENT)
Age: 55
End: 2025-02-18
Payer: COMMERCIAL

## 2025-02-18 NOTE — TELEPHONE ENCOUNTER
PT CALLED STATING SHE WANTED TO SEE IF SHE COULD GET HER SUBOXONE FILLED A FEW DAYS EARLY BY PHARMACY DUE TO WEATHER COMING IN. PT STATES PHARMACY ONLY GAVE HER A PARTIAL FILL. CALLED PT PHARMACY SPOKE WITH PHARMACIST STATES THAT PT WAS GIVEN 48 PILLS DUE TO INSURANCE ONLY COVERING THAT AMOUNT STATES PT IS DUE FOR REFILL THIS FRIDAY AND THAT THE PHARMACY SHOULD BE OPEN AND PT INSURANCE WONT PAY FOR MED UNTIL THEN. CALLED MADE PT AWARE PT NOT HAPPY AS SHE IS CONCERNED ABOUT WEATHER

## 2025-02-20 DIAGNOSIS — F11.21 OPIOID DEPENDENCE IN REMISSION: ICD-10-CM

## 2025-02-20 RX ORDER — BUPRENORPHINE 8 MG/1
24 TABLET SUBLINGUAL DAILY
Qty: 90 TABLET | Refills: 0 | Status: CANCELLED | OUTPATIENT
Start: 2025-02-20

## 2025-02-20 RX ORDER — BUPRENORPHINE 8 MG/1
TABLET SUBLINGUAL DAILY
Qty: 18 EACH | Refills: 0 | Status: CANCELLED | OUTPATIENT
Start: 2025-02-20

## 2025-02-20 NOTE — TELEPHONE ENCOUNTER
PT IS OUT OF SUBOXONE MEDICATION. PT WANTING TO SEE IF SHE CAN GET A 6 DAY SUPPLY ON MEDICATION AS SHE WILL BE DUE HER NEXT REFILL FOR SUBOXONE 30 DAY SUPPLY THEN. PT WANTS TO SWITCH NEXT 30 DAY SUPPLY REFILL TO [facility]

## 2025-03-28 ENCOUNTER — TELEMEDICINE (OUTPATIENT)
Age: 55
End: 2025-03-28
Payer: COMMERCIAL

## 2025-03-28 DIAGNOSIS — F11.21 OPIOID DEPENDENCE IN REMISSION: Primary | ICD-10-CM

## 2025-03-28 DIAGNOSIS — F43.10 POST TRAUMATIC STRESS DISORDER (PTSD): ICD-10-CM

## 2025-03-28 NOTE — PROGRESS NOTES
[facility]   [address]  [place] [address]  [phone]     Referring physician/provider: No ref. provider found   PCP: Everett Tapia MD    Telehealth Encounter  Date of service: 3/28/2025    Time in: 10:50AM  Time Out: 11:31AM    Chief Complaint  Substance Abuse    Experiencing significant stress due to health issues and frequent absences from work.  Substance Use Disorder in sustained remission with MAT.  Concerned about managing changes related to her son's release from custodial and his girlfriend needing to find alternative accommodation.  Worried about her son's temper potentially leading to conflicts.  Pressure from her mother regarding decisions about her son's living arrangements.  Fatigue and difficulty sleeping, requiring medication to aid sleep.  Symptoms of vomiting and constipation due to food not properly digesting.  Loss of appetite, energy and weight loss resulting from chronic health condition  estimated between 128 and 130 pounds    Mode of Visit: Video   Location of patient: -HOME-   Location of provider: +HOME+   You have chosen to receive care through a telehealth visit.   The patient has signed the video visit consent form.   The visit included audio and video interaction. No technical issues occurred during this visit.     This provider is located at [facility] using a secure One [employer] (1BOG)t Video Visit through [employer]. Patient is being seen remotely via telehealth at home address in [place] and stated they are in a secure environment for this session. The patient's condition being diagnosed/treated is appropriate for telemedicine. The provider identified herself as well as her credentials. The patient, and/or patients guardian, consent to be seen remotely, and when consent is given they understand that the consent allows for patient identifiable information to be sent to a third party as needed. They may  refuse to be seen remotely at any time. The electronic data is encrypted and password protected, and the patient and/or guardian has been advised of the potential risks to privacy not withstanding such measures.     You have chosen to receive care through a telehealth visit.  Do you consent to use a video/audio connection for your medical care today? [x]  Yes     []  No     Referring Provider: Everett Tapia MD    Progress Note       History of Present Illness:   Lamar Vee is a 54 y.o. female who is being seen today for follow up individual Psychotherapy session.     History of Present Illness  The patient is a 54-year-old female being seen today for a follow-up psychotherapy appointment. She has been experiencing significant stress due to her health issues, which have resulted in frequent absences from work. She reports feeling fatigued and has been using medication to aid sleep. She plans to return to work on Sunday. She has no cravings or thoughts of wanting to use substances. She has been informed that she will need pain medication post-procedure and plans to discuss this with Dr. Tapia next week. She has been sober for 13 to 14 years.   She is scheduled for a temporary gastroparesis procedure on 04/17/2025, which will last for 10 days. If successful, a permanent procedure will be considered. She reports that her food is not properly digesting, leading to vomiting and constipation. She is uncertain about her current weight but estimates it to be between 128 and 130 pounds.  Additionally, she has bone density issues and is currently receiving daily injections for this condition which exacerbates her nausea.    Current stressors include her son soon to be released from [facility] on 05/01/2025 and plans to stay with her until he can transition to a [facility] house in [place]. However, his Ex-girlfriend, who currently resides with her and provides assistance, will need to find alternative accommodation as  she has recently entered into a new relationship. The patient is concerned about managing these changes, particularly as her son's temper could potentially lead to conflicts. She is also dealing with pressure from her mother regarding these decisions. Despite current stressors, she indicates sustained recovery, no new use or cravings and compliance with MAT.        ICD-10-CM ICD-9-CM   1. Opioid dependence in remission  F11.21 304.03   2. Post traumatic stress disorder (PTSD)  F43.10 309.81          Clinical Maneuvering/Intervention:   Assisted patient in processing above session content; acknowledged and normalized patient’s thoughts, feelings, and concerns by utilizing a person-centered approach in efforts to build appropriate rapport and a positive therapeutic relationship with open and honest communication.  Processed and rationalized patients thoughts and feelings regarding her substance use disorder and mental health concerns..  Discussed triggers associated with patient's substance use, anxiety and depression.  Also discussed coping skills for patient to implement such as deep breathing exercises. Guided imagery, mindful meditation. And encouraged 12 step support to support recovery efforts.     Allowed patient to freely discuss issues without interruption or judgment. Provided safe, confidential environment to facilitate the development of positive therapeutic relationship and encourage open, honest communication. Assisted patient in identifying risk factors which would indicate the need for higher level of care including thoughts to harm self or others and/or self-harming behavior and encouraged patient to contact this office, call 911, or present to the nearest emergency room should any of these events occur. Discussed crisis intervention services and means to access. Patient adamantly and convincingly denies current suicidal or homicidal ideation or perceptual disturbance.    PHQ-Score Total:  PHQ-9 Total  Score:      TIARRA-7 Score Total:  TIARRA-7 Score:                  Mental Status Exam:   Hygiene:   good  Cooperation:  Cooperative  Eye Contact:  Good  Psychomotor Behavior:  Appropriate  Affect:  Appropriate  Mood: anxious  Speech:  Normal  Thought Process:  Linear  Thought Content:  Normal  Suicidal:  None  Homicidal:  None  Hallucinations:  None  Delusion:  None  Memory:  Intact  Orientation:  Person, Place, Time, and Situation  Reliability:  good  Insight:  Fair  Judgement:  Fair  Impulse Control:  Good  Physical/Medical Issues:  Yes See Medical Hx.      Patient's Support Network Includes:  significant other and coworkers,treatment team.    Functional Status: Moderate impairment     Progress toward goal: At goal    Prognosis: Good with Ongoing Treatment     Medications:     Current Outpatient Medications:     albuterol sulfate  (90 Base) MCG/ACT inhaler, INHALE TWO PUFFS BY MOUTH EVERY 4 HOURS, Disp: 8.5 g, Rfl: 11    amitriptyline (ELAVIL) 100 MG tablet, Take 1 tablet by mouth every night at bedtime., Disp: , Rfl:     Benralizumab (Fasenra Pen) 30 MG/ML solution auto-injector, Inject 1 mL under the skin into the appropriate area as directed Every 2 (Two) Months., Disp: 1 mL, Rfl: 5    Breztri Aerosphere 160-9-4.8 MCG/ACT aerosol inhaler, Inhale 2 puffs 2 (Two) Times a Day. (Patient not taking: Reported on 2/11/2025), Disp: , Rfl:     budesonide-formoterol (SYMBICORT) 160-4.5 MCG/ACT inhaler, Inhale 2 puffs 2 (Two) Times a Day., Disp: , Rfl:     buprenorphine (SUBUTEX) 8 MG sublingual tablet SL tablet, Place 3 tablets under the tongue Daily., Disp: 90 tablet, Rfl: 1    clonazePAM (KlonoPIN) 1 MG tablet, Take 1 tablet by mouth 2 (Two) Times a Day., Disp: , Rfl: 0    esomeprazole (nexIUM) 40 MG capsule, Take 1 capsule by mouth 2 (Two) Times a Day., Disp: 60 capsule, Rfl: 5    fluticasone (Flonase) 50 MCG/ACT nasal spray, 2 sprays into the nostril(s) as directed by provider Daily., Disp: 1 g, Rfl: 3    Forteo  600 MCG/2.4ML injection, Inject  under the skin into the appropriate area as directed., Disp: , Rfl:     furosemide (LASIX) 20 MG tablet, Take 1 tablet by mouth Daily., Disp: , Rfl:     GNP Calcium Citrate +D3 315-6.25 MG-MCG tablet tablet, TAKE ONE TABLET BY MOUTH TWICE DAILY AS DIRECTED, Disp: , Rfl:     ibuprofen (ADVIL,MOTRIN) 600 MG tablet, TAKE ONE TABLET BY MOUTH EVERY 6 HOURS AS NEEDED FOR PAIN not relieved by tylenol, Disp: , Rfl:     ipratropium-albuterol (DUO-NEB) 0.5-2.5 mg/3 ml nebulizer, USE 1 VIAL IN NEBULIZER FOUR TIMES DAILY AS NEEDED FOR 10 DAYS, Disp: , Rfl:     lamoTRIgine (LaMICtal) 200 MG tablet, Take 1 tablet by mouth Daily., Disp: , Rfl:     levoFLOXacin (Levaquin) 750 MG tablet, Take 1 tablet by mouth Daily., Disp: 7 tablet, Rfl: 0    levothyroxine (SYNTHROID, LEVOTHROID) 25 MCG tablet, Take 1 tablet by mouth Every Morning., Disp: , Rfl:     Linzess 290 MCG capsule capsule, Take 1 capsule by mouth Daily for 180 days., Disp: 90 capsule, Rfl: 1    ondansetron ODT (ZOFRAN-ODT) 8 MG disintegrating tablet, Place 1 tablet on the tongue Every 12 (Twelve) Hours As Needed for Nausea or Vomiting., Disp: , Rfl:     polyethylene glycol (MIRALAX) 17 GM/SCOOP powder, , Disp: , Rfl:     polyethylene glycol-electrolytes (NULYTELY) 420 g solution, Take per office instructions, Disp: , Rfl:     predniSONE (DELTASONE) 20 MG tablet, Take 1 tablet by mouth Daily., Disp: 7 tablet, Rfl: 0    promethazine-dextromethorphan (PROMETHAZINE-DM) 6.25-15 MG/5ML syrup, take 5ml BY MOUTH FOUR TIMES DAILY AS NEEDED FOR 12 DAYS, Disp: , Rfl:     Rimegepant Sulfate (Nurtec) 75 MG tablet dispersible tablet, Take 1 tablet by mouth As Needed (Migraine)., Disp: , Rfl:     tiZANidine (ZANAFLEX) 4 MG tablet, Take 2 tablets by mouth Every 8 (Eight) Hours As Needed for Muscle Spasms., Disp: , Rfl: 5    Current Facility-Administered Medications:     lactulose (CHRONULAC) 10 GM/15ML solution 20 g, 20 g, Oral, Daily, Everett Tapia,  MD    Visit Diagnosis/Orders Placed This Visit:    ICD-10-CM ICD-9-CM   1. Opioid dependence in remission  F11.21 304.03   2. Post traumatic stress disorder (PTSD)  F43.10 309.81          Assessment and Plan   Diagnoses and all orders for this visit:    1. Opioid dependence in remission (Primary)    2. Post traumatic stress disorder (PTSD)        Assessment & Plan  1. Opioid use disorder, currently in remission.  She has been informed that she will need pain medication for an upcoming procedure. She will discuss pain management with Dr. Serrano next week to ensure a safe plan is in place. She has been sober for 13-14 years. She reports no new use, cravings or triggers and compliance with MAT.    2. Stress related to family and financial issues.  She is experiencing significant stress due to missed work and financial strain, resulting from chronic health problems. She is scheduled to have a temporary gastroparesis device placed on 04/17/2025 for 10 days. If it proves effective, a permanent device will be considered pending insurance approval.Added stress resulting from her son's impending release from detention and the need to find alternative living arrangements for his girlfriend are additional stressors. She is encouraged to set boundaries and prioritize her own needs.    The patient will coordinate continuity of care with Addictionologist Dr. Tapia to prevent compromise of her recovery for her OUD during upcoming gastroparesis procedure and will discuss pain management with Dr. Tapia next week.  Scheduled for a temporary gastroparesis device placement on 04/17/2025 for 10 days. If effective, a permanent device will be considered pending insurance approval.  Utilize Relapse Prevention plan and implement coping skills to manage stressors.  Encouraged to set boundaries and prioritize her own needs due to stress from family and financial issues.  Follow-up appointment scheduled for 05/23/2025 at 11:00  AM.    Follow-up  The patient will follow up on 05/23/2025 at 11:00 AM.      Safety: No acute safety concerns  Risk Assessment: Risk of self-harm acutely is low. Risk of self-harm chronically is also low, but could be further elevated in the event of treatment noncompliance and/or AODA.    Crisis Plan:  Symptoms and/or behaviors to indicate a crisis: Abuse of substances    What calming techniques or other strategies will patient use to de-escalate and stay safe: slow down, breathe, visualize calming self, think it though, listen to music, change focus, take a walk    Who is one person patient can contact to assist with de-escalation? Fiance    Treatment Plan/Goals: Patient will continue supportive psychotherapy efforts and medication regimen as prescribed. Therapist will provide Cognitive Behavioral Therapy to assist patient in improving functioning and gaining coping skills, maintaining stability, and avoiding decompensation and the need for higher level of care. Plan for treatment was discussed during today's visit. Patient acknowledged and verbally consented to continue with current treatment plan and was educated on the importance of compliance with treatment and follow-up appointments.     Patient will contact this office, call 911 or present to the nearest emergency room should suicidal or homicidal ideations occur.     Follow Up:   Return in 8 weeks (on 5/23/2025) for Next scheduled follow up, Video visit.    Patient's questions were answered.  Thank you for allowing me to participate in the care of this patient.  Dictated Utilizing Dragon Dictation. Please note that portions of this note were completed with a voice recognition program. Part of this note may be an electronic transcription/translation of spoken language to printed text using the Dragon Dictation System.      Patient or patient representative verbalized consent for the use of Ambient Listening during the visit with  [facility]  documentation. 3/28/2025  11:29 EDT    [facility]   Lalita Jerry [facility]  03/28/25  11:43 EDT

## 2025-03-31 ENCOUNTER — TELEPHONE (OUTPATIENT)
Age: 55
End: 2025-03-31
Payer: COMMERCIAL

## 2025-03-31 NOTE — TELEPHONE ENCOUNTER
PT CALLED STATES NEEDS PA FOR SUBUTEX CALL PHARMACY GOT INFORMATION TO KEY IN ON COVER MY MEDS PER PT INSURANCE.PT WAS MADE AWARE THAT I CAN'T OVERIDE THIS MESSAGE AND PT NEEDS TO CALL INSURANCE PT OK'D

## 2025-04-03 ENCOUNTER — OFFICE VISIT (OUTPATIENT)
Age: 55
End: 2025-04-03
Payer: COMMERCIAL

## 2025-04-03 VITALS
OXYGEN SATURATION: 99 % | SYSTOLIC BLOOD PRESSURE: 97 MMHG | HEART RATE: 106 BPM | BODY MASS INDEX: 25.41 KG/M2 | DIASTOLIC BLOOD PRESSURE: 69 MMHG | WEIGHT: 134.6 LBS | HEIGHT: 61 IN

## 2025-04-03 DIAGNOSIS — F11.21 OPIOID DEPENDENCE IN REMISSION: ICD-10-CM

## 2025-04-03 DIAGNOSIS — F43.10 POST TRAUMATIC STRESS DISORDER (PTSD): Primary | ICD-10-CM

## 2025-04-03 DIAGNOSIS — K31.84 GASTROPARESIS: ICD-10-CM

## 2025-04-03 DIAGNOSIS — J43.1 PANLOBULAR EMPHYSEMA: ICD-10-CM

## 2025-04-03 RX ORDER — BUPRENORPHINE 8 MG/1
24 TABLET SUBLINGUAL DAILY
Qty: 90 TABLET | Refills: 1 | Status: SHIPPED | OUTPATIENT
Start: 2025-04-03

## 2025-04-03 RX ORDER — LAMOTRIGINE 200 MG/1
200 TABLET ORAL DAILY
Qty: 90 TABLET | Refills: 1 | Status: SHIPPED | OUTPATIENT
Start: 2025-04-03
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
## 2025-04-16 DIAGNOSIS — F11.21 OPIOID DEPENDENCE IN REMISSION: ICD-10-CM

## 2025-05-08 ENCOUNTER — OFFICE VISIT (OUTPATIENT)
Dept: PULMONOLOGY | Facility: CLINIC | Age: 55
End: 2025-05-08
Payer: COMMERCIAL

## 2025-05-08 VITALS
WEIGHT: 134.4 LBS | BODY MASS INDEX: 25.37 KG/M2 | SYSTOLIC BLOOD PRESSURE: 106 MMHG | HEART RATE: 85 BPM | HEIGHT: 61 IN | TEMPERATURE: 97.6 F | OXYGEN SATURATION: 94 % | DIASTOLIC BLOOD PRESSURE: 74 MMHG | RESPIRATION RATE: 16 BRPM

## 2025-05-08 DIAGNOSIS — R06.09 DYSPNEA ON EXERTION: ICD-10-CM

## 2025-05-08 DIAGNOSIS — R05.3 CHRONIC COUGH: ICD-10-CM

## 2025-05-08 DIAGNOSIS — D86.0 PULMONARY SARCOIDOSIS: ICD-10-CM

## 2025-05-08 DIAGNOSIS — J82.83 EOSINOPHILIC ASTHMA: Chronic | ICD-10-CM

## 2025-05-08 DIAGNOSIS — J45.50 SEVERE PERSISTENT ASTHMA WITHOUT COMPLICATION: Primary | Chronic | ICD-10-CM

## 2025-05-08 RX ORDER — PREDNISONE 20 MG/1
20 TABLET ORAL DAILY
Qty: 7 TABLET | Refills: 0 | Status: SHIPPED | OUTPATIENT
Start: 2025-05-08

## 2025-05-08 RX ORDER — PROMETHAZINE HYDROCHLORIDE 25 MG/1
TABLET ORAL
COMMUNITY
Start: 2025-02-12

## 2025-05-08 RX ORDER — METOCLOPRAMIDE 10 MG/1
TABLET ORAL
COMMUNITY
Start: 2025-01-29

## 2025-05-08 RX ORDER — LEVOFLOXACIN 750 MG/1
750 TABLET, FILM COATED ORAL DAILY
Qty: 7 TABLET | Refills: 0 | Status: SHIPPED | OUTPATIENT
Start: 2025-05-08

## 2025-05-08 NOTE — PROGRESS NOTES
"Primary Care Provider  Everett Tapia MD     Referring Provider  No ref. provider found       Patient or patient representative verbalized consent for the use of Ambient Listening during the visit with  OSMAN Mcfadden for chart documentation. 5/8/2025  13:36 EDT    Chief Complaint  Asthma, Cough (Clear thick mucus - due to allergies), and Follow-up (2 month f/up )    Subjective          Lamar Vee presents to [facility] PULMONARY & CRITICAL CARE MEDICINE  History of Present Illness  Lamar Vee is a 54 y.o. female patient of Dr. Field here for management of pulmonary sarcoidosis, severe persistent asthma and chronic cough.     History of Present Illness  The patient is a 54-year-old female who presents for evaluation of respiratory issues.    She reports experiencing respiratory discomfort, which she describes as a \"junky\" feeling. She is uncertain if this is related to the weather. She has expressed a desire to be prescribed steroids but is hesitant to take them due to her osteoporosis. She was previously prescribed prednisone and Levaquin, which she found beneficial.  She continues to take Symbicort and  Fasenra.  She is benefiting from both.    Supplemental Information  She is scheduled for laparoscopic surgery, which will involve the placement of a stimulator box. This device is designed to deliver shocks when it detects malfunctioning. She has been informed that the procedure will last approximately 1.5 to 2 hours. She will be required to wear the device around her neck for a duration of 10 days to 2 weeks post-surgery. The stimulator is intended to aid in the functioning of her small intestines.    MEDICATIONS  Past: prednisone, Levaquin       Her history of smoking is   Tobacco Use: Low Risk  (5/8/2025)    Patient History     Smoking Tobacco Use: Never     Smokeless Tobacco Use: Never     Passive Exposure: Never   .    Review of Systems   Constitutional:  Negative for " chills, fatigue, fever, unexpected weight gain and unexpected weight loss.   HENT:  Congestion: Nasal.    Respiratory:  Positive for cough. Negative for apnea, shortness of breath and wheezing.         Negative for Hemoptysis     Cardiovascular:  Negative for chest pain, palpitations and leg swelling.   Skin:         Negative for cyanosis      Sleep: Negative for Excessive daytime sleepiness  Negative for morning headaches  Negative for Snoring    Family History   Problem Relation Age of Onset    Colon cancer Mother 50    Cancer Mother     Cancer Maternal Aunt     Heart disease Maternal Aunt     Malig Hyperthermia Neg Hx         Social History     Socioeconomic History    Marital status:     Number of children: 2    Highest education level: Associate degree: academic program   Tobacco Use    Smoking status: Never     Passive exposure: Never    Smokeless tobacco: Never   Vaping Use    Vaping status: Never Used   Substance and Sexual Activity    Alcohol use: Not Currently    Drug use: Yes     Types: Hydrocodone, Hydromorphone, Oxycodone     Comment: HX OPIATE ABUSE- 15 YRS AGO    Sexual activity: Yes     Partners: Male     Birth control/protection: None, Hysterectomy        Past Medical History:   Diagnosis Date    Anxiety and depression     Asthma     Disease of thyroid gland     Family history of colon cancer in mother 10/08/2024    Gastroparesis     H/O: substance abuse     opiates    Melanoma 1992    Migraine     Neuropathy     Osteoporosis     Sarcoidosis     Seasonal allergies         Immunization History   Administered Date(s) Administered    COVID-19 (MODERNA) 1st,2nd,3rd Dose Monovalent 12/23/2020, 01/20/2021    Flu Vaccine Quad PF >36MO 11/19/2014, 11/12/2015, 09/13/2016, 09/19/2017    Flublok 18+yrs 10/20/2021, 11/01/2022    Fluzone  >6mos 10/17/2024    Fluzone (or Fluarix & Flulaval for VFC) >6mos 11/19/2014, 11/12/2015, 09/13/2016, 09/19/2017, 09/27/2023    Hepatitis A 11/03/2018    Influenza,  Unspecified 11/19/2014, 11/12/2015, 09/30/2019    Pneumococcal Conjugate 13-Valent (PCV13) 09/13/2016, 09/19/2017    Pneumococcal Conjugate 20-Valent (PCV20) 09/27/2023    Pneumococcal Polysaccharide (PPSV23) 11/19/2014         Allergies   Allergen Reactions    Reglan [Metoclopramide] Hives, Mental Status Change and Confusion    Naloxone Hives    Oxycodone-Acetaminophen Hives    Tetanus Toxoids Unknown - Low Severity     CHILDHOOD REACTION      Buprenorphine-Naloxone Hives    Morphine Headache    Pentazocine Hives    Tyloxapol Hives          Current Outpatient Medications:     albuterol sulfate  (90 Base) MCG/ACT inhaler, INHALE TWO PUFFS BY MOUTH EVERY 4 HOURS, Disp: 8.5 g, Rfl: 11    amitriptyline (ELAVIL) 100 MG tablet, Take 1 tablet by mouth every night at bedtime., Disp: , Rfl:     Benralizumab (Fasenra Pen) 30 MG/ML solution auto-injector, Inject 1 mL under the skin into the appropriate area as directed Every 2 (Two) Months., Disp: 1 mL, Rfl: 5    budesonide-formoterol (SYMBICORT) 160-4.5 MCG/ACT inhaler, Inhale 2 puffs 2 (Two) Times a Day., Disp: , Rfl:     buprenorphine (SUBUTEX) 8 MG sublingual tablet SL tablet, Place 3 tablets under the tongue Daily., Disp: 90 tablet, Rfl: 1    clonazePAM (KlonoPIN) 1 MG tablet, Take 1 tablet by mouth 2 (Two) Times a Day., Disp: , Rfl: 0    esomeprazole (nexIUM) 40 MG capsule, Take 1 capsule by mouth 2 (Two) Times a Day., Disp: 60 capsule, Rfl: 5    fluticasone (Flonase) 50 MCG/ACT nasal spray, 2 sprays into the nostril(s) as directed by provider Daily., Disp: 1 g, Rfl: 3    Forteo 600 MCG/2.4ML injection, Inject  under the skin into the appropriate area as directed., Disp: , Rfl:     furosemide (LASIX) 20 MG tablet, Take 1 tablet by mouth Daily., Disp: , Rfl:     GNP Calcium Citrate +D3 315-6.25 MG-MCG tablet tablet, TAKE ONE TABLET BY MOUTH TWICE DAILY AS DIRECTED, Disp: , Rfl:     ibuprofen (ADVIL,MOTRIN) 600 MG tablet, TAKE ONE TABLET BY MOUTH EVERY 6 HOURS AS  NEEDED FOR PAIN not relieved by tylenol, Disp: , Rfl:     ipratropium-albuterol (DUO-NEB) 0.5-2.5 mg/3 ml nebulizer, USE 1 VIAL IN NEBULIZER FOUR TIMES DAILY AS NEEDED FOR 10 DAYS, Disp: , Rfl:     lamoTRIgine (LaMICtal) 200 MG tablet, Take 1 tablet by mouth Daily., Disp: 90 tablet, Rfl: 1    Linzess 290 MCG capsule capsule, Take 1 capsule by mouth Daily for 180 days., Disp: 90 capsule, Rfl: 1    metoclopramide (REGLAN) 10 MG tablet, Take 1 tablet (10 mg total) by mouth 3 (three) times a day., Disp: , Rfl:     ondansetron ODT (ZOFRAN-ODT) 8 MG disintegrating tablet, Place 1 tablet on the tongue Every 12 (Twelve) Hours As Needed for Nausea or Vomiting., Disp: , Rfl:     polyethylene glycol (MIRALAX) 17 GM/SCOOP powder, , Disp: , Rfl:     polyethylene glycol-electrolytes (NULYTELY) 420 g solution, Take per office instructions, Disp: , Rfl:     promethazine (PHENERGAN) 25 MG tablet, Take 1 tablet (25 mg total) by mouth every 6 (six) hours if needed for nausea or vomiting., Disp: , Rfl:     Rimegepant Sulfate (Nurtec) 75 MG tablet dispersible tablet, Take 1 tablet by mouth As Needed (Migraine)., Disp: , Rfl:     tiZANidine (ZANAFLEX) 4 MG tablet, Take 2 tablets by mouth Every 8 (Eight) Hours As Needed for Muscle Spasms., Disp: , Rfl: 5    levoFLOXacin (Levaquin) 750 MG tablet, Take 1 tablet by mouth Daily., Disp: 7 tablet, Rfl: 0    levothyroxine (SYNTHROID, LEVOTHROID) 25 MCG tablet, Take 1 tablet by mouth Every Morning., Disp: , Rfl:     predniSONE (DELTASONE) 20 MG tablet, Take 1 tablet by mouth Daily., Disp: 7 tablet, Rfl: 0    promethazine-dextromethorphan (PROMETHAZINE-DM) 6.25-15 MG/5ML syrup, take 5ml BY MOUTH FOUR TIMES DAILY AS NEEDED FOR 12 DAYS (Patient not taking: Reported on 5/8/2025), Disp: , Rfl:     Current Facility-Administered Medications:     lactulose (CHRONULAC) 10 GM/15ML solution 20 g, 20 g, Oral, Daily, Everett Tapia MD     Objective   Physical Exam  Constitutional:       General: She is not  "in acute distress.     Appearance: Normal appearance. She is normal weight.   HENT:      Right Ear: Hearing normal.      Left Ear: Hearing normal.      Nose: No nasal tenderness or congestion.      Mouth/Throat:      Mouth: Mucous membranes are moist. No oral lesions.   Eyes:      Extraocular Movements: Extraocular movements intact.      Pupils: Pupils are equal, round, and reactive to light.   Cardiovascular:      Rate and Rhythm: Normal rate and regular rhythm.      Pulses: Normal pulses.      Heart sounds: Normal heart sounds. No murmur heard.  Pulmonary:      Effort: Pulmonary effort is normal.      Breath sounds: Normal breath sounds. No wheezing, rhonchi or rales.   Musculoskeletal:      Right lower leg: No edema.      Left lower leg: No edema.   Skin:     General: Skin is warm and dry.      Findings: No lesion or rash.   Neurological:      General: No focal deficit present.      Mental Status: She is alert and oriented to person, place, and time.   Psychiatric:         Mood and Affect: Affect normal. Mood is not anxious or depressed.         Vital Signs:   /74 (BP Location: Right arm, Patient Position: Sitting, Cuff Size: Adult)   Pulse 85   Temp 97.6 °F (36.4 °C) (Oral)   Resp 16   Ht 154.9 cm (61\")   Wt 61 kg (134 lb 6.4 oz)   SpO2 94% Comment: RA  BMI 25.39 kg/m²        Result Review :   The following data was reviewed by: OSMAN Mcfadden on 04/11/2025:  CMP          10/22/2024    12:28   CMP   Glucose 83    BUN 9    Creatinine 0.93    EGFR 72.7    Sodium 141    Potassium 4.0    Chloride 102    Calcium 9.2    Total Protein 6.9    Albumin 4.0    Globulin 2.9    Total Bilirubin 0.3    Alkaline Phosphatase 89    AST (SGOT) 15    ALT (SGPT) 11    Albumin/Globulin Ratio 1.4    BUN/Creatinine Ratio 9.7    Anion Gap 8.2      CBC w/diff          4/30/2024    12:29 10/22/2024    12:28   CBC w/Diff   WBC 6.75  6.53    RBC 4.39  4.35    Hemoglobin 12.7  11.9    Hematocrit 38.0  38.0    MCV 86.6  " "87.4    MCH 28.9  27.4    MCHC 33.4  31.3    RDW 12.5  12.7    Platelets 202  250    Neutrophil Rel % 78.0  75.0    Immature Granulocyte Rel % 0.3  0.3    Lymphocyte Rel % 15.4  17.8    Monocyte Rel % 6.2  6.9    Eosinophil Rel % 0.0  0.0    Basophil Rel % 0.1  0.0      Data reviewed : Radiologic studies chest xray 4/30/2024, PFT 1/25/2019, methacholine challenge test 7/19/2024 and my last office note    Procedures        Assessment and Plan    Diagnoses and all orders for this visit:    1. Severe persistent asthma without complication (Primary)  Comments:  Continue Symbicort and Fasenra  Orders:  -     predniSONE (DELTASONE) 20 MG tablet; Take 1 tablet by mouth Daily.  Dispense: 7 tablet; Refill: 0  -     levoFLOXacin (Levaquin) 750 MG tablet; Take 1 tablet by mouth Daily.  Dispense: 7 tablet; Refill: 0    2. Eosinophilic asthma  Comments:  Continue Symbicort and Fasenra    3. Pulmonary sarcoidosis    4. Chronic cough    5. Dyspnea on exertion  Comments:  Continue albuterol as needed      Cleared for gastric stimulator surgery if needed.    Assessment & Plan  1. Respiratory discomfort.  She reports feeling \"junky\" and requests a prescription for steroids. A prescription for prednisone 20 mg for a duration of one week has been provided, to be taken concurrently with an antibiotic. The potential risks associated with steroid use, particularly in relation to her osteoporosis, have been discussed. The prescription will be sent to [facility].          Follow Up   Return in about 3 months (around 8/8/2025) for Recheck.  Patient was given instructions and counseling regarding her condition or for health maintenance advice. Please see specific information pulled into the AVS if appropriate.       "

## 2025-05-15 ENCOUNTER — TELEPHONE (OUTPATIENT)
Age: 55
End: 2025-05-15
Payer: COMMERCIAL

## 2025-05-15 NOTE — TELEPHONE ENCOUNTER
PT CALLED STATES SHE IS HAVING SURGERY MONDAY A STENT PUT IB HER STOMACH. PT NEEDS A LETTER FAXED TO SURGEON STATING ITS OK TO WRITE PAIN MEDS. PLEASE ADVISE IF YOU WOULD LIKE ME TO SET UP A PHONE CALL WITH SURGEON OR DO YOU WISH TO FAX LETTER . THE SURGEON INFO   Timothy Yan MD (Attending)  NPI: [id number]  [phone] (Work)  [phone] (Fax)  [address] [place] [address]  General Surgery

## 2025-05-20 ENCOUNTER — TELEPHONE (OUTPATIENT)
Age: 55
End: 2025-05-20
Payer: COMMERCIAL

## 2025-05-20 NOTE — TELEPHONE ENCOUNTER
2ND ATTEMPT CALLED DR MORA OFFICE TO SEE IF PROVIDER COULD SPEAK WITH DR. MARROQUIN ABOUT PT UPCOMING SURGERY LM WITH MEDICAL STAFF

## 2025-05-20 NOTE — TELEPHONE ENCOUNTER
PT CALLED STATES DR. MORA GAVE HER HYDROCODONE 7.5MG # 18 PILLS, PT STATES MEDICATION IS NOT WORKING AND IS NOT TOUCHING HER PAIN.

## 2025-05-22 DIAGNOSIS — K31.84 GASTROPARESIS: Primary | ICD-10-CM

## 2025-05-22 RX ORDER — HYDROCODONE BITARTRATE AND ACETAMINOPHEN 7.5; 325 MG/1; MG/1
1 TABLET ORAL EVERY 6 HOURS PRN
Qty: 15 TABLET | Refills: 0 | Status: SHIPPED | OUTPATIENT
Start: 2025-05-22 | End: 2025-05-22

## 2025-05-22 RX ORDER — HYDROCODONE BITARTRATE AND ACETAMINOPHEN 7.5; 325 MG/1; MG/1
1 TABLET ORAL
Qty: 15 TABLET | Refills: 0 | Status: SHIPPED | OUTPATIENT
Start: 2025-05-22 | End: 2025-05-27

## 2025-05-22 RX ORDER — HYDROCODONE BITARTRATE AND ACETAMINOPHEN 7.5; 325 MG/1; MG/1
1 TABLET ORAL
Qty: 15 TABLET | Refills: 0 | Status: SHIPPED | OUTPATIENT
Start: 2025-05-22 | End: 2025-05-22 | Stop reason: SDUPTHER

## 2025-05-22 NOTE — TELEPHONE ENCOUNTER
PT CALLED BACK STATES SHE HAS 2 DAY SUPPLY ON HYDROCODONE LEFT PT STATES SHE HAS STAPLES AND NOT STITCHES AND IS IN QUITE A BIT OF PAIN STILL PT WANTING TO SEE ABOUT GETTING REFILL PLEASE ADVISE

## 2025-05-23 ENCOUNTER — TELEMEDICINE (OUTPATIENT)
Age: 55
End: 2025-05-23
Payer: COMMERCIAL

## 2025-05-23 DIAGNOSIS — F43.10 POST TRAUMATIC STRESS DISORDER (PTSD): ICD-10-CM

## 2025-05-23 DIAGNOSIS — F11.21 OPIOID DEPENDENCE IN REMISSION: Primary | ICD-10-CM

## 2025-05-23 NOTE — TREATMENT PLAN
Multi-Disciplinary Problems (from Behavioral Health Treatment Plan)      Active Problems       Problem: Substance Abuse Issues  Start Date: 05/23/25      Problem Details: The patient self-scales this problem as a 10 with 10 being the worst.  The patient has a substance use disorder, which has significantly affected her relationships, work, social interactions, and emotional well-being. She is currently undergoing medication-assisted treatment (MAT) to aid in her recovery efforts.          Risk of Relapse: The patient remains at risk for relapse due to potential exposure to triggers and stressors.     Medication Adherence: Ensuring consistent adherence to MAT to maintain remission and prevent relapse.         Goal Priority Start Date Expected End Date End Date    Patient will abstain from mood altering substances. Critical 05/23/25 11/21/25 --    Goal Details: Progress toward goal:  Not appropriate to rate progress toward goal since this is the initial treatment plan.  GOAL DETAILS: The patient will achieve and maintain abstinence from all mood-altering substances, including alcohol and drugs.          Goal Interventions:     Individual Therapy:     Activity: Engage in regular individual therapy sessions to address underlying issues contributing to substance use.     Frequency: Weekly sessions for 12 weeks.     Measurement: Patient's progress in identifying triggers and developing coping strategies.     Support Groups:     Activity: Attend support group meetings, such as Alcoholics Anonymous (AA) or Narcotics Anonymous (NA).     Frequency: Weekly meetings.     Measurement: Patient's participation and engagement in group discussions.     Relapse Prevention Plan:     Activity: Develop a personalized relapse prevention plan, including identifying high-risk situations and strategies to manage cravings.     Frequency: One-time development with weekly reviews.     Measurement: Patient's ability to implement the plan and  avoid relapse.     Healthy Lifestyle Changes:     Activity: Encourage healthy lifestyle changes, such as regular exercise, balanced diet, and adequate sleep.     Frequency: Daily practice.     Measurement: Patient's self-reported improvements in physical and mental health.     Medication Management:     Activity: adhere to prescribed medication regimen to support abstinence.     Frequency: As prescribed by a healthcare provider.     Measurement: Patient's adherence to medication and reduction in substance use.         Goal Intervention Frequency Start Date End Date    Provide education to increase patients understanding of addiction and relapse processes. Q2 Months 05/23/25 --    Intervention Details: Duration of treatment until discharged    .Educational Sessions:     Conduct bimonthly educational sessions focused on the nature of addiction, the brain's response to substances, and the psychological and physical aspects of dependency.     Include information on the stages of relapse (emotional, mental, and physical) and strategies to recognize and manage these stages.     Resource Provision:     Provide the patient with educational materials, such as pamphlets, articles, and online resources, that they can review at their own pace.     Encourage the use of recovery apps that offer daily tips, reminders, and support for managing addiction and preventing relapse.      Support Groups:     Integrate individual counseling sessions to discuss personal experiences with addiction and relapse, and develop personalized strategies for prevention.     Recommend participation in support groups where the patient can share experiences and learn from others who have successfully managed their addiction.     Follow-Up and Evaluation:     Schedule regular follow-up appointments to assess the patient's understanding and application of the educational content.     Adjust the educational plan based on sebastien lee's progress and feedback  to ensure it remains effective and relevant.                             Goal Priority Start Date Expected End Date End Date    Patient will develop insight into how to improve their relationships. High 05/23/25 11/21/25 --    Goal Details: Progress toward goal:  Not appropriate to rate progress toward goal since this is the initial treatment plan.  GOAL DETAILS:     The patient will gain a deeper understanding of their interpersonal dynamics and learn strategies to enhance their relationships.     Interventions:     Individual Therapy:     Activity: Engage in therapy sessions to explore past and present relationship patterns and identify areas for improvement.     Frequency: Weekly sessions for 12 weeks.     Measurement: Patient's ability to articulate insights gained about their relationship behaviors.     Communication Skills Training:     Activity: Participate in exercises to improve communication skills, including active listening, assertiveness, and empathy.     Frequency: Weekly practice during therapy sessions and daily application.     Measurement: Patient's increased use of effective communication techniques in interactions.     Conflict Resolution Strategies:     Activity: Learn and practice conflict resolution strategies to manage disagreements constructively.     Frequency: Weekly sessions with role-playing scenarios.     Measurement: Patient's ability to apply conflict resolution techniques in real-life situations.     Self-Reflection Exercises:     Activity: Engage in self-reflection exercises, such as journaling, to understand personal contributions to relationship dynamics.     Frequency: Daily journaling for 10 minutes.     Measurement: Patient's self-reported insights and changes in relationship behaviors.     Relationship Building Activities:     Activity: Participate in activities that promote relationship building, such as shared hobbies or quality time with loved ones.     Frequency: Weekly  "planned activities.     Measurement: Patient's increased engagement in relationship-building activities and improved relationship satisfaction.     Expected Outcomes:     The patient will demonstrate improved communication skills and conflict resolution abilities.     The patient will gain insight into their relationship patterns and behaviors.     The patient will report enhanced relationship satisfaction and stronger connections with others.     The patient will actively engage in activities that foster positive relationships.         Goal Intervention Frequency Start Date End Date    Educate about 12 step recovery programs. Q2 Months 05/23/25 --    Intervention Details: Duration of treatment until discharged  Introduction to 12-Step Programs:     Provide an overview of the history, principles, and structure of 12-step recovery programs such as Alcoholics Anonymous (AA) and Narcotics Anonymous (NA).     Explain the significance of each step and how they collectively contribute to the recovery process.     Educational Materials:     Distribute literature, including pamphlets and booklets, that detail the 12 steps and the philosophy behind them.     Recommend reading materials such as the \"Big Book\" of AA, which offers insights and personal stories from individuals in recovery.     Interactive Sessions:     Conduct interactive sessions where the patient can discuss each step, ask questions, and share their thoughts.     Use visual aids and multimedia presentations to enhance understanding and retention of the information.     Support Group Participation:     Encourage the patient to attend local 12-step meetings to observe and participate in the group dynamics.     Provide information on meeting schedules, locations, and online options to ensure accessibility.     Personal Reflection and Application:     Integrate individual counseling sessions to help the patient reflect on how the 12 steps can be applied to their " own recovery journey.     Develop personalized strategies for incorporating the principles of the 12 steps into daily life.     Follow-Up and Evaluation:     Schedule regular follow-up appointments to assess the patient's understanding and engagement with the 12-step program.     Adjust the educational approach based on the patient's progress and feedback to ensure it remains effective and supportive.         Goal Priority Start Date Expected End Date End Date    Patient will improve positive self regard. High 05/23/25 11/21/25 --    Goal Details: Progress toward goal:  Not appropriate to rate progress toward goal since this is the initial treatment plan.    Cognitive Behavioral Therapy (CBT):     Activity: Engage in CBT sessions to identify and challenge negative self-talk and cognitive distortions.     Frequency: Bimonthly sessions while undergoing MAT with suboxone    Measurement: Patient's ability to reframe negative thoughts into positive affirmations.     Self-Compassion Exercises:     Activity: Practice self-compassion exercises, such as self-kindness and mindfulness meditation.     Frequency: Daily practice for 10 minutes.     Measurement: Patient's self-reported increase in feelings of self-compassion and reduced self-criticism.     Strengths Identification:     Activity: Create a list of personal strengths and achievements.     Frequency: One-time activity with bimonthly reviews    Measurement: Patient's ability to identify and articulate their strengths.     Positive Affirmations:     Activity: Develop and repeat positive affirmations daily.     Frequency: Daily practice, morning and evening.     Measurement: Patient's increased use of positive affirmations in daily life.     Goal Setting:     Activity: Set and work towards small, achievable personal goals.     Frequency: Monthly goal-setting sessions.     Measurement: Patient's progress in achieving set goals and increased self-efficacy.     Expected  Outcomes:     The patient will demonstrate an improved ability to recognize and appreciate their strengths.     The patient will show a reduction in negative self-talk and cognitive distortions.     The patient will report increased feelings of self-compassion and self-worth.     The patient will successfully achieve personal goals, reinforcing positive self-regard.         Goal Intervention Frequency Start Date End Date    Educate patient about how substance use affects their relationships. Q2 Months 05/23/25 --    Intervention Details: Duration of treatment until discharged.                        Reviewed By       Lalita Jerry [facility] 05/23/25 1126                     I have discussed and reviewed this treatment plan with the patient.

## 2025-05-23 NOTE — PROGRESS NOTES
[facility]   [address]  [place] [address]  [phone]     Referring physician/provider: No ref. provider found   PCP: Everett Tapia MD    Telehealth Encounter  Date of service: 5/23/2025    Time in: 10:55 AM  Time Out: 11:27 AM    Chief Complaint  Substance Use    Mode of Visit: Video   Location of patient: -HOME-   Location of provider: +[facility]   You have chosen to receive care through a telehealth visit.   The patient has signed the video visit consent form.   The visit included audio and video interaction. No technical issues occurred during this visit.     This provider is located at [facility] using a secure [employer] Video Visit through "[employer]. Patient is being seen remotely via telehealth at home address in [place] and stated they are in a secure environment for this session. The patient's condition being diagnosed/treated is appropriate for telemedicine. The provider identified herself as well as her credentials. The patient, and/or patients guardian, consent to be seen remotely, and when consent is given they understand that the consent allows for patient identifiable information to be sent to a third party as needed. They may refuse to be seen remotely at any time. The electronic data is encrypted and password protected, and the patient and/or guardian has been advised of the potential risks to privacy not withstanding such measures.     You have chosen to receive care through a telehealth visit.  Do you consent to use a video/audio connection for your medical care today? [x]  Yes     []  No     Referring Provider: Everett Tapia MD    Progress Note         History of Present Illness  The patient is a 54-year-old female seen today via virtual visit for a follow-up psychotherapy appointment and treatment plan development. She underwent abdominal surgery on 05/19/2025, initially planned as laparoscopic  but required an open approach. She reports severe post-surgical pain not adequately managed by hydrocodone 7.5 mg and has been advised to gradually discontinue the medication. She experiences difficulty in bending, lying down, and requires assistance to stand up from a lying position. She has been unable to work for two weeks and is currently on unpaid leave, which has created financial stress. Her weight has decreased from 130 to 123 pounds; protein shakes were tried but found unpalatable. She continues to take Suboxone, and her treatment team has collaborated to address pain management appropriately due to her recovery status and opioid use disorder. Despite surgery and pain, she does not feel her recovery is at risk and has been utilizing prescribed opiates as directed with no excessive use or abuse. Her son was recently released from incarceration, and she has encountered issues with him regarding his return home. She recognizes he needs to stay sober and manage his life independently, and she must set boundaries to protect her well-being and sobriety.  Interim History: She reports feeling stressed recently.     Social History:  - Unable to work for two weeks  - Currently on unpaid leave  - Son returned home causing additional stress      Substance Use: She continues to take Suboxone and hydrocodone 7.5 prescribed and managed by her addictionologist Dr. Tapia          ICD-10-CM ICD-9-CM   1. Opioid dependence in remission  F11.21 304.03   2. Post traumatic stress disorder (PTSD)  F43.10 309.81          Clinical Maneuvering/Intervention:   Assisted patient in processing above session content; acknowledged and normalized patient’s thoughts, feelings, and concerns by utilizing a person-centered approach in efforts to build appropriate rapport and a positive therapeutic relationship with open and honest communication. Processed and rationalized patient's thoughts and feelings regarding her post-surgical pain and  recovery challenges. Discussed triggers associated with patient's financial stress and her son's recent release from incarceration.Also discussed coping skills for patient to implement such as: Practicing mindfulness meditation daily to manage stress, engaging in regular physical exercise such as walking or yoga to improve mood, and maintaining a balanced diet for overall well-being. Journaling can help process emotions, while deep breathing exercises can reduce anxiety. Connecting with friends, family, or support groups provides social support, and pursuing hobbies like painting or gardening can bring relaxation. Additionally, participation in 12-step programs like Alcoholics Anonymous (AA) or Narcotics Anonymous (NA) is encouraged, along with obtaining a sponsor for guidance and accountability throughout the recovery journey.    Allowed patient to freely discuss issues without interruption or judgment. Provided safe, confidential environment to facilitate the development of positive therapeutic relationship and encourage open, honest communication. Assisted patient in identifying risk factors which would indicate the need for higher level of care including thoughts to harm self or others and/or self-harming behavior and encouraged patient to contact this office, call 911, or present to the nearest emergency room should any of these events occur. Discussed crisis intervention services and means to access. Patient adamantly and convincingly denies current suicidal or homicidal ideation or perceptual disturbance.    PHQ-Score Total:  PHQ-9 Total Score:      TIARRA-7 Score Total:  TIARRA-7 Score:                  Mental Status Exam:   Hygiene:   good  Cooperation:  Cooperative  Eye Contact:  Good  Psychomotor Behavior:  Appropriate  Affect:  Appropriate  Mood: normal  Speech:  Normal  Thought Process:  Goal directed  Thought Content:  Normal  Suicidal:  None  Homicidal:  None  Hallucinations:  None  Delusion:  None  Memory:   Intact  Orientation:  Person, Place, Time, and Situation  Reliability:  good  Insight:  Good  Judgement:  Good  Impulse Control:  Good  Physical/Medical Issues:  Yes Post abddominal surgery      Patient's Support Network Includes:  significant other    Functional Status: Mild impairment     Progress toward goal: Not at goal    Prognosis: Good with Ongoing Treatment     Medications:     Current Outpatient Medications:     albuterol sulfate  (90 Base) MCG/ACT inhaler, INHALE TWO PUFFS BY MOUTH EVERY 4 HOURS, Disp: 8.5 g, Rfl: 11    amitriptyline (ELAVIL) 100 MG tablet, Take 1 tablet by mouth every night at bedtime., Disp: , Rfl:     Benralizumab (Fasenra Pen) 30 MG/ML solution auto-injector, Inject 1 mL under the skin into the appropriate area as directed Every 2 (Two) Months., Disp: 1 mL, Rfl: 5    budesonide-formoterol (SYMBICORT) 160-4.5 MCG/ACT inhaler, Inhale 2 puffs 2 (Two) Times a Day., Disp: , Rfl:     buprenorphine (SUBUTEX) 8 MG sublingual tablet SL tablet, Place 3 tablets under the tongue Daily., Disp: 90 tablet, Rfl: 1    clonazePAM (KlonoPIN) 1 MG tablet, Take 1 tablet by mouth 2 (Two) Times a Day., Disp: , Rfl: 0    esomeprazole (nexIUM) 40 MG capsule, Take 1 capsule by mouth 2 (Two) Times a Day., Disp: 60 capsule, Rfl: 5    fluticasone (Flonase) 50 MCG/ACT nasal spray, 2 sprays into the nostril(s) as directed by provider Daily., Disp: 1 g, Rfl: 3    Forteo 600 MCG/2.4ML injection, Inject  under the skin into the appropriate area as directed., Disp: , Rfl:     furosemide (LASIX) 20 MG tablet, Take 1 tablet by mouth Daily., Disp: , Rfl:     GNP Calcium Citrate +D3 315-6.25 MG-MCG tablet tablet, TAKE ONE TABLET BY MOUTH TWICE DAILY AS DIRECTED, Disp: , Rfl:     HYDROcodone-acetaminophen (NORCO) 7.5-325 MG per tablet, Take 1 tablet by mouth Dose Adjusted By Provider As Needed for 5 days., Disp: 15 tablet, Rfl: 0    ibuprofen (ADVIL,MOTRIN) 600 MG tablet, TAKE ONE TABLET BY MOUTH EVERY 6 HOURS AS  NEEDED FOR PAIN not relieved by tylenol, Disp: , Rfl:     ipratropium-albuterol (DUO-NEB) 0.5-2.5 mg/3 ml nebulizer, USE 1 VIAL IN NEBULIZER FOUR TIMES DAILY AS NEEDED FOR 10 DAYS, Disp: , Rfl:     lamoTRIgine (LaMICtal) 200 MG tablet, Take 1 tablet by mouth Daily., Disp: 90 tablet, Rfl: 1    levoFLOXacin (Levaquin) 750 MG tablet, Take 1 tablet by mouth Daily., Disp: 7 tablet, Rfl: 0    levothyroxine (SYNTHROID, LEVOTHROID) 25 MCG tablet, Take 1 tablet by mouth Every Morning., Disp: , Rfl:     Linzess 290 MCG capsule capsule, Take 1 capsule by mouth Daily for 180 days., Disp: 90 capsule, Rfl: 1    metoclopramide (REGLAN) 10 MG tablet, Take 1 tablet (10 mg total) by mouth 3 (three) times a day., Disp: , Rfl:     ondansetron ODT (ZOFRAN-ODT) 8 MG disintegrating tablet, Place 1 tablet on the tongue Every 12 (Twelve) Hours As Needed for Nausea or Vomiting., Disp: , Rfl:     polyethylene glycol (MIRALAX) 17 GM/SCOOP powder, , Disp: , Rfl:     polyethylene glycol-electrolytes (NULYTELY) 420 g solution, Take per office instructions, Disp: , Rfl:     predniSONE (DELTASONE) 20 MG tablet, Take 1 tablet by mouth Daily., Disp: 7 tablet, Rfl: 0    promethazine (PHENERGAN) 25 MG tablet, Take 1 tablet (25 mg total) by mouth every 6 (six) hours if needed for nausea or vomiting., Disp: , Rfl:     promethazine-dextromethorphan (PROMETHAZINE-DM) 6.25-15 MG/5ML syrup, take 5ml BY MOUTH FOUR TIMES DAILY AS NEEDED FOR 12 DAYS (Patient not taking: Reported on 5/8/2025), Disp: , Rfl:     Rimegepant Sulfate (Nurtec) 75 MG tablet dispersible tablet, Take 1 tablet by mouth As Needed (Migraine)., Disp: , Rfl:     tiZANidine (ZANAFLEX) 4 MG tablet, Take 2 tablets by mouth Every 8 (Eight) Hours As Needed for Muscle Spasms., Disp: , Rfl: 5    Current Facility-Administered Medications:     lactulose (CHRONULAC) 10 GM/15ML solution 20 g, 20 g, Oral, Daily, Everett Tapia MD    Visit Diagnosis/Orders Placed This Visit:    ICD-10-CM ICD-9-CM   1.  Opioid dependence in remission  F11.21 304.03   2. Post traumatic stress disorder (PTSD)  F43.10 309.81          Assessment and Plan   Diagnoses and all orders for this visit:    1. Opioid dependence in remission (Primary)    2. Post traumatic stress disorder (PTSD)        Assessment & Plan  Problems:  - Postoperative pain  - Weight loss  - Stress    Content of Therapy:  During the session, the patient discussed her recent open abdominal surgery, which resulted in significant pain and complications. She expressed frustration with the minimal relief provided by hydrocodone and concerns about her weight loss. The patient also shared her struggles with stress, particularly related to her son's substance use issues and setting boundaries. The importance of reframing thoughts, exploring feelings, and addressing conflict resolution was emphasized.    Clinical Impression:  The patient is experiencing considerable postoperative pain following her abdominal surgery, which has not been adequately managed with hydrocodone. She has lost weight due to difficulty eating, now weighing 123 pounds, down from her usual 130-133 pounds. Stress levels are high, primarily due to her son's substance use and the challenges of setting boundaries. Despite these difficulties, she is receiving support from her fiancé and remains committed to her recovery.    Therapeutic Intervention:  - Cognitive-behavioral strategies were employed to help the patient reframe negative thoughts and manage stress.  - Psychoeducation was provided on the importance of protein intake for muscle recovery and weight gain.  - Mindfulness exercises were introduced to help the patient cope with pain and stress.    Plan:  - Continue taking hydrocodone as needed for pain management.  - Focus on increasing protein intake, including the use of protein shakes.  - Maintain boundaries with her son to protect her mental health.  - Seek support from her fiancé and consider  counseling if stress remains high.    Follow-up:  - Next appointment scheduled for 07/11/2025 at 11:00 AM via telehealth.      Notes & Risk Factors:  - Risk factors include potential for inadequate pain management and high stress levels.  - Protective factors include support from her fiancé and commitment to recovery.    PROCEDURE  The patient underwent an open abdominal surgery on Monday.      Safety: No acute safety concerns  Risk Assessment: Risk of self-harm acutely is low. Risk of self-harm chronically is also low, but could be further elevated in the event of treatment noncompliance and/or AODA.    Crisis Plan:  Symptoms and/or behaviors to indicate a crisis: Abuse of substances    What calming techniques or other strategies will patient use to de-escalate and stay safe: slow down, breathe, visualize calming self, think it though, listen to music, change focus, take a walk    Who is one person patient can contact to assist with de-escalation? Mother Leigh    Treatment Plan/Goals: Patient will continue supportive psychotherapy efforts and medication regimen as prescribed. Therapist will provide Cognitive Behavioral Therapy to assist patient in improving functioning and gaining coping skills, maintaining stability, and avoiding decompensation and the need for higher level of care. Plan for treatment was discussed during today's visit. Patient acknowledged and verbally consented to continue with current treatment plan and was educated on the importance of compliance with treatment and follow-up appointments.     Patient will contact this office, call 911 or present to the nearest emergency room should suicidal or homicidal ideations occur.     Follow Up:   Return in 59 weeks (on 7/10/2026) for Next scheduled follow up, Video visit.    Patient's questions were answered.  Thank you for allowing me to participate in the care of this patient.  Dictated Utilizing Dragon Dictation. Please note that portions of this  note were completed with a voice recognition program. Part of this note may be an electronic transcription/translation of spoken language to printed text using the Dragon Dictation System.      Patient or patient representative verbalized consent for the use of Ambient Listening during the visit with  GIOVANY Mckeon for chart documentation. 5/23/2025  11:40 EDT    [facility]   GIOVANY Mckeon  05/23/25  11:41 EDT

## 2025-06-03 ENCOUNTER — TELEPHONE (OUTPATIENT)
Age: 55
End: 2025-06-03
Payer: COMMERCIAL

## 2025-06-03 NOTE — TELEPHONE ENCOUNTER
PT CALLED STATES HAD STAPLES REMOVED YESTERDAY AND IS STILL HAVING QUITE A BIT OF PAIN. PT STATES SHE IS OUT OF SUBOXONE BECAUSE SHE WAS USING 3 A DAY PT STATES HAS REFILL FOR SUBOXONE  AT PHARMACY FOR 48 PILLS BUT PHARMACY WONT LET HER HAVE UNLESS PROVIDER CALLS PHARMACY PLEASE ADVISE

## 2025-06-05 ENCOUNTER — OFFICE VISIT (OUTPATIENT)
Age: 55
End: 2025-06-05
Payer: COMMERCIAL

## 2025-06-05 VITALS
BODY MASS INDEX: 24.17 KG/M2 | WEIGHT: 128 LBS | HEART RATE: 91 BPM | DIASTOLIC BLOOD PRESSURE: 70 MMHG | SYSTOLIC BLOOD PRESSURE: 121 MMHG | OXYGEN SATURATION: 96 % | HEIGHT: 61 IN

## 2025-06-05 DIAGNOSIS — F11.21 OPIOID DEPENDENCE IN REMISSION: Primary | ICD-10-CM

## 2025-06-05 DIAGNOSIS — K31.84 GASTROPARESIS: ICD-10-CM

## 2025-06-05 DIAGNOSIS — F43.10 POST TRAUMATIC STRESS DISORDER (PTSD): ICD-10-CM

## 2025-06-05 RX ORDER — BUPRENORPHINE 8 MG/1
24 TABLET SUBLINGUAL DAILY
Qty: 84 TABLET | Refills: 1 | Status: SHIPPED | OUTPATIENT
Start: 2025-06-05

## 2025-06-05 RX ORDER — POTASSIUM CHLORIDE 750 MG/1
10 TABLET, EXTENDED RELEASE ORAL DAILY
COMMUNITY

## 2025-06-05 RX ORDER — AMITRIPTYLINE HYDROCHLORIDE 100 MG/1
100 TABLET ORAL NIGHTLY
COMMUNITY
Start: 2025-05-07 | End: 2025-06-05

## 2025-06-05 NOTE — PROGRESS NOTES
Office  Note     Patient Name: Lamar Vee  : 1970   MRN: [id number]     Referring Provider: Everett Tapia MD    Chief Complaint: Substance use    History of Present Illness:   HPI    Lamar Vee is a 54 y.o. female who is here today for follow up related to her opioid use disorder.  She is using her buprenorphine 8 mg tablets 2 in the morning 1 in the afternoon typically.  She does had surgery 2 weeks ago so she was using 1 pill every 8 hours to try to help with analgesia.    She had a nerve stimulator placed to treat her gastroparesis which is working quite well.  Unfortunately she just has developed some hyperalgesia from her long-term opiate use and she had a lot of pain after that procedure so I helped with her perioperative pain management by extending her hydrocodone and tapering her off of that by the last week.  She has not had no relapses on opiates and her pain is trivial now especially since they got the 15 staples out of her abdomen.  Fortunately the nerve stimulator is working great that she is back to eating more complex foods which should improve her daily constipation since now she can eat more fiber.  Her weight now is 123 in the last 10 years she maxed out at 140 so the gastric paresthesias did not cause her to become cachectic but it got to the point where even fluids made her have nausea and vomiting and abdominal pain right up to the time of her surgery so she is in a much better mood today as far as her quality life.    The pain of her gastroparesis did not cause any craving for more opiates.  She just wanted to hurt less.  She is afraid that when they replaced the battery she is going to have the same level of pain again but we reassured her that replacing the battery is a much simpler procedure.  They will just need to access her subcutaneous space to get to the nerve transmitter body itself to replace the battery.  They do not have to enter her abdominal cavity  again like they did 2 weeks ago when they placed wires on her stomach wall.         Triggers: No triggers as of today.    Cravings: None    Relapse Prevention: Relapse prevention consists of coming back to see the doctor in 2 months.  She will continue to see the therapist every 1 to 2 months with her last visit on May 23 to the next visit scheduled for July 11.    UDS Confirmation: Pending    LEWIS (PDMP) Reviewed for Current/Active Medications      Past Surgical History:  Past Surgical History:   Procedure Laterality Date    ABDOMINAL SURGERY  2000    APPENDECTOMY      BACK SURGERY      lumbar    BRONCHOSCOPY N/A 01/20/2023    Procedure: BRONCHOSCOPY WITH BIOPSIES AND BAL;  Surgeon: Victor M Field DO;  Location: [facility] ENDOSCOPY;  Service: Pulmonary;  Laterality: N/A;  SARCOIDOSIS AND RECURRENT LUNG INFECTIONS    BRONCHOSCOPY N/A 05/02/2023    Procedure: BRONCHOSCOPY WITH BRONCHOALVEOLAR LAVAGE, BIOPSIES, BRUSHING, BRONCHIAL WASHING;  Surgeon: Fawad Christian MD;  Location: [facility] ENDOSCOPY;  Service: Pulmonary;  Laterality: N/A;  RECURRENT PNEUMONIA    BRONCHOSCOPY Bilateral 05/03/2024    Procedure: BRONCHOSCOPY WITH BRONCHIOALVEOLAR LAVAGE;  Surgeon: Victor M Field DO;  Location: [facility] ENDOSCOPY;  Service: Pulmonary;  Laterality: Bilateral;  MYCROBACTERIUM AVIUM    CHOLECYSTECTOMY      COLONOSCOPY      COLONOSCOPY N/A 12/27/2024    Procedure: COLONOSCOPY;  Surgeon: Gabe Ritchie MD;  Location: [facility] ENDOSCOPY;  Service: Gastroenterology;  Laterality: N/A;  incomplete poor prep    COLONOSCOPY N/A 1/24/2025    Procedure: COLONOSCOPY WITH POLYPECTOMIES HOT AND COLD SNARE, INJECTION ELEVIEW, CLIP PLACEMENT X 1;  Surgeon: Gabe Ritchie MD;  Location: [facility] ENDOSCOPY;  Service: Gastroenterology;  Laterality: N/A;  COLON POLYPS    ENDOSCOPY N/A 09/27/2024    Procedure: ESOPHAGOGASTRODUODENOSCOPY with biopsy;  Surgeon: Gabe Ritchie MD;  Location: [facility];   Service: Gastroenterology;  Laterality: N/A;  hiatal hernia, retained food    ENDOSCOPY N/A 12/27/2024    Procedure: ESOPHAGOGASTRODUODENOSCOPY;  Surgeon: Gabe Ritchie MD;  Location: [facility] ENDOSCOPY;  Service: Gastroenterology;  Laterality: N/A;  gastric bx    FACIAL COSMETIC SURGERY      HYSTERECTOMY      SHOULDER SURGERY      spur removal    TONSILLECTOMY AND ADENOIDECTOMY      UPPER GASTROINTESTINAL ENDOSCOPY      12:2024    WISDOM TOOTH EXTRACTION         Problem List:  Patient Active Problem List   Diagnosis    Right knee pain    Patellar instability of right knee    Pulmonary infiltrate    Eosinophilic asthma    Pulmonary sarcoidosis    Pneumonia    Mycobacterium avium complex    Chest congestion    Severe persistent asthma    Gastroesophageal reflux disease    Family history of colon cancer in mother    Chronic idiopathic constipation    Encounter for screening for malignant neoplasm of colon       Allergy:   Allergies   Allergen Reactions    Reglan [Metoclopramide] Hives, Mental Status Change and Confusion    Naloxone Hives    Oxycodone-Acetaminophen Hives    Tetanus Toxoids Unknown - Low Severity     CHILDHOOD REACTION      Buprenorphine-Naloxone Hives    Morphine Headache    Pentazocine Hives    Tyloxapol Hives        Current Medications:   Current Outpatient Medications   Medication Sig Dispense Refill    albuterol sulfate  (90 Base) MCG/ACT inhaler INHALE TWO PUFFS BY MOUTH EVERY 4 HOURS 8.5 g 11    Benralizumab (Fasenra Pen) 30 MG/ML solution auto-injector Inject 1 mL under the skin into the appropriate area as directed Every 2 (Two) Months. 1 mL 5    budesonide-formoterol (SYMBICORT) 160-4.5 MCG/ACT inhaler Inhale 2 puffs 2 (Two) Times a Day.      buprenorphine (SUBUTEX) 8 MG sublingual tablet SL tablet Place 3 tablets under the tongue Daily. 84 tablet 1    clonazePAM (KlonoPIN) 1 MG tablet Take 1 tablet by mouth 2 (Two) Times a Day.  0    esomeprazole (nexIUM) 40 MG capsule Take 1  capsule by mouth 2 (Two) Times a Day. 60 capsule 5    fluticasone (Flonase) 50 MCG/ACT nasal spray 2 sprays into the nostril(s) as directed by provider Daily. 1 g 3    Forteo 600 MCG/2.4ML injection Inject  under the skin into the appropriate area as directed.      furosemide (LASIX) 20 MG tablet Take 1 tablet by mouth Daily.      GNP Calcium Citrate +D3 315-6.25 MG-MCG tablet tablet TAKE ONE TABLET BY MOUTH TWICE DAILY AS DIRECTED      ibuprofen (ADVIL,MOTRIN) 600 MG tablet TAKE ONE TABLET BY MOUTH EVERY 6 HOURS AS NEEDED FOR PAIN not relieved by tylenol      ipratropium-albuterol (DUO-NEB) 0.5-2.5 mg/3 ml nebulizer USE 1 VIAL IN NEBULIZER FOUR TIMES DAILY AS NEEDED FOR 10 DAYS      lamoTRIgine (LaMICtal) 200 MG tablet Take 1 tablet by mouth Daily. 90 tablet 1    levoFLOXacin (Levaquin) 750 MG tablet Take 1 tablet by mouth Daily. 7 tablet 0    Linzess 290 MCG capsule capsule Take 1 capsule by mouth Daily for 180 days. 90 capsule 1    metoclopramide (REGLAN) 10 MG tablet Take 1 tablet (10 mg total) by mouth 3 (three) times a day.      ondansetron ODT (ZOFRAN-ODT) 8 MG disintegrating tablet Place 1 tablet on the tongue Every 12 (Twelve) Hours As Needed for Nausea or Vomiting.      polyethylene glycol (MIRALAX) 17 GM/SCOOP powder       polyethylene glycol-electrolytes (NULYTELY) 420 g solution Take per office instructions      potassium chloride 10 MEQ CR tablet Take 1 tablet by mouth Daily.      predniSONE (DELTASONE) 20 MG tablet Take 1 tablet by mouth Daily. 7 tablet 0    promethazine (PHENERGAN) 25 MG tablet Take 1 tablet (25 mg total) by mouth every 6 (six) hours if needed for nausea or vomiting.      promethazine-dextromethorphan (PROMETHAZINE-DM) 6.25-15 MG/5ML syrup       Rimegepant Sulfate (Nurtec) 75 MG tablet dispersible tablet Take 1 tablet by mouth As Needed (Migraine).      tiZANidine (ZANAFLEX) 4 MG tablet Take 2 tablets by mouth Every 8 (Eight) Hours As Needed for Muscle Spasms.  5    amitriptyline  (ELAVIL) 100 MG tablet Take 1 tablet by mouth every night at bedtime.      levothyroxine (SYNTHROID, LEVOTHROID) 25 MCG tablet Take 1 tablet by mouth Every Morning.       Current Facility-Administered Medications   Medication Dose Route Frequency Provider Last Rate Last Admin    lactulose (CHRONULAC) 10 GM/15ML solution 20 g  20 g Oral Daily Everett Tapia MD           Past Medical History:  Past Medical History:   Diagnosis Date    Anxiety and depression     Asthma     Disease of thyroid gland     Family history of colon cancer in mother 10/08/2024    Gastroparesis     H/O: substance abuse     opiates    Melanoma 1992    Migraine     Neuropathy     Osteoporosis     Sarcoidosis     Seasonal allergies        Social History:  Social History     Socioeconomic History    Marital status:     Number of children: 2    Highest education level: Associate degree: academic program   Tobacco Use    Smoking status: Never     Passive exposure: Never    Smokeless tobacco: Never   Vaping Use    Vaping status: Never Used   Substance and Sexual Activity    Alcohol use: Not Currently    Drug use: Yes     Types: Hydrocodone, Hydromorphone, Oxycodone     Comment: HX OPIATE ABUSE- 15 YRS AGO    Sexual activity: Yes     Partners: Male     Birth control/protection: None, Hysterectomy       Social History     Social History Narrative    Social History:    Social Support: VJ    Residence: living setting HOME with VJ    Current Employment: [employer]    Education: [employer]    Learning Disabilities: NONE    Legal history: NONE    Hobbies/interests: NOTHING    Protestant: Roman Catholic    Exercise: SOME    Dietary issues: NONE    Sleep issues: NONE IS ON MEDICATION    Caffeine intake: 2 PEPSI A DAY     history: NONE          Family History:  Family History   Problem Relation Age of Onset    Colon cancer Mother 50    Cancer Mother     Cancer Maternal Aunt     Heart disease Maternal Aunt     Saniya  Hyperthermia Neg Hx          Subjective      Review of Systems:   Review of Systems    PHQ-9 Score:       TIARRA-7 Score:        Patient History:   The following portions of the patient's history were reviewed and updated as appropriate: allergies, current medications, past family history, past medical history, past social history, past surgical history and problem list.     Social:  Social History     Socioeconomic History    Marital status:     Number of children: 2    Highest education level: Associate degree: academic program   Tobacco Use    Smoking status: Never     Passive exposure: Never    Smokeless tobacco: Never   Vaping Use    Vaping status: Never Used   Substance and Sexual Activity    Alcohol use: Not Currently    Drug use: Yes     Types: Hydrocodone, Hydromorphone, Oxycodone     Comment: HX OPIATE ABUSE- 15 YRS AGO    Sexual activity: Yes     Partners: Male     Birth control/protection: None, Hysterectomy       Medications:     Current Outpatient Medications:     albuterol sulfate  (90 Base) MCG/ACT inhaler, INHALE TWO PUFFS BY MOUTH EVERY 4 HOURS, Disp: 8.5 g, Rfl: 11    Benralizumab (Fasenra Pen) 30 MG/ML solution auto-injector, Inject 1 mL under the skin into the appropriate area as directed Every 2 (Two) Months., Disp: 1 mL, Rfl: 5    budesonide-formoterol (SYMBICORT) 160-4.5 MCG/ACT inhaler, Inhale 2 puffs 2 (Two) Times a Day., Disp: , Rfl:     buprenorphine (SUBUTEX) 8 MG sublingual tablet SL tablet, Place 3 tablets under the tongue Daily., Disp: 84 tablet, Rfl: 1    clonazePAM (KlonoPIN) 1 MG tablet, Take 1 tablet by mouth 2 (Two) Times a Day., Disp: , Rfl: 0    esomeprazole (nexIUM) 40 MG capsule, Take 1 capsule by mouth 2 (Two) Times a Day., Disp: 60 capsule, Rfl: 5    fluticasone (Flonase) 50 MCG/ACT nasal spray, 2 sprays into the nostril(s) as directed by provider Daily., Disp: 1 g, Rfl: 3    Forteo 600 MCG/2.4ML injection, Inject  under the skin into the appropriate area as  directed., Disp: , Rfl:     furosemide (LASIX) 20 MG tablet, Take 1 tablet by mouth Daily., Disp: , Rfl:     GNP Calcium Citrate +D3 315-6.25 MG-MCG tablet tablet, TAKE ONE TABLET BY MOUTH TWICE DAILY AS DIRECTED, Disp: , Rfl:     ibuprofen (ADVIL,MOTRIN) 600 MG tablet, TAKE ONE TABLET BY MOUTH EVERY 6 HOURS AS NEEDED FOR PAIN not relieved by tylenol, Disp: , Rfl:     ipratropium-albuterol (DUO-NEB) 0.5-2.5 mg/3 ml nebulizer, USE 1 VIAL IN NEBULIZER FOUR TIMES DAILY AS NEEDED FOR 10 DAYS, Disp: , Rfl:     lamoTRIgine (LaMICtal) 200 MG tablet, Take 1 tablet by mouth Daily., Disp: 90 tablet, Rfl: 1    levoFLOXacin (Levaquin) 750 MG tablet, Take 1 tablet by mouth Daily., Disp: 7 tablet, Rfl: 0    Linzess 290 MCG capsule capsule, Take 1 capsule by mouth Daily for 180 days., Disp: 90 capsule, Rfl: 1    metoclopramide (REGLAN) 10 MG tablet, Take 1 tablet (10 mg total) by mouth 3 (three) times a day., Disp: , Rfl:     ondansetron ODT (ZOFRAN-ODT) 8 MG disintegrating tablet, Place 1 tablet on the tongue Every 12 (Twelve) Hours As Needed for Nausea or Vomiting., Disp: , Rfl:     polyethylene glycol (MIRALAX) 17 GM/SCOOP powder, , Disp: , Rfl:     polyethylene glycol-electrolytes (NULYTELY) 420 g solution, Take per office instructions, Disp: , Rfl:     potassium chloride 10 MEQ CR tablet, Take 1 tablet by mouth Daily., Disp: , Rfl:     predniSONE (DELTASONE) 20 MG tablet, Take 1 tablet by mouth Daily., Disp: 7 tablet, Rfl: 0    promethazine (PHENERGAN) 25 MG tablet, Take 1 tablet (25 mg total) by mouth every 6 (six) hours if needed for nausea or vomiting., Disp: , Rfl:     promethazine-dextromethorphan (PROMETHAZINE-DM) 6.25-15 MG/5ML syrup, , Disp: , Rfl:     Rimegepant Sulfate (Nurtec) 75 MG tablet dispersible tablet, Take 1 tablet by mouth As Needed (Migraine)., Disp: , Rfl:     tiZANidine (ZANAFLEX) 4 MG tablet, Take 2 tablets by mouth Every 8 (Eight) Hours As Needed for Muscle Spasms., Disp: , Rfl: 5    amitriptyline  "(ELAVIL) 100 MG tablet, Take 1 tablet by mouth every night at bedtime., Disp: , Rfl:     levothyroxine (SYNTHROID, LEVOTHROID) 25 MCG tablet, Take 1 tablet by mouth Every Morning., Disp: , Rfl:     Current Facility-Administered Medications:     lactulose (CHRONULAC) 10 GM/15ML solution 20 g, 20 g, Oral, Daily, Everett Tapia MD    Objective     Physical Exam:  Physical Exam    Vital Signs:   Vitals:    06/05/25 0933   BP: 121/70   Pulse: 91   SpO2: 96%   Weight: 58.1 kg (128 lb)   Height: 154.9 cm (61\")       Pill count:  (suboxone 39 pills)    Body mass index is 24.19 kg/m².     MENTAL STATUS EXAM   General Appearance:  Cleanly groomed and dressed  Eye Contact:  Good eye contact  Attitude:  Cooperative  Motor Activity:  Normal gait, posture  Muscle Strength:  Normal  Speech:  Normal rate, tone, volume  Language:  Spontaneous  Mood and affect:  Normal, pleasant  Hopelessness:  Denies  Loneliness: Denies  Thought Process:  Logical  Associations/ Thought Content:  No delusions  Hallucinations:  None  Suicidal Ideations:  Not present  Homicidal Ideation:  Not present          Assessment / Plan      Diagnoses and all orders for this visit:    1. Opioid dependence in remission (Primary)  -     Behavioral Health Full Screen and Definitive Testing ([facility]) -; Future  -     buprenorphine (SUBUTEX) 8 MG sublingual tablet SL tablet; Place 3 tablets under the tongue Daily.  Dispense: 84 tablet; Refill: 1    2. Post traumatic stress disorder (PTSD)    3. Gastroparesis           PLAN:  Safety: No acute safety concerns  Risk Assessment: Risk of self-harm acutely is low. Risk of self-harm chronically is also low, but could be further elevated in the event of treatment noncompliance and/or AODA.    TREATMENT PLAN/GOALS: Continue supportive psychotherapy efforts and medications as indicated. Treatment and medication options discussed during today's visit. Patient acknowledged and verbally consented to continue with current " treatment plan and was educated on the importance of compliance with treatment and follow-up appointments.    MEDICATION ISSUES:  LEWIS reviewed as expected.  Discussed medication options and treatment plan of prescribed medication as well as the risks, benefits, and side effects including potential falls, possible impaired driving and metabolic adversities among others. Patient is agreeable to call the office with any worsening of symptoms or onset of side effects. Patient is agreeable to call 911 or go to the nearest ER should he/she begin having SI/HI. No medication side effects or related complaints today.     Return in about 2 months (around 8/5/2025).             This document has been electronically signed by Everett Tapia MD  June 5, 2025 10:10 EDT      Part of this note may be an electronic transcription/translation of spoken language to printed text using the Dragon Dictation System.

## 2025-06-07 LAB
6-ACETYLMORPHINE: NOT DETECTED NG/ML
7- AMINOCLONAZEPAM: 290 NG/ML
ALCOHOL, ETHYL: NOT DETECTED MG/DL
ALPHA-HYDROXYALPRAZOLAM: NOT DETECTED NG/ML
ALPRAZ SPEC-MCNC: NOT DETECTED NG/ML
AMPHETAMINE: NOT DETECTED NG/ML
BENZODIAZ BLD QL: NOT DETECTED NG/ML
BUPRENORPHINE SAL CFM-MCNC: DETECTED NG/ML
COCAINE METABOLITE: NOT DETECTED NG/ML
CREATININE: 48 MG/DL
EDDP SERPL QL: NOT DETECTED NG/ML
FENTANYL-EIA: NOT DETECTED NG/ML
LORAZEPAM: NOT DETECTED NG/ML
METHAMPHETAMINE: NOT DETECTED NG/ML
NORDIAZEPAM: NOT DETECTED NG/ML
OPIATES: NOT DETECTED NG/ML
OXAZEPAM: NOT DETECTED NG/ML
OXIDANTS: NOT DETECTED UG/ML
OXYCODONE: NOT DETECTED NG/ML
PCP: NOT DETECTED NG/ML
PH: 7.5 (ref 4.5–9)
REF LAB TEST METHOD: NORMAL
SPECIFIC GRAVITY, QUAN: 1.01 (ref 1–1.03)
TEMAZEPAM: NOT DETECTED NG/ML
THC: NOT DETECTED NG/ML

## 2025-07-11 ENCOUNTER — TELEMEDICINE (OUTPATIENT)
Age: 55
End: 2025-07-11
Payer: COMMERCIAL

## 2025-07-11 DIAGNOSIS — F11.21 OPIOID DEPENDENCE IN REMISSION: Primary | ICD-10-CM

## 2025-07-11 NOTE — PROGRESS NOTES
[facility]   [address]  [place] [address]  [phone]     Referring physician/provider: No ref. provider found   PCP: Everett Tapia MD    Telehealth Encounter  Date of service: 7/11/2025    Time in: 11:00 AM  Time Out: 11:41 AM    Chief Complaint  Substance Use    Mode of Visit: Video   Location of patient: -HOME-   Location of provider: +[facility]+   You have chosen to receive care through a telehealth visit.   The patient has signed the video visit consent form.   The visit included audio and video interaction. No technical issues occurred during this visit.     This provider is located at [facility] using a secure [employer] Video Visit through [employer]. Patient is being seen remotely via telehealth at home address in [place] and stated they are in a secure environment for this session. The patient's condition being diagnosed/treated is appropriate for telemedicine. The provider identified herself as well as her credentials. The patient, and/or patients guardian, consent to be seen remotely, and when consent is given they understand that the consent allows for patient identifiable information to be sent to a third party as needed. They may refuse to be seen remotely at any time. The electronic data is encrypted and password protected, and the patient and/or guardian has been advised of the potential risks to privacy not withstanding such measures.     You have chosen to receive care through a telehealth visit.  Do you consent to use a video/audio connection for your medical care today? [x]  Yes     []  No     Referring Provider: Everett Tapia MD    Progress Note       History of Present Illness:     The patient is a 54-year-old female being seen today for a follow-up psychotherapy session. She reports no new use, cravings, or obstacles to her recovery. She is being treated for her opioid use disorder in sustained  remission with medication-assisted treatment and is currently on buprenorphine 8 mg tablets, taking two in the morning and one in the afternoon. She is recovering well from her recent surgery, where a nerve stimulator was inserted due to gastroparesis. However, she reports possible complications with the device, having recently undergone an X-ray which suggested it may have flipped over, although it still appears to be functioning.    The patient has returned to work but reports stress related to her current relationship with her boyfriend of four years. They have had frequent disagreements regarding his relationship with his mother, whom she believes disapproves of her. This has led to a separation between them. She is taking time to consider whether she wants to continue the relationship and reevaluate their plans to eventually get . While she acknowledges that he was very supportive during her illness, she feels that his relationship with his mother is a significant issue that she may not be willing to tolerate in the long term.    The patient indicated that she took a Klonopin this morning due to experiencing severe anxiety, as her boyfriend has been incessantly contacting her, wanting to resolve the problem so he can return home. We discussed the importance of not immediately resorting to medication but instead utilizing coping skills first, and then turning to medication if those are unsuccessful, rather than making medication the first option in times of stress. Overall, she is doing well, though she is struggling with some emotional issues related to the separation from her fiancé. She seems determined to continue working, living her life, and addressing any health issues as needed.    ICD-10-CM ICD-9-CM   1. Opioid dependence in remission  F11.21 304.03          Clinical Maneuvering/Intervention:   Assisted patient in processing above session content; acknowledged and normalized patient’s thoughts,  feelings, and concerns by utilizing a person-centered approach in efforts to build appropriate rapport and a positive therapeutic relationship with open and honest communication. Processed and rationalized patient's thoughts and feelings regarding her relationship with her boyfriend and the associated stress. Discussed triggers associated with patient's anxiety and stress at work. Also discussed coping skills for patient to implement such as: Practicing mindfulness meditation daily to manage stress, engaging in regular physical exercise such as walking or yoga to improve mood, and maintaining a balanced diet for overall well-being. Journaling can help process emotions, while deep breathing exercises can reduce anxiety. Connecting with friends, family, or support groups provides social support, and pursuing hobbies like painting or gardening can bring relaxation. Additionally, participation in 12-step programs like Alcoholics Anonymous (AA) or Narcotics Anonymous (NA) is encouraged, along with obtaining a sponsor for guidance and accountability throughout the recovery journey.    Allowed patient to freely discuss issues without interruption or judgment. Provided safe, confidential environment to facilitate the development of positive therapeutic relationship and encourage open, honest communication. Assisted patient in identifying risk factors which would indicate the need for higher level of care including thoughts to harm self or others and/or self-harming behavior and encouraged patient to contact this office, call 911, or present to the nearest emergency room should any of these events occur. Discussed crisis intervention services and means to access. Patient adamantly and convincingly denies current suicidal or homicidal ideation or perceptual disturbance.    PHQ-Score Total:  PHQ-9 Total Score:      TIARRA-7 Score Total:  TIARRA-7 Score:                  Mental Status Exam:   Hygiene:   good  Cooperation:   Cooperative  Eye Contact:  Good  Psychomotor Behavior:  Aggitated  Affect:  Appropriate  Mood: irritable  Speech:  Normal  Thought Process:  Goal directed  Thought Content:  Normal  Suicidal:  None  Homicidal:  None  Hallucinations:  None  Delusion:  None  Memory:  Intact  Orientation:  Person, Place, Time, and Situation  Reliability:  good  Insight:  Good  Judgement:  Good  Impulse Control:  Good  Physical/Medical Issues:  Yes       Patient's Support Network Includes:  daughter and son    Functional Status: Mild impairment     Progress toward goal: Not at goal    Prognosis: Good with Ongoing Treatment     Medications:     Current Outpatient Medications:     albuterol sulfate  (90 Base) MCG/ACT inhaler, INHALE TWO PUFFS BY MOUTH EVERY 4 HOURS, Disp: 8.5 g, Rfl: 11    amitriptyline (ELAVIL) 100 MG tablet, Take 1 tablet by mouth every night at bedtime., Disp: , Rfl:     Benralizumab (Fasenra Pen) 30 MG/ML solution auto-injector, Inject 1 mL under the skin into the appropriate area as directed Every 2 (Two) Months., Disp: 1 mL, Rfl: 5    budesonide-formoterol (SYMBICORT) 160-4.5 MCG/ACT inhaler, Inhale 2 puffs 2 (Two) Times a Day., Disp: , Rfl:     buprenorphine (SUBUTEX) 8 MG sublingual tablet SL tablet, Place 3 tablets under the tongue Daily., Disp: 84 tablet, Rfl: 1    clonazePAM (KlonoPIN) 1 MG tablet, Take 1 tablet by mouth 2 (Two) Times a Day., Disp: , Rfl: 0    esomeprazole (nexIUM) 40 MG capsule, Take 1 capsule by mouth 2 (Two) Times a Day., Disp: 60 capsule, Rfl: 5    fluticasone (Flonase) 50 MCG/ACT nasal spray, 2 sprays into the nostril(s) as directed by provider Daily., Disp: 1 g, Rfl: 3    Forteo 600 MCG/2.4ML injection, Inject  under the skin into the appropriate area as directed., Disp: , Rfl:     furosemide (LASIX) 20 MG tablet, Take 1 tablet by mouth Daily., Disp: , Rfl:     GNP Calcium Citrate +D3 315-6.25 MG-MCG tablet tablet, TAKE ONE TABLET BY MOUTH TWICE DAILY AS DIRECTED, Disp: , Rfl:      ibuprofen (ADVIL,MOTRIN) 600 MG tablet, TAKE ONE TABLET BY MOUTH EVERY 6 HOURS AS NEEDED FOR PAIN not relieved by tylenol, Disp: , Rfl:     ipratropium-albuterol (DUO-NEB) 0.5-2.5 mg/3 ml nebulizer, USE 1 VIAL IN NEBULIZER FOUR TIMES DAILY AS NEEDED FOR 10 DAYS, Disp: , Rfl:     lamoTRIgine (LaMICtal) 200 MG tablet, Take 1 tablet by mouth Daily., Disp: 90 tablet, Rfl: 1    levoFLOXacin (Levaquin) 750 MG tablet, Take 1 tablet by mouth Daily., Disp: 7 tablet, Rfl: 0    levothyroxine (SYNTHROID, LEVOTHROID) 25 MCG tablet, Take 1 tablet by mouth Every Morning., Disp: , Rfl:     Linzess 290 MCG capsule capsule, Take 1 capsule by mouth Daily for 180 days., Disp: 90 capsule, Rfl: 1    metoclopramide (REGLAN) 10 MG tablet, Take 1 tablet (10 mg total) by mouth 3 (three) times a day., Disp: , Rfl:     ondansetron ODT (ZOFRAN-ODT) 8 MG disintegrating tablet, Place 1 tablet on the tongue Every 12 (Twelve) Hours As Needed for Nausea or Vomiting., Disp: , Rfl:     polyethylene glycol (MIRALAX) 17 GM/SCOOP powder, , Disp: , Rfl:     polyethylene glycol-electrolytes (NULYTELY) 420 g solution, Take per office instructions, Disp: , Rfl:     potassium chloride 10 MEQ CR tablet, Take 1 tablet by mouth Daily., Disp: , Rfl:     predniSONE (DELTASONE) 20 MG tablet, Take 1 tablet by mouth Daily., Disp: 7 tablet, Rfl: 0    promethazine (PHENERGAN) 25 MG tablet, Take 1 tablet (25 mg total) by mouth every 6 (six) hours if needed for nausea or vomiting., Disp: , Rfl:     promethazine-dextromethorphan (PROMETHAZINE-DM) 6.25-15 MG/5ML syrup, , Disp: , Rfl:     Rimegepant Sulfate (Nurtec) 75 MG tablet dispersible tablet, Take 1 tablet by mouth As Needed (Migraine)., Disp: , Rfl:     tiZANidine (ZANAFLEX) 4 MG tablet, Take 2 tablets by mouth Every 8 (Eight) Hours As Needed for Muscle Spasms., Disp: , Rfl: 5    Current Facility-Administered Medications:     lactulose (CHRONULAC) 10 GM/15ML solution 20 g, 20 g, Oral, Daily, Everett Tapia,  MD    Visit Diagnosis/Orders Placed This Visit:    ICD-10-CM ICD-9-CM   1. Opioid dependence in remission  F11.21 304.03          Assessment and Plan   Diagnoses and all orders for this visit:    1. Opioid dependence in remission (Primary)        Assessment & Plan  Problems:  - Anxiety  - Relationship issues    Content of Therapy:  - Discussed the patient's relationship with her boyfriend, including recent conflicts and feelings of dishonesty and lack of support.  - Explored the impact of her boyfriend's relationship with his mother on their relationship.  - Addressed the patient's need for independence and her concerns about her boyfriend's expectations.  - Reframed thoughts about the value of the relationship and the importance of mutual respect and honesty.  - Discussed the patient's anxiety and use of Klonopin for management.    Clinical Impression:  The patient presents with significant anxiety exacerbated by relationship stress. She reports feeling unsupported and disrespected by her boyfriend, particularly regarding his relationship with his mother and his dishonesty. The patient demonstrates insight into her need for independence and the importance of having a partner who adds value to her life. She is currently managing her anxiety with Klonopin and reports taking it as needed. There are no indications of cravings or desires to use substances.    Therapeutic Intervention:  - Cognitive reframing to help the patient view her relationship from a different perspective.  - Encouraged the patient to take time apart from her boyfriend to reflect on her feelings and the future of the relationship.  - Discussed the importance of setting boundaries and maintaining independence.  - Advised the patient to consider improving her relationship with her boyfriend's mother, if possible.    Plan:  - Take time apart from her boyfriend to reflect on the relationship.  - Maintain independence and set clear boundaries.  - Ensure  that anyone allowed into her life adds value rather than stress.  - Update emergency contact information to reflect her daughter as the primary contact.  - Contact the office for a telehealth consultation if experiencing struggles or stress.    Follow-up:  - Next appointment scheduled for 09/12/2025 at 11:00 AM.    Notes & Risk Factors:  - No risk factors for harm to self or others identified during the session.  - Protective factors include the patient's independence and support from her daughter.      Safety: No acute safety concerns  Risk Assessment: Risk of self-harm acutely is low. Risk of self-harm chronically is also low, but could be further elevated in the event of treatment noncompliance and/or AODA.    Crisis Plan:  Symptoms and/or behaviors to indicate a crisis: Abuse of substances    What calming techniques or other strategies will patient use to de-escalate and stay safe: slow down, breathe, visualize calming self, think it though, listen to music, change focus, take a walk    Who is one person patient can contact to assist with de-escalation? Monki (chase)    Treatment Plan/Goals: Patient will continue supportive psychotherapy efforts and medication regimen as prescribed. Therapist will provide Cognitive Behavioral Therapy to assist patient in improving functioning and gaining coping skills, maintaining stability, and avoiding decompensation and the need for higher level of care. Plan for treatment was discussed during today's visit. Patient acknowledged and verbally consented to continue with current treatment plan and was educated on the importance of compliance with treatment and follow-up appointments.     Patient will contact this office, call 911 or present to the nearest emergency room should suicidal or homicidal ideations occur.     Follow Up:   Return in about 2 months (around 9/11/2025) for Next scheduled follow up, Video visit.    Patient's questions were answered.  Thank you for allowing  me to participate in the care of this patient.  Dictated Utilizing Dragon Dictation. Please note that portions of this note were completed with a voice recognition program. Part of this note may be an electronic transcription/translation of spoken language to printed text using the Dragon Dictation System.      Patient or patient representative verbalized consent for the use of Ambient Listening during the visit with  GIOVANY Mckeon for chart documentation. 7/11/2025  13:00 EDT    [facility]   GIOVANY Mckeon  07/11/25  13:02 EDT

## 2025-07-31 ENCOUNTER — TELEMEDICINE (OUTPATIENT)
Age: 55
End: 2025-07-31
Payer: COMMERCIAL

## 2025-07-31 DIAGNOSIS — K31.84 GASTROPARESIS: ICD-10-CM

## 2025-07-31 DIAGNOSIS — F11.21 OPIOID DEPENDENCE IN REMISSION: Primary | ICD-10-CM

## 2025-07-31 DIAGNOSIS — K21.00 GASTROESOPHAGEAL REFLUX DISEASE WITH ESOPHAGITIS WITHOUT HEMORRHAGE: ICD-10-CM

## 2025-07-31 DIAGNOSIS — F43.10 POST TRAUMATIC STRESS DISORDER (PTSD): ICD-10-CM

## 2025-07-31 RX ORDER — BUPRENORPHINE 8 MG/1
24 TABLET SUBLINGUAL DAILY
Qty: 84 TABLET | Refills: 1 | Status: SHIPPED | OUTPATIENT
Start: 2025-07-31

## 2025-07-31 RX ORDER — ESOMEPRAZOLE MAGNESIUM 40 MG/1
40 CAPSULE, DELAYED RELEASE ORAL 2 TIMES DAILY
Qty: 60 CAPSULE | Refills: 5 | Status: SHIPPED | OUTPATIENT
Start: 2025-07-31

## 2025-07-31 NOTE — PROGRESS NOTES
Telehealth Visit      This provider is located at [place], [place], using a secure Audio and Video  connection. Patient is being seen remotely via telehealth at [facility] in [place] where the patient is visiting her very ill aunt, and stated they are in a secure environment for this session. The patient's condition being diagnosed/treated is appropriate for telemedicine. The provider identified himself as well as his credentials. The patient, and/or patients guardian, consent to be seen remotely, and when consent is given they understand that the consent allows for patient identifiable information to be sent to a third party as needed. They may refuse to be seen remotely at any time. The electronic data is encrypted and password protected, and the patient and/or guardian has been advised of the potential risks to privacy not withstanding such measures.     Office  Note     Patient Name: Lamar Vee  : 1970   MRN: [id number]     Referring Provider: Everett Tapia MD    Chief Complaint: Substance use    History of Present Illness:   HPI    Lamar Vee is a 54 y.o. female who is here today for follow up related to her opiate use disorder.  She is using 3 of the buprenorphine tablets that are 8 mg each every day.  This dosage keeps her from having any opiate withdrawal or opiate craving.  She states she has not had any craving for opiates so she has not had any relapses on any addictive substances like she used to use.    I were doing a telehealth visit today because she is in bed to see his hospital right at the moment during this visit to attend critically ill aunt..  The patient herself is been struggling some with her gastroparesis with more nausea and vomiting a couple weeks ago she went last week to get her stimulator adjusted to hire voltage which has helped considerably secondarily she has had to talk to her gastroenterologist about her constipation which has gotten  worse again he is already taking Linzess at 290 mcg p.o. daily.  GI ordered a new constipation medicine patient's insurance pay for it.  She is due for a refill on her Nexium so we will provide that today as well.    She is doing her Forteo injections every day because of her osteoporosis.  She has been using that medicine since last year.  The patient had an appointment for the pulmonary office today but she had to cancel at the take care of her [place] rescheduled August 8.    The patient's mood is stable on her Lamictal which she is using for her chronic PTSD.  She takes 200 mg once a day every day.  She does not need to refill now but she will by the next visit in 2 months.      Triggers: None    Cravings: None    Relapse Prevention: She has a therapy appointment next with Nina here on September 12.  She just had an appointment with her 3 weeks ago.    UDS Confirmation: Negative    LEWIS (PDMP) Reviewed for Current/Active Medications      Past Surgical History:  Past Surgical History:   Procedure Laterality Date    ABDOMINAL SURGERY  2000    APPENDECTOMY      BACK SURGERY      lumbar    BRONCHOSCOPY N/A 01/20/2023    Procedure: BRONCHOSCOPY WITH BIOPSIES AND BAL;  Surgeon: Victor M Field DO;  Location: [facility] ENDOSCOPY;  Service: Pulmonary;  Laterality: N/A;  SARCOIDOSIS AND RECURRENT LUNG INFECTIONS    BRONCHOSCOPY N/A 05/02/2023    Procedure: BRONCHOSCOPY WITH BRONCHOALVEOLAR LAVAGE, BIOPSIES, BRUSHING, BRONCHIAL WASHING;  Surgeon: Fawad Christian MD;  Location: [facility] ENDOSCOPY;  Service: Pulmonary;  Laterality: N/A;  RECURRENT PNEUMONIA    BRONCHOSCOPY Bilateral 05/03/2024    Procedure: BRONCHOSCOPY WITH BRONCHIOALVEOLAR LAVAGE;  Surgeon: Victor M Field DO;  Location: [facility] ENDOSCOPY;  Service: Pulmonary;  Laterality: Bilateral;  MYCROBACTERIUM AVIUM    CHOLECYSTECTOMY      COLONOSCOPY      COLONOSCOPY N/A 12/27/2024    Procedure: COLONOSCOPY;  Surgeon: Gabe Ritchie  MD Lazaro;  Location: [facility] ENDOSCOPY;  Service: Gastroenterology;  Laterality: N/A;  incomplete poor prep    COLONOSCOPY N/A 1/24/2025    Procedure: COLONOSCOPY WITH POLYPECTOMIES HOT AND COLD SNARE, INJECTION ELEVIEW, CLIP PLACEMENT X 1;  Surgeon: Gabe Ritchie MD;  Location: [facility] ENDOSCOPY;  Service: Gastroenterology;  Laterality: N/A;  COLON POLYPS    ENDOSCOPY N/A 09/27/2024    Procedure: ESOPHAGOGASTRODUODENOSCOPY with biopsy;  Surgeon: Gabe Ritchie MD;  Location: [facility] ENDOSCOPY;  Service: Gastroenterology;  Laterality: N/A;  hiatal hernia, retained food    ENDOSCOPY N/A 12/27/2024    Procedure: ESOPHAGOGASTRODUODENOSCOPY;  Surgeon: Gabe Ritchie MD;  Location: [facility] ENDOSCOPY;  Service: Gastroenterology;  Laterality: N/A;  gastric bx    FACIAL COSMETIC SURGERY      HYSTERECTOMY      SHOULDER SURGERY      spur removal    TONSILLECTOMY AND ADENOIDECTOMY      UPPER GASTROINTESTINAL ENDOSCOPY      12:2024    WISDOM TOOTH EXTRACTION         Problem List:  Patient Active Problem List   Diagnosis    Right knee pain    Patellar instability of right knee    Pulmonary infiltrate    Eosinophilic asthma    Pulmonary sarcoidosis    Pneumonia    Mycobacterium avium complex    Chest congestion    Severe persistent asthma    Gastroesophageal reflux disease    Family history of colon cancer in mother    Chronic idiopathic constipation    Encounter for screening for malignant neoplasm of colon       Allergy:   Allergies   Allergen Reactions    Reglan [Metoclopramide] Hives, Mental Status Change and Confusion    Naloxone Hives    Oxycodone-Acetaminophen Hives    Tetanus Toxoids Unknown - Low Severity     CHILDHOOD REACTION      Buprenorphine-Naloxone Hives    Morphine Headache    Pentazocine Hives    Tyloxapol Hives        Current Medications:   Current Outpatient Medications   Medication Sig Dispense Refill    buprenorphine (SUBUTEX) 8 MG sublingual tablet SL tablet Place 3 tablets under the tongue  Daily. 84 tablet 1    esomeprazole (nexIUM) 40 MG capsule Take 1 capsule by mouth 2 (Two) Times a Day. 60 capsule 5    albuterol sulfate  (90 Base) MCG/ACT inhaler INHALE TWO PUFFS BY MOUTH EVERY 4 HOURS 8.5 g 11    amitriptyline (ELAVIL) 100 MG tablet Take 1 tablet by mouth every night at bedtime.      Benralizumab (Fasenra Pen) 30 MG/ML solution auto-injector Inject 1 mL under the skin into the appropriate area as directed Every 2 (Two) Months. 1 mL 5    budesonide-formoterol (SYMBICORT) 160-4.5 MCG/ACT inhaler Inhale 2 puffs 2 (Two) Times a Day.      clonazePAM (KlonoPIN) 1 MG tablet Take 1 tablet by mouth 2 (Two) Times a Day.  0    fluticasone (Flonase) 50 MCG/ACT nasal spray 2 sprays into the nostril(s) as directed by provider Daily. 1 g 3    Forteo 600 MCG/2.4ML injection Inject  under the skin into the appropriate area as directed.      furosemide (LASIX) 20 MG tablet Take 1 tablet by mouth Daily.      GNP Calcium Citrate +D3 315-6.25 MG-MCG tablet tablet TAKE ONE TABLET BY MOUTH TWICE DAILY AS DIRECTED      ibuprofen (ADVIL,MOTRIN) 600 MG tablet TAKE ONE TABLET BY MOUTH EVERY 6 HOURS AS NEEDED FOR PAIN not relieved by tylenol      ipratropium-albuterol (DUO-NEB) 0.5-2.5 mg/3 ml nebulizer USE 1 VIAL IN NEBULIZER FOUR TIMES DAILY AS NEEDED FOR 10 DAYS      lamoTRIgine (LaMICtal) 200 MG tablet Take 1 tablet by mouth Daily. 90 tablet 1    levoFLOXacin (Levaquin) 750 MG tablet Take 1 tablet by mouth Daily. 7 tablet 0    levothyroxine (SYNTHROID, LEVOTHROID) 25 MCG tablet Take 1 tablet by mouth Every Morning.      Linzess 290 MCG capsule capsule Take 1 capsule by mouth Daily for 180 days. 90 capsule 1    metoclopramide (REGLAN) 10 MG tablet Take 1 tablet (10 mg total) by mouth 3 (three) times a day.      ondansetron ODT (ZOFRAN-ODT) 8 MG disintegrating tablet Place 1 tablet on the tongue Every 12 (Twelve) Hours As Needed for Nausea or Vomiting.      polyethylene glycol (MIRALAX) 17 GM/SCOOP powder        polyethylene glycol-electrolytes (NULYTELY) 420 g solution Take per office instructions      potassium chloride 10 MEQ CR tablet Take 1 tablet by mouth Daily.      predniSONE (DELTASONE) 20 MG tablet Take 1 tablet by mouth Daily. 7 tablet 0    promethazine (PHENERGAN) 25 MG tablet Take 1 tablet (25 mg total) by mouth every 6 (six) hours if needed for nausea or vomiting.      promethazine-dextromethorphan (PROMETHAZINE-DM) 6.25-15 MG/5ML syrup       Rimegepant Sulfate (Nurtec) 75 MG tablet dispersible tablet Take 1 tablet by mouth As Needed (Migraine).      tiZANidine (ZANAFLEX) 4 MG tablet Take 2 tablets by mouth Every 8 (Eight) Hours As Needed for Muscle Spasms.  5     Current Facility-Administered Medications   Medication Dose Route Frequency Provider Last Rate Last Admin    lactulose (CHRONULAC) 10 GM/15ML solution 20 g  20 g Oral Daily Everett Tapia MD           Past Medical History:  Past Medical History:   Diagnosis Date    Anxiety and depression     Asthma     Disease of thyroid gland     Family history of colon cancer in mother 10/08/2024    Gastroparesis     H/O: substance abuse     opiates    Melanoma 1992    Migraine     Neuropathy     Osteoporosis     Sarcoidosis     Seasonal allergies        Social History:  Social History     Socioeconomic History    Marital status:     Number of children: 2    Highest education level: Associate degree: academic program   Tobacco Use    Smoking status: Never     Passive exposure: Never    Smokeless tobacco: Never   Vaping Use    Vaping status: Never Used   Substance and Sexual Activity    Alcohol use: Not Currently    Drug use: Yes     Types: Hydrocodone, Hydromorphone, Oxycodone     Comment: HX OPIATE ABUSE- 15 YRS AGO    Sexual activity: Yes     Partners: Male     Birth control/protection: None, Hysterectomy       Social History     Social History Narrative    Social History:    Social Support: VJ    Residence: living setting HOME with VJ     Current Employment: [employer]    Education: [employer]    Learning Disabilities: NONE    Legal history: NONE    Hobbies/interests: NOTHING    Jew: Yarsani    Exercise: SOME    Dietary issues: NONE    Sleep issues: NONE IS ON MEDICATION    Caffeine intake: 2 PEPSI A DAY     history: NONE          Family History:  Family History   Problem Relation Age of Onset    Colon cancer Mother 50    Cancer Mother     Cancer Maternal Aunt     Heart disease Maternal Aunt     Malig Hyperthermia Neg Hx          Subjective      Review of Systems:   Review of Systems    PHQ-9 Score:       TIARRA-7 Score:        Patient History:   The following portions of the patient's history were reviewed and updated as appropriate: allergies, current medications, past family history, past medical history, past social history, past surgical history and problem list.     Social:  Social History     Socioeconomic History    Marital status:     Number of children: 2    Highest education level: Associate degree: academic program   Tobacco Use    Smoking status: Never     Passive exposure: Never    Smokeless tobacco: Never   Vaping Use    Vaping status: Never Used   Substance and Sexual Activity    Alcohol use: Not Currently    Drug use: Yes     Types: Hydrocodone, Hydromorphone, Oxycodone     Comment: HX OPIATE ABUSE- 15 YRS AGO    Sexual activity: Yes     Partners: Male     Birth control/protection: None, Hysterectomy       Medications:     Current Outpatient Medications:     buprenorphine (SUBUTEX) 8 MG sublingual tablet SL tablet, Place 3 tablets under the tongue Daily., Disp: 84 tablet, Rfl: 1    esomeprazole (nexIUM) 40 MG capsule, Take 1 capsule by mouth 2 (Two) Times a Day., Disp: 60 capsule, Rfl: 5    albuterol sulfate  (90 Base) MCG/ACT inhaler, INHALE TWO PUFFS BY MOUTH EVERY 4 HOURS, Disp: 8.5 g, Rfl: 11    amitriptyline (ELAVIL) 100 MG tablet, Take 1 tablet by mouth every night at bedtime., Disp: ,  Rfl:     Benralizumab (Fasenra Pen) 30 MG/ML solution auto-injector, Inject 1 mL under the skin into the appropriate area as directed Every 2 (Two) Months., Disp: 1 mL, Rfl: 5    budesonide-formoterol (SYMBICORT) 160-4.5 MCG/ACT inhaler, Inhale 2 puffs 2 (Two) Times a Day., Disp: , Rfl:     clonazePAM (KlonoPIN) 1 MG tablet, Take 1 tablet by mouth 2 (Two) Times a Day., Disp: , Rfl: 0    fluticasone (Flonase) 50 MCG/ACT nasal spray, 2 sprays into the nostril(s) as directed by provider Daily., Disp: 1 g, Rfl: 3    Forteo 600 MCG/2.4ML injection, Inject  under the skin into the appropriate area as directed., Disp: , Rfl:     furosemide (LASIX) 20 MG tablet, Take 1 tablet by mouth Daily., Disp: , Rfl:     GNP Calcium Citrate +D3 315-6.25 MG-MCG tablet tablet, TAKE ONE TABLET BY MOUTH TWICE DAILY AS DIRECTED, Disp: , Rfl:     ibuprofen (ADVIL,MOTRIN) 600 MG tablet, TAKE ONE TABLET BY MOUTH EVERY 6 HOURS AS NEEDED FOR PAIN not relieved by tylenol, Disp: , Rfl:     ipratropium-albuterol (DUO-NEB) 0.5-2.5 mg/3 ml nebulizer, USE 1 VIAL IN NEBULIZER FOUR TIMES DAILY AS NEEDED FOR 10 DAYS, Disp: , Rfl:     lamoTRIgine (LaMICtal) 200 MG tablet, Take 1 tablet by mouth Daily., Disp: 90 tablet, Rfl: 1    levoFLOXacin (Levaquin) 750 MG tablet, Take 1 tablet by mouth Daily., Disp: 7 tablet, Rfl: 0    levothyroxine (SYNTHROID, LEVOTHROID) 25 MCG tablet, Take 1 tablet by mouth Every Morning., Disp: , Rfl:     Linzess 290 MCG capsule capsule, Take 1 capsule by mouth Daily for 180 days., Disp: 90 capsule, Rfl: 1    metoclopramide (REGLAN) 10 MG tablet, Take 1 tablet (10 mg total) by mouth 3 (three) times a day., Disp: , Rfl:     ondansetron ODT (ZOFRAN-ODT) 8 MG disintegrating tablet, Place 1 tablet on the tongue Every 12 (Twelve) Hours As Needed for Nausea or Vomiting., Disp: , Rfl:     polyethylene glycol (MIRALAX) 17 GM/SCOOP powder, , Disp: , Rfl:     polyethylene glycol-electrolytes (NULYTELY) 420 g solution, Take per office  instructions, Disp: , Rfl:     potassium chloride 10 MEQ CR tablet, Take 1 tablet by mouth Daily., Disp: , Rfl:     predniSONE (DELTASONE) 20 MG tablet, Take 1 tablet by mouth Daily., Disp: 7 tablet, Rfl: 0    promethazine (PHENERGAN) 25 MG tablet, Take 1 tablet (25 mg total) by mouth every 6 (six) hours if needed for nausea or vomiting., Disp: , Rfl:     promethazine-dextromethorphan (PROMETHAZINE-DM) 6.25-15 MG/5ML syrup, , Disp: , Rfl:     Rimegepant Sulfate (Nurtec) 75 MG tablet dispersible tablet, Take 1 tablet by mouth As Needed (Migraine)., Disp: , Rfl:     tiZANidine (ZANAFLEX) 4 MG tablet, Take 2 tablets by mouth Every 8 (Eight) Hours As Needed for Muscle Spasms., Disp: , Rfl: 5    Current Facility-Administered Medications:     lactulose (CHRONULAC) 10 GM/15ML solution 20 g, 20 g, Oral, Daily, Everett Tapia MD    Objective     Physical Exam:  Physical Exam    Vital Signs:   There were no vitals filed for this visit.         There is no height or weight on file to calculate BMI.     MENTAL STATUS EXAM   General Appearance:  Cleanly groomed and dressed  Eye Contact:  Good eye contact  Attitude:  Cooperative  Motor Activity:  Normal gait, posture  Muscle Strength:  Normal  Speech:  Normal rate, tone, volume  Language:  Spontaneous  Mood and affect:  Normal, pleasant  Hopelessness:  Denies  Loneliness: Denies  Thought Process:  Logical  Associations/ Thought Content:  No delusions  Hallucinations:  None  Suicidal Ideations:  Not present  Homicidal Ideation:  Not present          Assessment / Plan      Diagnoses and all orders for this visit:    1. Opioid dependence in remission (Primary)  -     buprenorphine (SUBUTEX) 8 MG sublingual tablet SL tablet; Place 3 tablets under the tongue Daily.  Dispense: 84 tablet; Refill: 1    2. Post traumatic stress disorder (PTSD)    3. Gastroparesis    4. Gastroesophageal reflux disease with esophagitis without hemorrhage  -     esomeprazole (nexIUM) 40 MG capsule; Take 1  capsule by mouth 2 (Two) Times a Day.  Dispense: 60 capsule; Refill: 5           PLAN:  Safety: No acute safety concerns  Risk Assessment: Risk of self-harm acutely is low. Risk of self-harm chronically is also low, but could be further elevated in the event of treatment noncompliance and/or AODA.    TREATMENT PLAN/GOALS: Continue supportive psychotherapy efforts and medications as indicated. Treatment and medication options discussed during today's visit. Patient acknowledged and verbally consented to continue with current treatment plan and was educated on the importance of compliance with treatment and follow-up appointments.    MEDICATION ISSUES:  LEWIS reviewed as expected.  Discussed medication options and treatment plan of prescribed medication as well as the risks, benefits, and side effects including potential falls, possible impaired driving and metabolic adversities among others. Patient is agreeable to call the office with any worsening of symptoms or onset of side effects. Patient is agreeable to call 911 or go to the nearest ER should he/she begin having SI/HI. No medication side effects or related complaints today.     Return in about 2 months (around 9/30/2025).             This document has been electronically signed by Everett Tapia MD  July 31, 2025 08:22 EDT      Part of this note may be an electronic transcription/translation of spoken language to printed text using the Dragon Dictation System.

## 2025-08-05 ENCOUNTER — TELEPHONE (OUTPATIENT)
Age: 55
End: 2025-08-05
Payer: COMMERCIAL

## 2025-08-06 DIAGNOSIS — J82.83 EOSINOPHILIC ASTHMA: ICD-10-CM

## 2025-08-06 DIAGNOSIS — D86.0 PULMONARY SARCOIDOSIS: ICD-10-CM

## 2025-08-06 DIAGNOSIS — J45.50 SEVERE PERSISTENT ASTHMA, UNSPECIFIED WHETHER COMPLICATED: ICD-10-CM

## 2025-08-06 RX ORDER — BENRALIZUMAB 30 MG/ML
INJECTION, SOLUTION SUBCUTANEOUS
Qty: 1 ML | Refills: 5 | Status: SHIPPED | OUTPATIENT
Start: 2025-08-06

## (undated) DEVICE — BLCK/BITE BLOX WO/DENTL/RIM W/STRAP 54F

## (undated) DEVICE — SNAR E/S POLYP SNAREMASTER OVL/10MM 2.8X2300MM YEL

## (undated) DEVICE — PATIENT RETURN ELECTRODE, SINGLE USE, CONTACT QUALITY MONITORING, ADULT WITH 9 FT (2.7 M) CORD. FOR PATIENTS WEIGHING OVER 33LBS. (15KG): Brand: MEGADYNE

## (undated) DEVICE — SOLIDIFIER LIQLOC PLS 1500CC BT

## (undated) DEVICE — TRAP,MUCUS SPECIMEN,40CC: Brand: MEDLINE

## (undated) DEVICE — Device

## (undated) DEVICE — CUP SPECI 4OZ LF STRL

## (undated) DEVICE — CONN JET HYDRA H20 AUXILIARY DISP

## (undated) DEVICE — LINER SURG CANSTR SXN S/RIGD 1500CC

## (undated) DEVICE — Device: Brand: SINGLE USE INJECTOR NM600/610

## (undated) DEVICE — SINGLE USE SUCTION VALVE MAJ-209: Brand: SINGLE USE SUCTION VALVE (STERILE)

## (undated) DEVICE — INJ SUB/MUCUS ELEVIEW FOR/GI/ENDO/PROC AMPL/10ML

## (undated) DEVICE — SOL IRRG H2O PL/BG 1000ML STRL

## (undated) DEVICE — DISPOSABLE BIOPSY FORCEPS: Brand: DISPOSABLE BIOPSY FORCEPS

## (undated) DEVICE — SYR LUER SLPTP 50ML

## (undated) DEVICE — SINGLE USE BIOPSY VALVE MAJ-210: Brand: SINGLE USE BIOPSY VALVE (STERILE)

## (undated) DEVICE — Device: Brand: DEFENDO AIR/WATER/SUCTION AND BIOPSY VALVE

## (undated) DEVICE — SINGLE-USE BIOPSY FORCEPS: Brand: RADIAL JAW 4

## (undated) DEVICE — THE STERILE LIGHT HANDLE COVER IS USED WITH STERIS SURGICAL LIGHTING AND VISUALIZATION SYSTEMS.

## (undated) DEVICE — THE SINGLE USE ETRAP – POLYP TRAP IS USED FOR SUCTION RETRIEVAL OF ENDOSCOPICALLY REMOVED POLYPS.: Brand: ETRAP

## (undated) DEVICE — DEV ATOMIZATION MUCOSAL/NASALTRACH

## (undated) DEVICE — CONTAINER,SPEC,PNEUM TUBE,4OZ,STRL PATH: Brand: MEDLINE

## (undated) DEVICE — DISPOSABLE CYTOLOGY BRUSH: Brand: DISPOSABLE CYTOLOGY BRUSH